# Patient Record
Sex: MALE | Race: WHITE | NOT HISPANIC OR LATINO | Employment: OTHER
[De-identification: names, ages, dates, MRNs, and addresses within clinical notes are randomized per-mention and may not be internally consistent; named-entity substitution may affect disease eponyms.]

---

## 2017-02-22 ENCOUNTER — RECORDS - HEALTHEAST (OUTPATIENT)
Dept: ADMINISTRATIVE | Facility: OTHER | Age: 62
End: 2017-02-22

## 2017-03-21 ENCOUNTER — OFFICE VISIT - HEALTHEAST (OUTPATIENT)
Dept: FAMILY MEDICINE | Facility: CLINIC | Age: 62
End: 2017-03-21

## 2017-03-21 DIAGNOSIS — R06.2 WHEEZING: ICD-10-CM

## 2017-03-21 DIAGNOSIS — J11.1 INFLUENZA-LIKE ILLNESS: ICD-10-CM

## 2017-04-04 ENCOUNTER — RECORDS - HEALTHEAST (OUTPATIENT)
Dept: ADMINISTRATIVE | Facility: OTHER | Age: 62
End: 2017-04-04

## 2017-05-02 ENCOUNTER — RECORDS - HEALTHEAST (OUTPATIENT)
Dept: ADMINISTRATIVE | Facility: OTHER | Age: 62
End: 2017-05-02

## 2017-05-04 ENCOUNTER — COMMUNICATION - HEALTHEAST (OUTPATIENT)
Dept: SCHEDULING | Facility: CLINIC | Age: 62
End: 2017-05-04

## 2017-05-07 ENCOUNTER — COMMUNICATION - HEALTHEAST (OUTPATIENT)
Dept: FAMILY MEDICINE | Facility: CLINIC | Age: 62
End: 2017-05-07

## 2017-05-09 ENCOUNTER — AMBULATORY - HEALTHEAST (OUTPATIENT)
Dept: LAB | Facility: CLINIC | Age: 62
End: 2017-05-09

## 2017-05-09 ENCOUNTER — COMMUNICATION - HEALTHEAST (OUTPATIENT)
Dept: FAMILY MEDICINE | Facility: CLINIC | Age: 62
End: 2017-05-09

## 2017-05-09 DIAGNOSIS — J18.9 CAP (COMMUNITY ACQUIRED PNEUMONIA): ICD-10-CM

## 2017-05-11 ENCOUNTER — OFFICE VISIT - HEALTHEAST (OUTPATIENT)
Dept: FAMILY MEDICINE | Facility: CLINIC | Age: 62
End: 2017-05-11

## 2017-05-11 ENCOUNTER — HOSPITAL ENCOUNTER (OUTPATIENT)
Dept: ULTRASOUND IMAGING | Facility: CLINIC | Age: 62
Discharge: HOME | End: 2017-05-11
Attending: FAMILY MEDICINE

## 2017-05-11 ENCOUNTER — COMMUNICATION - HEALTHEAST (OUTPATIENT)
Dept: FAMILY MEDICINE | Facility: CLINIC | Age: 62
End: 2017-05-11

## 2017-05-11 DIAGNOSIS — R78.81 STREPTOCOCCAL BACTEREMIA: ICD-10-CM

## 2017-05-11 DIAGNOSIS — R60.9 EDEMA: ICD-10-CM

## 2017-05-11 DIAGNOSIS — B95.5 STREPTOCOCCAL BACTEREMIA: ICD-10-CM

## 2017-05-11 DIAGNOSIS — J18.9 CAP (COMMUNITY ACQUIRED PNEUMONIA): ICD-10-CM

## 2017-05-12 ENCOUNTER — AMBULATORY - HEALTHEAST (OUTPATIENT)
Dept: FAMILY MEDICINE | Facility: CLINIC | Age: 62
End: 2017-05-12

## 2017-05-12 ENCOUNTER — COMMUNICATION - HEALTHEAST (OUTPATIENT)
Dept: FAMILY MEDICINE | Facility: CLINIC | Age: 62
End: 2017-05-12

## 2017-05-12 DIAGNOSIS — R60.9 EDEMA: ICD-10-CM

## 2017-05-15 ENCOUNTER — RECORDS - HEALTHEAST (OUTPATIENT)
Dept: ADMINISTRATIVE | Facility: OTHER | Age: 62
End: 2017-05-15

## 2017-05-19 ENCOUNTER — RECORDS - HEALTHEAST (OUTPATIENT)
Dept: ADMINISTRATIVE | Facility: OTHER | Age: 62
End: 2017-05-19

## 2017-05-26 ENCOUNTER — OFFICE VISIT - HEALTHEAST (OUTPATIENT)
Dept: FAMILY MEDICINE | Facility: CLINIC | Age: 62
End: 2017-05-26

## 2017-05-26 DIAGNOSIS — R05.9 COUGH: ICD-10-CM

## 2017-05-26 DIAGNOSIS — J02.9 SORE THROAT: ICD-10-CM

## 2017-05-30 ENCOUNTER — RECORDS - HEALTHEAST (OUTPATIENT)
Dept: ADMINISTRATIVE | Facility: OTHER | Age: 62
End: 2017-05-30

## 2017-05-31 ENCOUNTER — HOSPITAL ENCOUNTER (OUTPATIENT)
Dept: CARDIOLOGY | Facility: CLINIC | Age: 62
Discharge: HOME | End: 2017-05-31
Attending: FAMILY MEDICINE

## 2017-05-31 DIAGNOSIS — R60.9 EDEMA: ICD-10-CM

## 2017-05-31 LAB
AORTIC ROOT: 3.1 CM
BSA FOR ECHO PROCEDURE: 1.99 M2
CV BLOOD PRESSURE: NORMAL MMHG
CV ECHO HEIGHT: 74 IN
CV ECHO WEIGHT: 168 LBS
DOP CALC LVOT AREA: 4.15 CM2
DOP CALC LVOT DIAMETER: 2.3 CM
DOP CALC LVOT PEAK VEL: 90.2 CM/S
DOP CALC LVOT STROKE VOLUME: 73.9 CM3
DOP CALCLVOT PEAK VEL VTI: 17.8 CM
EJECTION FRACTION: 55 % (ref 55–75)
FRACTIONAL SHORTENING: 28.4 % (ref 28–44)
INTERVENTRICULAR SEPTUM IN END DIASTOLE: 0.96 CM (ref 0.6–1)
IVS/PW RATIO: 1
LA AREA 1: 13.8 CM2
LA AREA 2: 9.3 CM2
LEFT ATRIUM LENGTH: 3.64 CM
LEFT ATRIUM SIZE: 2.6 CM
LEFT ATRIUM TO AORTIC ROOT RATIO: 0.84 NO UNITS
LEFT ATRIUM VOLUME INDEX: 15.1 ML/M2
LEFT ATRIUM VOLUME: 30 CM3
LEFT VENTRICLE DIASTOLIC VOLUME INDEX: 45.7 CM3/M2 (ref 34–74)
LEFT VENTRICLE DIASTOLIC VOLUME: 91 CM3 (ref 62–150)
LEFT VENTRICLE MASS INDEX: 81.7 G/M2
LEFT VENTRICLE SYSTOLIC VOLUME INDEX: 20.6 CM3/M2 (ref 11–31)
LEFT VENTRICLE SYSTOLIC VOLUME: 41 CM3 (ref 21–61)
LEFT VENTRICULAR INTERNAL DIMENSION IN DIASTOLE: 4.9 CM (ref 4.2–5.8)
LEFT VENTRICULAR INTERNAL DIMENSION IN SYSTOLE: 3.51 CM (ref 2.5–4)
LEFT VENTRICULAR MASS: 162.6 G
LEFT VENTRICULAR OUTFLOW TRACT MEAN GRADIENT: 2 MMHG
LEFT VENTRICULAR OUTFLOW TRACT MEAN VELOCITY: 61.7 CM/S
LEFT VENTRICULAR OUTFLOW TRACT PEAK GRADIENT: 3 MMHG
LEFT VENTRICULAR POSTERIOR WALL IN END DIASTOLE: 0.93 CM (ref 0.6–1)
LV STROKE VOLUME INDEX: 37.1 ML/M2
MITRAL VALVE E/A RATIO: 0.9
MV DECELERATION TIME: 225 MS
MV E'TISSUE VEL-LAT: 8.68 CM/S
MV LATERAL E/E' RATIO: 6.9
MV PEAK A VELOCITY: 67.6 CM/S
MV PEAK E VELOCITY: 60.2 CM/S
NUC REST DIASTOLIC VOLUME INDEX: 2688 LBS
NUC REST SYSTOLIC VOLUME INDEX: 74 IN
TRICUSPID REGURGITATION PEAK PRESSURE GRADIENT: 12.3 MMHG
TRICUSPID VALVE ANULAR PLANE SYSTOLIC EXCURSION: 2.3 CM
TRICUSPID VALVE PEAK REGURGITANT VELOCITY: 175 CM/S

## 2017-05-31 ASSESSMENT — MIFFLIN-ST. JEOR: SCORE: 1616.79

## 2017-06-06 ENCOUNTER — COMMUNICATION - HEALTHEAST (OUTPATIENT)
Dept: FAMILY MEDICINE | Facility: CLINIC | Age: 62
End: 2017-06-06

## 2017-06-06 ENCOUNTER — AMBULATORY - HEALTHEAST (OUTPATIENT)
Dept: FAMILY MEDICINE | Facility: CLINIC | Age: 62
End: 2017-06-06

## 2017-06-06 DIAGNOSIS — J18.9 CAP (COMMUNITY ACQUIRED PNEUMONIA): ICD-10-CM

## 2017-06-23 ENCOUNTER — RECORDS - HEALTHEAST (OUTPATIENT)
Dept: GENERAL RADIOLOGY | Facility: CLINIC | Age: 62
End: 2017-06-23

## 2017-06-23 DIAGNOSIS — J18.9 PNEUMONIA, UNSPECIFIED ORGANISM: ICD-10-CM

## 2017-06-28 ENCOUNTER — RECORDS - HEALTHEAST (OUTPATIENT)
Dept: ADMINISTRATIVE | Facility: OTHER | Age: 62
End: 2017-06-28

## 2017-07-11 ENCOUNTER — RECORDS - HEALTHEAST (OUTPATIENT)
Dept: ADMINISTRATIVE | Facility: OTHER | Age: 62
End: 2017-07-11

## 2017-08-09 ENCOUNTER — RECORDS - HEALTHEAST (OUTPATIENT)
Dept: ADMINISTRATIVE | Facility: OTHER | Age: 62
End: 2017-08-09

## 2017-08-30 ENCOUNTER — RECORDS - HEALTHEAST (OUTPATIENT)
Dept: ADMINISTRATIVE | Facility: OTHER | Age: 62
End: 2017-08-30

## 2017-11-01 ENCOUNTER — RECORDS - HEALTHEAST (OUTPATIENT)
Dept: ADMINISTRATIVE | Facility: OTHER | Age: 62
End: 2017-11-01

## 2017-11-03 ENCOUNTER — OFFICE VISIT - HEALTHEAST (OUTPATIENT)
Dept: FAMILY MEDICINE | Facility: CLINIC | Age: 62
End: 2017-11-03

## 2017-11-03 ENCOUNTER — COMMUNICATION - HEALTHEAST (OUTPATIENT)
Dept: TELEHEALTH | Facility: CLINIC | Age: 62
End: 2017-11-03

## 2017-11-03 DIAGNOSIS — H91.90 HEARING LOSS: ICD-10-CM

## 2017-11-03 DIAGNOSIS — Z87.01 H/O: PNEUMONIA: ICD-10-CM

## 2017-11-03 DIAGNOSIS — H93.19 TINNITUS: ICD-10-CM

## 2017-11-28 ENCOUNTER — RECORDS - HEALTHEAST (OUTPATIENT)
Dept: ADMINISTRATIVE | Facility: OTHER | Age: 62
End: 2017-11-28

## 2018-03-26 ENCOUNTER — RECORDS - HEALTHEAST (OUTPATIENT)
Dept: ADMINISTRATIVE | Facility: OTHER | Age: 63
End: 2018-03-26

## 2018-04-24 ENCOUNTER — RECORDS - HEALTHEAST (OUTPATIENT)
Dept: ADMINISTRATIVE | Facility: OTHER | Age: 63
End: 2018-04-24

## 2018-04-26 ENCOUNTER — OFFICE VISIT - HEALTHEAST (OUTPATIENT)
Dept: FAMILY MEDICINE | Facility: CLINIC | Age: 63
End: 2018-04-26

## 2018-04-26 ENCOUNTER — COMMUNICATION - HEALTHEAST (OUTPATIENT)
Dept: TELEHEALTH | Facility: CLINIC | Age: 63
End: 2018-04-26

## 2018-04-26 ENCOUNTER — COMMUNICATION - HEALTHEAST (OUTPATIENT)
Dept: FAMILY MEDICINE | Facility: CLINIC | Age: 63
End: 2018-04-26

## 2018-04-26 DIAGNOSIS — R68.89 COLD FEELING: ICD-10-CM

## 2018-04-26 DIAGNOSIS — R05.9 COUGH: ICD-10-CM

## 2018-04-26 LAB — TSH SERPL DL<=0.005 MIU/L-ACNC: 1.62 UIU/ML (ref 0.3–5)

## 2018-04-29 ENCOUNTER — OFFICE VISIT - HEALTHEAST (OUTPATIENT)
Dept: FAMILY MEDICINE | Facility: CLINIC | Age: 63
End: 2018-04-29

## 2018-04-29 DIAGNOSIS — R05.9 COUGH: ICD-10-CM

## 2018-04-29 DIAGNOSIS — J18.9 CAP (COMMUNITY ACQUIRED PNEUMONIA): ICD-10-CM

## 2018-06-01 ENCOUNTER — RECORDS - HEALTHEAST (OUTPATIENT)
Dept: ADMINISTRATIVE | Facility: OTHER | Age: 63
End: 2018-06-01

## 2018-06-18 ENCOUNTER — RECORDS - HEALTHEAST (OUTPATIENT)
Dept: ADMINISTRATIVE | Facility: OTHER | Age: 63
End: 2018-06-18

## 2018-07-31 ENCOUNTER — RECORDS - HEALTHEAST (OUTPATIENT)
Dept: ADMINISTRATIVE | Facility: OTHER | Age: 63
End: 2018-07-31

## 2018-08-06 ENCOUNTER — RECORDS - HEALTHEAST (OUTPATIENT)
Dept: ADMINISTRATIVE | Facility: OTHER | Age: 63
End: 2018-08-06

## 2018-09-12 ENCOUNTER — RECORDS - HEALTHEAST (OUTPATIENT)
Dept: ADMINISTRATIVE | Facility: OTHER | Age: 63
End: 2018-09-12

## 2018-10-02 ENCOUNTER — RECORDS - HEALTHEAST (OUTPATIENT)
Dept: ADMINISTRATIVE | Facility: OTHER | Age: 63
End: 2018-10-02

## 2018-10-05 ENCOUNTER — COMMUNICATION - HEALTHEAST (OUTPATIENT)
Dept: FAMILY MEDICINE | Facility: CLINIC | Age: 63
End: 2018-10-05

## 2018-10-20 ENCOUNTER — RECORDS - HEALTHEAST (OUTPATIENT)
Dept: ADMINISTRATIVE | Facility: OTHER | Age: 63
End: 2018-10-20

## 2018-10-24 ENCOUNTER — RECORDS - HEALTHEAST (OUTPATIENT)
Dept: ADMINISTRATIVE | Facility: OTHER | Age: 63
End: 2018-10-24

## 2018-11-06 ENCOUNTER — OFFICE VISIT - HEALTHEAST (OUTPATIENT)
Dept: INTERNAL MEDICINE | Facility: CLINIC | Age: 63
End: 2018-11-06

## 2018-11-06 DIAGNOSIS — M19.041 PRIMARY OSTEOARTHRITIS OF BOTH HANDS: ICD-10-CM

## 2018-11-06 DIAGNOSIS — M19.042 PRIMARY OSTEOARTHRITIS OF BOTH HANDS: ICD-10-CM

## 2018-11-06 DIAGNOSIS — K50.812 CROHN'S DISEASE OF BOTH SMALL AND LARGE INTESTINE WITH INTESTINAL OBSTRUCTION (H): ICD-10-CM

## 2018-11-06 DIAGNOSIS — E53.8 VITAMIN B12 DEFICIENCY (NON ANEMIC): ICD-10-CM

## 2018-11-06 DIAGNOSIS — R73.9 HYPERGLYCEMIA: ICD-10-CM

## 2018-11-06 DIAGNOSIS — L30.9 DERMATITIS: ICD-10-CM

## 2018-11-06 DIAGNOSIS — Z12.5 SCREENING FOR PROSTATE CANCER: ICD-10-CM

## 2018-11-06 LAB
HBA1C MFR BLD: 5.1 % (ref 3.5–6)
PSA SERPL-MCNC: 0.3 NG/ML (ref 0–4.5)

## 2018-11-06 ASSESSMENT — MIFFLIN-ST. JEOR: SCORE: 1673.77

## 2018-11-07 ENCOUNTER — COMMUNICATION - HEALTHEAST (OUTPATIENT)
Dept: INTERNAL MEDICINE | Facility: CLINIC | Age: 63
End: 2018-11-07

## 2018-12-03 ENCOUNTER — RECORDS - HEALTHEAST (OUTPATIENT)
Dept: ADMINISTRATIVE | Facility: OTHER | Age: 63
End: 2018-12-03

## 2019-01-08 ENCOUNTER — RECORDS - HEALTHEAST (OUTPATIENT)
Dept: ADMINISTRATIVE | Facility: OTHER | Age: 64
End: 2019-01-08

## 2019-02-18 ENCOUNTER — RECORDS - HEALTHEAST (OUTPATIENT)
Dept: ADMINISTRATIVE | Facility: OTHER | Age: 64
End: 2019-02-18

## 2019-04-01 ENCOUNTER — RECORDS - HEALTHEAST (OUTPATIENT)
Dept: ADMINISTRATIVE | Facility: OTHER | Age: 64
End: 2019-04-01

## 2019-04-11 ENCOUNTER — OFFICE VISIT - HEALTHEAST (OUTPATIENT)
Dept: INTERNAL MEDICINE | Facility: CLINIC | Age: 64
End: 2019-04-11

## 2019-04-11 DIAGNOSIS — M25.552 HIP PAIN, BILATERAL: ICD-10-CM

## 2019-04-11 DIAGNOSIS — M54.50 ACUTE BILATERAL LOW BACK PAIN WITHOUT SCIATICA: ICD-10-CM

## 2019-04-11 DIAGNOSIS — K50.812 CROHN'S DISEASE OF BOTH SMALL AND LARGE INTESTINE WITH INTESTINAL OBSTRUCTION (H): ICD-10-CM

## 2019-04-11 DIAGNOSIS — M25.561 ACUTE PAIN OF BOTH KNEES: ICD-10-CM

## 2019-04-11 DIAGNOSIS — M25.551 HIP PAIN, BILATERAL: ICD-10-CM

## 2019-04-11 DIAGNOSIS — M25.562 ACUTE PAIN OF BOTH KNEES: ICD-10-CM

## 2019-04-30 ENCOUNTER — RECORDS - HEALTHEAST (OUTPATIENT)
Dept: ADMINISTRATIVE | Facility: OTHER | Age: 64
End: 2019-04-30

## 2019-05-13 ENCOUNTER — RECORDS - HEALTHEAST (OUTPATIENT)
Dept: ADMINISTRATIVE | Facility: OTHER | Age: 64
End: 2019-05-13

## 2019-06-24 ENCOUNTER — RECORDS - HEALTHEAST (OUTPATIENT)
Dept: ADMINISTRATIVE | Facility: OTHER | Age: 64
End: 2019-06-24

## 2019-07-10 ENCOUNTER — OFFICE VISIT - HEALTHEAST (OUTPATIENT)
Dept: INTERNAL MEDICINE | Facility: CLINIC | Age: 64
End: 2019-07-10

## 2019-07-10 DIAGNOSIS — M19.041 PRIMARY OSTEOARTHRITIS OF BOTH HANDS: ICD-10-CM

## 2019-07-10 DIAGNOSIS — Z82.49 FAMILY HISTORY OF CORONARY ARTERY DISEASE IN FATHER: ICD-10-CM

## 2019-07-10 DIAGNOSIS — M19.042 PRIMARY OSTEOARTHRITIS OF BOTH HANDS: ICD-10-CM

## 2019-07-10 DIAGNOSIS — Z00.00 HEALTHCARE MAINTENANCE: ICD-10-CM

## 2019-07-10 DIAGNOSIS — E53.8 VITAMIN B12 DEFICIENCY (NON ANEMIC): ICD-10-CM

## 2019-07-10 DIAGNOSIS — N52.9 ERECTILE DYSFUNCTION, UNSPECIFIED ERECTILE DYSFUNCTION TYPE: ICD-10-CM

## 2019-07-10 DIAGNOSIS — K50.812 CROHN'S DISEASE OF BOTH SMALL AND LARGE INTESTINE WITH INTESTINAL OBSTRUCTION (H): ICD-10-CM

## 2019-07-10 LAB
ALBUMIN SERPL-MCNC: 3.4 G/DL (ref 3.5–5)
ALP SERPL-CCNC: 72 U/L (ref 45–120)
ALT SERPL W P-5'-P-CCNC: 15 U/L (ref 0–45)
ANION GAP SERPL CALCULATED.3IONS-SCNC: 5 MMOL/L (ref 5–18)
AST SERPL W P-5'-P-CCNC: 22 U/L (ref 0–40)
BILIRUB SERPL-MCNC: 1.4 MG/DL (ref 0–1)
BUN SERPL-MCNC: 13 MG/DL (ref 8–22)
CALCIUM SERPL-MCNC: 9.5 MG/DL (ref 8.5–10.5)
CHLORIDE BLD-SCNC: 104 MMOL/L (ref 98–107)
CO2 SERPL-SCNC: 28 MMOL/L (ref 22–31)
CREAT SERPL-MCNC: 1.17 MG/DL (ref 0.7–1.3)
ERYTHROCYTE [DISTWIDTH] IN BLOOD BY AUTOMATED COUNT: 12.5 % (ref 11–14.5)
FASTING STATUS PATIENT QL REPORTED: NORMAL
GFR SERPL CREATININE-BSD FRML MDRD: >60 ML/MIN/1.73M2
GLUCOSE BLD-MCNC: 84 MG/DL (ref 70–125)
HCT VFR BLD AUTO: 40.1 % (ref 40–54)
HDLC SERPL-MCNC: 48 MG/DL
HGB BLD-MCNC: 13.2 G/DL (ref 14–18)
LDLC SERPL CALC-MCNC: 59 MG/DL
MCH RBC QN AUTO: 29.3 PG (ref 27–34)
MCHC RBC AUTO-ENTMCNC: 33 G/DL (ref 32–36)
MCV RBC AUTO: 89 FL (ref 80–100)
PLATELET # BLD AUTO: 248 THOU/UL (ref 140–440)
PMV BLD AUTO: 7.6 FL (ref 7–10)
POTASSIUM BLD-SCNC: 4.2 MMOL/L (ref 3.5–5)
PROT SERPL-MCNC: 7.1 G/DL (ref 6–8)
RBC # BLD AUTO: 4.51 MILL/UL (ref 4.4–6.2)
SODIUM SERPL-SCNC: 137 MMOL/L (ref 136–145)
VIT B12 SERPL-MCNC: 438 PG/ML (ref 213–816)
WBC: 11.2 THOU/UL (ref 4–11)

## 2019-07-10 ASSESSMENT — MIFFLIN-ST. JEOR: SCORE: 1673.78

## 2019-07-11 ENCOUNTER — COMMUNICATION - HEALTHEAST (OUTPATIENT)
Dept: INTERNAL MEDICINE | Facility: CLINIC | Age: 64
End: 2019-07-11

## 2019-07-30 ENCOUNTER — RECORDS - HEALTHEAST (OUTPATIENT)
Dept: ADMINISTRATIVE | Facility: OTHER | Age: 64
End: 2019-07-30

## 2019-08-05 ENCOUNTER — RECORDS - HEALTHEAST (OUTPATIENT)
Dept: ADMINISTRATIVE | Facility: OTHER | Age: 64
End: 2019-08-05

## 2019-09-16 ENCOUNTER — RECORDS - HEALTHEAST (OUTPATIENT)
Dept: ADMINISTRATIVE | Facility: OTHER | Age: 64
End: 2019-09-16

## 2019-09-18 ENCOUNTER — RECORDS - HEALTHEAST (OUTPATIENT)
Dept: ADMINISTRATIVE | Facility: OTHER | Age: 64
End: 2019-09-18

## 2019-10-23 ENCOUNTER — RECORDS - HEALTHEAST (OUTPATIENT)
Dept: ADMINISTRATIVE | Facility: OTHER | Age: 64
End: 2019-10-23

## 2019-12-16 ENCOUNTER — OFFICE VISIT - HEALTHEAST (OUTPATIENT)
Dept: FAMILY MEDICINE | Facility: CLINIC | Age: 64
End: 2019-12-16

## 2019-12-16 DIAGNOSIS — R42 DIZZINESS: ICD-10-CM

## 2019-12-16 DIAGNOSIS — R61 NIGHT SWEATS: ICD-10-CM

## 2019-12-16 DIAGNOSIS — R51.9 NONINTRACTABLE EPISODIC HEADACHE, UNSPECIFIED HEADACHE TYPE: ICD-10-CM

## 2019-12-16 DIAGNOSIS — I44.4 LAFB (LEFT ANTERIOR FASCICULAR BLOCK): ICD-10-CM

## 2019-12-16 LAB
ALBUMIN SERPL-MCNC: 3.6 G/DL (ref 3.5–5)
ALP SERPL-CCNC: 87 U/L (ref 45–120)
ALT SERPL W P-5'-P-CCNC: 12 U/L (ref 0–45)
ANION GAP SERPL CALCULATED.3IONS-SCNC: 12 MMOL/L (ref 5–18)
AST SERPL W P-5'-P-CCNC: 18 U/L (ref 0–40)
BILIRUB SERPL-MCNC: 1 MG/DL (ref 0–1)
BUN SERPL-MCNC: 14 MG/DL (ref 8–22)
CALCIUM SERPL-MCNC: 9.4 MG/DL (ref 8.5–10.5)
CHLORIDE BLD-SCNC: 104 MMOL/L (ref 98–107)
CO2 SERPL-SCNC: 24 MMOL/L (ref 22–31)
CREAT SERPL-MCNC: 1.24 MG/DL (ref 0.7–1.3)
ERYTHROCYTE [DISTWIDTH] IN BLOOD BY AUTOMATED COUNT: 12 % (ref 11–14.5)
GFR SERPL CREATININE-BSD FRML MDRD: 59 ML/MIN/1.73M2
GLUCOSE BLD-MCNC: 80 MG/DL (ref 70–125)
HCT VFR BLD AUTO: 46.3 % (ref 40–54)
HGB BLD-MCNC: 14.8 G/DL (ref 14–18)
MCH RBC QN AUTO: 29.3 PG (ref 27–34)
MCHC RBC AUTO-ENTMCNC: 32 G/DL (ref 32–36)
MCV RBC AUTO: 92 FL (ref 80–100)
PLATELET # BLD AUTO: 292 THOU/UL (ref 140–440)
PMV BLD AUTO: 7.6 FL (ref 7–10)
POTASSIUM BLD-SCNC: 4.2 MMOL/L (ref 3.5–5)
PROT SERPL-MCNC: 7.9 G/DL (ref 6–8)
RBC # BLD AUTO: 5.04 MILL/UL (ref 4.4–6.2)
SODIUM SERPL-SCNC: 140 MMOL/L (ref 136–145)
TSH SERPL DL<=0.005 MIU/L-ACNC: 3.5 UIU/ML (ref 0.3–5)
VIT B12 SERPL-MCNC: 620 PG/ML (ref 213–816)
WBC: 16.3 THOU/UL (ref 4–11)

## 2019-12-17 LAB
ATRIAL RATE - MUSE: 82 BPM
DIASTOLIC BLOOD PRESSURE - MUSE: NORMAL
INTERPRETATION ECG - MUSE: NORMAL
P AXIS - MUSE: 70 DEGREES
PR INTERVAL - MUSE: 178 MS
QRS DURATION - MUSE: 100 MS
QT - MUSE: 386 MS
QTC - MUSE: 450 MS
R AXIS - MUSE: -55 DEGREES
SYSTOLIC BLOOD PRESSURE - MUSE: NORMAL
T AXIS - MUSE: 55 DEGREES
VENTRICULAR RATE- MUSE: 82 BPM

## 2020-01-08 ENCOUNTER — RECORDS - HEALTHEAST (OUTPATIENT)
Dept: ADMINISTRATIVE | Facility: OTHER | Age: 65
End: 2020-01-08

## 2020-02-14 ENCOUNTER — RECORDS - HEALTHEAST (OUTPATIENT)
Dept: ADMINISTRATIVE | Facility: OTHER | Age: 65
End: 2020-02-14

## 2020-03-25 ENCOUNTER — RECORDS - HEALTHEAST (OUTPATIENT)
Dept: ADMINISTRATIVE | Facility: OTHER | Age: 65
End: 2020-03-25

## 2020-04-24 ENCOUNTER — RECORDS - HEALTHEAST (OUTPATIENT)
Dept: ADMINISTRATIVE | Facility: OTHER | Age: 65
End: 2020-04-24

## 2020-05-08 ENCOUNTER — RECORDS - HEALTHEAST (OUTPATIENT)
Dept: ADMINISTRATIVE | Facility: OTHER | Age: 65
End: 2020-05-08

## 2020-06-09 ENCOUNTER — RECORDS - HEALTHEAST (OUTPATIENT)
Dept: ADMINISTRATIVE | Facility: OTHER | Age: 65
End: 2020-06-09

## 2020-06-10 ENCOUNTER — AMBULATORY - HEALTHEAST (OUTPATIENT)
Dept: SURGERY | Facility: AMBULATORY SURGERY CENTER | Age: 65
End: 2020-06-10

## 2020-06-10 ENCOUNTER — RECORDS - HEALTHEAST (OUTPATIENT)
Dept: ADMINISTRATIVE | Facility: OTHER | Age: 65
End: 2020-06-10

## 2020-06-10 DIAGNOSIS — Z11.59 ENCOUNTER FOR SCREENING FOR OTHER VIRAL DISEASES: ICD-10-CM

## 2020-06-11 ENCOUNTER — COMMUNICATION - HEALTHEAST (OUTPATIENT)
Dept: TELEHEALTH | Facility: CLINIC | Age: 65
End: 2020-06-11

## 2020-06-11 ENCOUNTER — OFFICE VISIT - HEALTHEAST (OUTPATIENT)
Dept: INTERNAL MEDICINE | Facility: CLINIC | Age: 65
End: 2020-06-11

## 2020-06-11 DIAGNOSIS — K50.812 CROHN'S DISEASE OF BOTH SMALL AND LARGE INTESTINE WITH INTESTINAL OBSTRUCTION (H): ICD-10-CM

## 2020-06-11 DIAGNOSIS — K61.0 PERIANAL ABSCESS: ICD-10-CM

## 2020-06-11 DIAGNOSIS — Z01.818 PREOP GENERAL PHYSICAL EXAM: ICD-10-CM

## 2020-06-11 DIAGNOSIS — M85.89 OSTEOPENIA OF MULTIPLE SITES: ICD-10-CM

## 2020-06-11 LAB
ANION GAP SERPL CALCULATED.3IONS-SCNC: 11 MMOL/L (ref 5–18)
BASOPHILS # BLD AUTO: 0.1 THOU/UL (ref 0–0.2)
BASOPHILS NFR BLD AUTO: 1 % (ref 0–2)
BUN SERPL-MCNC: 8 MG/DL (ref 8–22)
CALCIUM SERPL-MCNC: 8.7 MG/DL (ref 8.5–10.5)
CHLORIDE BLD-SCNC: 104 MMOL/L (ref 98–107)
CO2 SERPL-SCNC: 24 MMOL/L (ref 22–31)
CREAT SERPL-MCNC: 1.13 MG/DL (ref 0.7–1.3)
EOSINOPHIL # BLD AUTO: 0.3 THOU/UL (ref 0–0.4)
EOSINOPHIL NFR BLD AUTO: 3 % (ref 0–6)
ERYTHROCYTE [DISTWIDTH] IN BLOOD BY AUTOMATED COUNT: 11.8 % (ref 11–14.5)
GFR SERPL CREATININE-BSD FRML MDRD: >60 ML/MIN/1.73M2
GLUCOSE BLD-MCNC: 103 MG/DL (ref 70–125)
HCT VFR BLD AUTO: 41 % (ref 40–54)
HGB BLD-MCNC: 13.5 G/DL (ref 14–18)
LYMPHOCYTES # BLD AUTO: 3.5 THOU/UL (ref 0.8–4.4)
LYMPHOCYTES NFR BLD AUTO: 31 % (ref 20–40)
MCH RBC QN AUTO: 29.7 PG (ref 27–34)
MCHC RBC AUTO-ENTMCNC: 32.9 G/DL (ref 32–36)
MCV RBC AUTO: 90 FL (ref 80–100)
MONOCYTES # BLD AUTO: 0.9 THOU/UL (ref 0–0.9)
MONOCYTES NFR BLD AUTO: 8 % (ref 2–10)
NEUTROPHILS # BLD AUTO: 6.3 THOU/UL (ref 2–7.7)
NEUTROPHILS NFR BLD AUTO: 58 % (ref 50–70)
PLATELET # BLD AUTO: 341 THOU/UL (ref 140–440)
PMV BLD AUTO: 7.4 FL (ref 7–10)
POTASSIUM BLD-SCNC: 3.9 MMOL/L (ref 3.5–5)
RBC # BLD AUTO: 4.54 MILL/UL (ref 4.4–6.2)
SODIUM SERPL-SCNC: 139 MMOL/L (ref 136–145)
WBC: 11 THOU/UL (ref 4–11)

## 2020-06-11 ASSESSMENT — MIFFLIN-ST. JEOR
SCORE: 1712.05
SCORE: 1712.05

## 2020-06-12 ENCOUNTER — ANESTHESIA - HEALTHEAST (OUTPATIENT)
Dept: SURGERY | Facility: AMBULATORY SURGERY CENTER | Age: 65
End: 2020-06-12

## 2020-06-12 ENCOUNTER — AMBULATORY - HEALTHEAST (OUTPATIENT)
Dept: FAMILY MEDICINE | Facility: CLINIC | Age: 65
End: 2020-06-12

## 2020-06-12 DIAGNOSIS — Z11.59 ENCOUNTER FOR SCREENING FOR OTHER VIRAL DISEASES: ICD-10-CM

## 2020-06-12 LAB
ATRIAL RATE - MUSE: 76 BPM
DIASTOLIC BLOOD PRESSURE - MUSE: NORMAL
INTERPRETATION ECG - MUSE: NORMAL
P AXIS - MUSE: 71 DEGREES
PR INTERVAL - MUSE: 182 MS
QRS DURATION - MUSE: 100 MS
QT - MUSE: 404 MS
QTC - MUSE: 454 MS
R AXIS - MUSE: -50 DEGREES
SYSTOLIC BLOOD PRESSURE - MUSE: NORMAL
T AXIS - MUSE: 53 DEGREES
VENTRICULAR RATE- MUSE: 76 BPM

## 2020-06-15 ENCOUNTER — SURGERY - HEALTHEAST (OUTPATIENT)
Dept: SURGERY | Facility: AMBULATORY SURGERY CENTER | Age: 65
End: 2020-06-15

## 2020-06-15 ENCOUNTER — RECORDS - HEALTHEAST (OUTPATIENT)
Dept: LAB | Facility: HOSPITAL | Age: 65
End: 2020-06-15

## 2020-06-15 ENCOUNTER — HOSPITAL ENCOUNTER (OUTPATIENT)
Dept: SURGERY | Facility: AMBULATORY SURGERY CENTER | Age: 65
Discharge: HOME | End: 2020-06-15
Attending: COLON & RECTAL SURGERY | Admitting: COLON & RECTAL SURGERY

## 2020-06-15 LAB
HBV SURFACE AG SERPL QL IA: NEGATIVE
HIV 1+2 AB+HIV1 P24 AG SERPL QL IA: NEGATIVE
HIV 1,2 ANTIBODY: NEGATIVE

## 2020-06-15 ASSESSMENT — MIFFLIN-ST. JEOR
SCORE: 1712.05
SCORE: 1712.05

## 2020-06-16 LAB — HCV AB SERPL QL IA: NEGATIVE

## 2020-06-18 ENCOUNTER — COMMUNICATION - HEALTHEAST (OUTPATIENT)
Dept: SCHEDULING | Facility: CLINIC | Age: 65
End: 2020-06-18

## 2020-06-18 ENCOUNTER — COMMUNICATION - HEALTHEAST (OUTPATIENT)
Dept: INTERNAL MEDICINE | Facility: CLINIC | Age: 65
End: 2020-06-18

## 2020-06-18 ENCOUNTER — OFFICE VISIT - HEALTHEAST (OUTPATIENT)
Dept: INTERNAL MEDICINE | Facility: CLINIC | Age: 65
End: 2020-06-18

## 2020-06-18 DIAGNOSIS — R60.0 BILATERAL LEG EDEMA: ICD-10-CM

## 2020-06-18 DIAGNOSIS — M85.89 OSTEOPENIA OF MULTIPLE SITES: ICD-10-CM

## 2020-06-18 DIAGNOSIS — K50.812 CROHN'S DISEASE OF BOTH SMALL AND LARGE INTESTINE WITH INTESTINAL OBSTRUCTION (H): ICD-10-CM

## 2020-06-18 DIAGNOSIS — L02.31 ABSCESS OF BUTTOCK, RIGHT: ICD-10-CM

## 2020-06-18 LAB
ALBUMIN SERPL-MCNC: 3.2 G/DL (ref 3.5–5)
ALBUMIN UR-MCNC: NEGATIVE MG/DL
ALP SERPL-CCNC: 73 U/L (ref 45–120)
ALT SERPL W P-5'-P-CCNC: 44 U/L (ref 0–45)
ANION GAP SERPL CALCULATED.3IONS-SCNC: 9 MMOL/L (ref 5–18)
APPEARANCE UR: CLEAR
AST SERPL W P-5'-P-CCNC: 46 U/L (ref 0–40)
BASOPHILS # BLD AUTO: 0.1 THOU/UL (ref 0–0.2)
BASOPHILS NFR BLD AUTO: 1 % (ref 0–2)
BILIRUB SERPL-MCNC: 1.1 MG/DL (ref 0–1)
BILIRUB UR QL STRIP: NEGATIVE
BUN SERPL-MCNC: 12 MG/DL (ref 8–22)
CALCIUM SERPL-MCNC: 8.6 MG/DL (ref 8.5–10.5)
CHLORIDE BLD-SCNC: 105 MMOL/L (ref 98–107)
CO2 SERPL-SCNC: 26 MMOL/L (ref 22–31)
COLOR UR AUTO: YELLOW
CREAT SERPL-MCNC: 1.15 MG/DL (ref 0.7–1.3)
EOSINOPHIL # BLD AUTO: 0.4 THOU/UL (ref 0–0.4)
EOSINOPHIL NFR BLD AUTO: 3 % (ref 0–6)
ERYTHROCYTE [DISTWIDTH] IN BLOOD BY AUTOMATED COUNT: 12.7 % (ref 11–14.5)
GFR SERPL CREATININE-BSD FRML MDRD: >60 ML/MIN/1.73M2
GLUCOSE BLD-MCNC: 109 MG/DL (ref 70–125)
GLUCOSE UR STRIP-MCNC: NEGATIVE MG/DL
HCT VFR BLD AUTO: 39.6 % (ref 40–54)
HGB BLD-MCNC: 13.1 G/DL (ref 14–18)
HGB UR QL STRIP: NEGATIVE
KETONES UR STRIP-MCNC: NEGATIVE MG/DL
LEUKOCYTE ESTERASE UR QL STRIP: NEGATIVE
LYMPHOCYTES # BLD AUTO: 3 THOU/UL (ref 0.8–4.4)
LYMPHOCYTES NFR BLD AUTO: 26 % (ref 20–40)
MCH RBC QN AUTO: 30 PG (ref 27–34)
MCHC RBC AUTO-ENTMCNC: 33.2 G/DL (ref 32–36)
MCV RBC AUTO: 91 FL (ref 80–100)
MONOCYTES # BLD AUTO: 0.9 THOU/UL (ref 0–0.9)
MONOCYTES NFR BLD AUTO: 8 % (ref 2–10)
NEUTROPHILS # BLD AUTO: 7.2 THOU/UL (ref 2–7.7)
NEUTROPHILS NFR BLD AUTO: 62 % (ref 50–70)
NITRATE UR QL: NEGATIVE
PH UR STRIP: 6 [PH] (ref 5–8)
PLATELET # BLD AUTO: 304 THOU/UL (ref 140–440)
PMV BLD AUTO: 7.7 FL (ref 7–10)
POTASSIUM BLD-SCNC: 3.8 MMOL/L (ref 3.5–5)
PROT SERPL-MCNC: 7 G/DL (ref 6–8)
RBC # BLD AUTO: 4.37 MILL/UL (ref 4.4–6.2)
SODIUM SERPL-SCNC: 140 MMOL/L (ref 136–145)
SP GR UR STRIP: 1.01 (ref 1–1.03)
UROBILINOGEN UR STRIP-ACNC: NORMAL
WBC: 11.5 THOU/UL (ref 4–11)

## 2020-06-19 ENCOUNTER — RECORDS - HEALTHEAST (OUTPATIENT)
Dept: ADMINISTRATIVE | Facility: OTHER | Age: 65
End: 2020-06-19

## 2020-06-23 ENCOUNTER — COMMUNICATION - HEALTHEAST (OUTPATIENT)
Dept: INTERNAL MEDICINE | Facility: CLINIC | Age: 65
End: 2020-06-23

## 2020-06-23 ENCOUNTER — HOSPITAL ENCOUNTER (OUTPATIENT)
Dept: CARDIOLOGY | Facility: HOSPITAL | Age: 65
Discharge: HOME | End: 2020-06-23
Attending: INTERNAL MEDICINE

## 2020-06-23 DIAGNOSIS — R60.0 BILATERAL LEG EDEMA: ICD-10-CM

## 2020-06-23 DIAGNOSIS — K50.812 CROHN'S DISEASE OF BOTH SMALL AND LARGE INTESTINE WITH INTESTINAL OBSTRUCTION (H): ICD-10-CM

## 2020-06-23 LAB
AORTIC ROOT: 3.4 CM
BSA FOR ECHO PROCEDURE: 2.11 M2
CV BLOOD PRESSURE: NORMAL MMHG
CV ECHO HEIGHT: 74 IN
CV ECHO WEIGHT: 189 LBS
DOP CALC LVOT AREA: 3.8 CM2
DOP CALC LVOT DIAMETER: 2.2 CM
DOP CALC LVOT PEAK VEL: 89.4 CM/S
DOP CALC LVOT STROKE VOLUME: 66.5 CM3
DOP CALCLVOT PEAK VEL VTI: 17.5 CM
EJECTION FRACTION: 59 % (ref 55–75)
FRACTIONAL SHORTENING: 28.7 % (ref 28–44)
INTERVENTRICULAR SEPTUM IN END DIASTOLE: 0.87 CM (ref 0.6–1)
IVS/PW RATIO: 0.9
LA AREA 1: 13 CM2
LA AREA 2: 17 CM2
LEFT ATRIUM LENGTH: 4.2 CM
LEFT ATRIUM SIZE: 3 CM
LEFT ATRIUM TO AORTIC ROOT RATIO: 0.88 NO UNITS
LEFT ATRIUM VOLUME INDEX: 21.2 ML/M2
LEFT ATRIUM VOLUME: 44.7 ML
LEFT VENTRICLE CARDIAC INDEX: 2.7 L/MIN/M2
LEFT VENTRICLE CARDIAC OUTPUT: 5.7 L/MIN
LEFT VENTRICLE DIASTOLIC VOLUME INDEX: 54.5 CM3/M2 (ref 34–74)
LEFT VENTRICLE DIASTOLIC VOLUME: 115 CM3 (ref 62–150)
LEFT VENTRICLE HEART RATE: 86 BPM
LEFT VENTRICLE MASS INDEX: 73.8 G/M2
LEFT VENTRICLE SYSTOLIC VOLUME INDEX: 22.2 CM3/M2 (ref 11–31)
LEFT VENTRICLE SYSTOLIC VOLUME: 46.9 CM3 (ref 21–61)
LEFT VENTRICULAR INTERNAL DIMENSION IN DIASTOLE: 4.98 CM (ref 4.2–5.8)
LEFT VENTRICULAR INTERNAL DIMENSION IN SYSTOLE: 3.55 CM (ref 2.5–4)
LEFT VENTRICULAR MASS: 155.8 G
LEFT VENTRICULAR OUTFLOW TRACT MEAN GRADIENT: 2 MMHG
LEFT VENTRICULAR OUTFLOW TRACT MEAN VELOCITY: 59.9 CM/S
LEFT VENTRICULAR OUTFLOW TRACT PEAK GRADIENT: 3 MMHG
LEFT VENTRICULAR POSTERIOR WALL IN END DIASTOLE: 0.92 CM (ref 0.6–1)
LV STROKE VOLUME INDEX: 31.5 ML/M2
MITRAL VALVE E/A RATIO: 0.6
MV AVERAGE E/E' RATIO: 6.1 CM/S
MV DECELERATION TIME: 215 MS
MV E'TISSUE VEL-LAT: 7.83 CM/S
MV E'TISSUE VEL-MED: 8.32 CM/S
MV LATERAL E/E' RATIO: 6.3
MV MEDIAL E/E' RATIO: 5.9
MV PEAK A VELOCITY: 89.8 CM/S
MV PEAK E VELOCITY: 49.5 CM/S
NUC REST DIASTOLIC VOLUME INDEX: 3024 LBS
NUC REST SYSTOLIC VOLUME INDEX: 74 IN
TRICUSPID VALVE ANULAR PLANE SYSTOLIC EXCURSION: 2 CM

## 2020-06-23 ASSESSMENT — MIFFLIN-ST. JEOR: SCORE: 1712.05

## 2020-07-07 ENCOUNTER — RECORDS - HEALTHEAST (OUTPATIENT)
Dept: ADMINISTRATIVE | Facility: OTHER | Age: 65
End: 2020-07-07

## 2020-07-14 ENCOUNTER — RECORDS - HEALTHEAST (OUTPATIENT)
Dept: ADMINISTRATIVE | Facility: OTHER | Age: 65
End: 2020-07-14

## 2020-07-16 ENCOUNTER — RECORDS - HEALTHEAST (OUTPATIENT)
Dept: ADMINISTRATIVE | Facility: OTHER | Age: 65
End: 2020-07-16

## 2020-07-17 ENCOUNTER — AMBULATORY - HEALTHEAST (OUTPATIENT)
Dept: CT IMAGING | Facility: HOSPITAL | Age: 65
End: 2020-07-17

## 2020-07-17 DIAGNOSIS — Z11.59 ENCOUNTER FOR SCREENING FOR OTHER VIRAL DISEASES: ICD-10-CM

## 2020-07-24 ENCOUNTER — AMBULATORY - HEALTHEAST (OUTPATIENT)
Dept: FAMILY MEDICINE | Facility: CLINIC | Age: 65
End: 2020-07-24

## 2020-07-24 ENCOUNTER — RECORDS - HEALTHEAST (OUTPATIENT)
Dept: RADIOLOGY | Facility: CLINIC | Age: 65
End: 2020-07-24

## 2020-07-24 DIAGNOSIS — Z11.59 ENCOUNTER FOR SCREENING FOR OTHER VIRAL DISEASES: ICD-10-CM

## 2020-07-28 ENCOUNTER — HOSPITAL ENCOUNTER (OUTPATIENT)
Dept: CT IMAGING | Facility: HOSPITAL | Age: 65
Discharge: HOME | End: 2020-07-28
Admitting: RADIOLOGY

## 2020-07-28 DIAGNOSIS — K50.814 CROHN'S DISEASE OF BOTH SMALL AND LARGE INTESTINE WITH ABSCESS (H): ICD-10-CM

## 2020-07-28 DIAGNOSIS — L02.31 ABSCESS OF BUTTOCK, RIGHT: ICD-10-CM

## 2020-07-28 LAB
HGB BLD-MCNC: 14.5 G/DL (ref 14–18)
INR PPP: 1.03 (ref 0.9–1.1)
PLATELET # BLD AUTO: 277 THOU/UL (ref 140–440)

## 2020-07-30 ENCOUNTER — COMMUNICATION - HEALTHEAST (OUTPATIENT)
Dept: FAMILY MEDICINE | Facility: CLINIC | Age: 65
End: 2020-07-30

## 2020-07-30 ENCOUNTER — AMBULATORY - HEALTHEAST (OUTPATIENT)
Dept: INTERVENTIONAL RADIOLOGY/VASCULAR | Facility: HOSPITAL | Age: 65
End: 2020-07-30

## 2020-07-30 DIAGNOSIS — Z11.59 ENCOUNTER FOR SCREENING FOR OTHER VIRAL DISEASES: ICD-10-CM

## 2020-07-31 LAB
BACTERIA SPEC CULT: NO GROWTH
GRAM STAIN RESULT: NORMAL
GRAM STAIN RESULT: NORMAL

## 2020-08-03 ENCOUNTER — RECORDS - HEALTHEAST (OUTPATIENT)
Dept: ADMINISTRATIVE | Facility: OTHER | Age: 65
End: 2020-08-03

## 2020-08-03 ENCOUNTER — AMBULATORY - HEALTHEAST (OUTPATIENT)
Dept: FAMILY MEDICINE | Facility: CLINIC | Age: 65
End: 2020-08-03

## 2020-08-03 DIAGNOSIS — Z11.59 ENCOUNTER FOR SCREENING FOR OTHER VIRAL DISEASES: ICD-10-CM

## 2020-08-03 LAB
ALT SERPL W/O P-5'-P-CCNC: 25 U/L (ref 0–78)
AST SERPL-CCNC: 22 U/L (ref 0–37)
CREAT SERPL-MCNC: 1.21 MG/DL (ref 0.7–1.3)
GFR ESTIMATE EXT - HISTORICAL: >60 ML/MIN/1.73M2
GFR ESTIMATE, IF BLACK EXT - HISTORICAL: >60 ML/MIN/1.73M2

## 2020-08-04 ENCOUNTER — HOSPITAL ENCOUNTER (OUTPATIENT)
Dept: INTERVENTIONAL RADIOLOGY/VASCULAR | Facility: HOSPITAL | Age: 65
Discharge: HOME | End: 2020-08-04
Attending: NURSE PRACTITIONER

## 2020-08-04 ENCOUNTER — HOSPITAL ENCOUNTER (OUTPATIENT)
Dept: CT IMAGING | Facility: HOSPITAL | Age: 65
Discharge: HOME | End: 2020-08-04
Attending: NURSE PRACTITIONER

## 2020-08-04 DIAGNOSIS — L02.31 ABSCESS OF BUTTOCK, RIGHT: ICD-10-CM

## 2020-08-04 DIAGNOSIS — L02.91 ABSCESS: ICD-10-CM

## 2020-08-04 LAB
CREAT BLD-MCNC: 1.1 MG/DL (ref 0.7–1.3)
GFR SERPL CREATININE-BSD FRML MDRD: >60 ML/MIN/1.73M2

## 2020-08-04 ASSESSMENT — MIFFLIN-ST. JEOR: SCORE: 1716.58

## 2020-08-05 ENCOUNTER — RECORDS - HEALTHEAST (OUTPATIENT)
Dept: HEALTH INFORMATION MANAGEMENT | Facility: CLINIC | Age: 65
End: 2020-08-05

## 2020-08-05 ENCOUNTER — AMBULATORY - HEALTHEAST (OUTPATIENT)
Dept: INTERVENTIONAL RADIOLOGY/VASCULAR | Facility: HOSPITAL | Age: 65
End: 2020-08-05

## 2020-08-05 DIAGNOSIS — Z11.59 ENCOUNTER FOR SCREENING FOR OTHER VIRAL DISEASES: ICD-10-CM

## 2020-08-09 ENCOUNTER — AMBULATORY - HEALTHEAST (OUTPATIENT)
Dept: FAMILY MEDICINE | Facility: CLINIC | Age: 65
End: 2020-08-09

## 2020-08-09 ENCOUNTER — COMMUNICATION - HEALTHEAST (OUTPATIENT)
Dept: INTERNAL MEDICINE | Facility: CLINIC | Age: 65
End: 2020-08-09

## 2020-08-09 DIAGNOSIS — Z11.59 ENCOUNTER FOR SCREENING FOR OTHER VIRAL DISEASES: ICD-10-CM

## 2020-08-11 ENCOUNTER — COMMUNICATION - HEALTHEAST (OUTPATIENT)
Dept: SCHEDULING | Facility: CLINIC | Age: 65
End: 2020-08-11

## 2020-08-12 ENCOUNTER — HOSPITAL ENCOUNTER (OUTPATIENT)
Dept: CT IMAGING | Facility: HOSPITAL | Age: 65
Discharge: HOME | End: 2020-08-12

## 2020-08-12 ENCOUNTER — HOSPITAL ENCOUNTER (OUTPATIENT)
Dept: INTERVENTIONAL RADIOLOGY/VASCULAR | Facility: HOSPITAL | Age: 65
Discharge: HOME | End: 2020-08-12

## 2020-08-12 DIAGNOSIS — L02.31 ABSCESS OF BUTTOCK, RIGHT: ICD-10-CM

## 2020-08-18 ENCOUNTER — COMMUNICATION - HEALTHEAST (OUTPATIENT)
Dept: INTERNAL MEDICINE | Facility: CLINIC | Age: 65
End: 2020-08-18

## 2020-08-18 ENCOUNTER — OFFICE VISIT - HEALTHEAST (OUTPATIENT)
Dept: INTERNAL MEDICINE | Facility: CLINIC | Age: 65
End: 2020-08-18

## 2020-08-18 ENCOUNTER — AMBULATORY - HEALTHEAST (OUTPATIENT)
Dept: INTERVENTIONAL RADIOLOGY/VASCULAR | Facility: HOSPITAL | Age: 65
End: 2020-08-18

## 2020-08-18 DIAGNOSIS — R91.1 INCIDENTAL LUNG NODULE, > 3MM AND < 8MM: ICD-10-CM

## 2020-08-18 DIAGNOSIS — Z23 NEED FOR TDAP VACCINATION: ICD-10-CM

## 2020-08-18 DIAGNOSIS — K50.913 PERIANAL FISTULA DUE TO CROHN'S DISEASE (H): ICD-10-CM

## 2020-08-18 DIAGNOSIS — K60.30 PERIANAL FISTULA DUE TO CROHN'S DISEASE (H): ICD-10-CM

## 2020-08-18 DIAGNOSIS — Z11.59 ENCOUNTER FOR SCREENING FOR OTHER VIRAL DISEASES: ICD-10-CM

## 2020-08-20 ENCOUNTER — AMBULATORY - HEALTHEAST (OUTPATIENT)
Dept: NURSING | Facility: CLINIC | Age: 65
End: 2020-08-20

## 2020-08-20 DIAGNOSIS — Z23 NEED FOR TDAP VACCINATION: ICD-10-CM

## 2020-08-23 ENCOUNTER — AMBULATORY - HEALTHEAST (OUTPATIENT)
Dept: FAMILY MEDICINE | Facility: CLINIC | Age: 65
End: 2020-08-23

## 2020-08-23 DIAGNOSIS — Z11.59 ENCOUNTER FOR SCREENING FOR OTHER VIRAL DISEASES: ICD-10-CM

## 2020-08-26 ENCOUNTER — HOSPITAL ENCOUNTER (OUTPATIENT)
Dept: INTERVENTIONAL RADIOLOGY/VASCULAR | Facility: HOSPITAL | Age: 65
Discharge: HOME | End: 2020-08-26

## 2020-08-26 ENCOUNTER — HOSPITAL ENCOUNTER (OUTPATIENT)
Dept: CT IMAGING | Facility: HOSPITAL | Age: 65
Discharge: HOME | End: 2020-08-26

## 2020-08-26 DIAGNOSIS — L02.91 ABSCESS: ICD-10-CM

## 2020-08-28 ENCOUNTER — AMBULATORY - HEALTHEAST (OUTPATIENT)
Dept: INTERVENTIONAL RADIOLOGY/VASCULAR | Facility: HOSPITAL | Age: 65
End: 2020-08-28

## 2020-08-28 DIAGNOSIS — Z11.59 ENCOUNTER FOR SCREENING FOR OTHER VIRAL DISEASES: ICD-10-CM

## 2020-09-05 ENCOUNTER — AMBULATORY - HEALTHEAST (OUTPATIENT)
Dept: FAMILY MEDICINE | Facility: CLINIC | Age: 65
End: 2020-09-05

## 2020-09-05 DIAGNOSIS — Z11.59 ENCOUNTER FOR SCREENING FOR OTHER VIRAL DISEASES: ICD-10-CM

## 2020-09-07 ENCOUNTER — COMMUNICATION - HEALTHEAST (OUTPATIENT)
Dept: SCHEDULING | Facility: CLINIC | Age: 65
End: 2020-09-07

## 2020-09-08 ENCOUNTER — HOSPITAL ENCOUNTER (OUTPATIENT)
Dept: INTERVENTIONAL RADIOLOGY/VASCULAR | Facility: HOSPITAL | Age: 65
Discharge: HOME | End: 2020-09-08
Attending: NURSE PRACTITIONER | Admitting: RADIOLOGY

## 2020-09-08 ENCOUNTER — RECORDS - HEALTHEAST (OUTPATIENT)
Dept: INTERVENTIONAL RADIOLOGY/VASCULAR | Facility: HOSPITAL | Age: 65
End: 2020-09-08

## 2020-09-08 DIAGNOSIS — L98.8 FISTULA: ICD-10-CM

## 2020-09-08 DIAGNOSIS — L02.91 ABSCESS: ICD-10-CM

## 2020-09-08 ASSESSMENT — MIFFLIN-ST. JEOR: SCORE: 1716.58

## 2020-09-16 ENCOUNTER — AMBULATORY - HEALTHEAST (OUTPATIENT)
Dept: INTERVENTIONAL RADIOLOGY/VASCULAR | Facility: HOSPITAL | Age: 65
End: 2020-09-16

## 2020-09-16 DIAGNOSIS — Z11.59 ENCOUNTER FOR SCREENING FOR OTHER VIRAL DISEASES: ICD-10-CM

## 2020-09-18 ENCOUNTER — RECORDS - HEALTHEAST (OUTPATIENT)
Dept: INTERVENTIONAL RADIOLOGY/VASCULAR | Facility: HOSPITAL | Age: 65
End: 2020-09-18

## 2020-09-18 ENCOUNTER — RECORDS - HEALTHEAST (OUTPATIENT)
Dept: ADMINISTRATIVE | Facility: OTHER | Age: 65
End: 2020-09-18

## 2020-09-18 ENCOUNTER — HOSPITAL ENCOUNTER (OUTPATIENT)
Dept: INTERVENTIONAL RADIOLOGY/VASCULAR | Facility: HOSPITAL | Age: 65
Discharge: HOME | End: 2020-09-18

## 2020-09-18 DIAGNOSIS — K50.90 CROHN'S DISEASE (H): ICD-10-CM

## 2020-09-21 ENCOUNTER — HOSPITAL ENCOUNTER (OUTPATIENT)
Dept: CT IMAGING | Facility: CLINIC | Age: 65
Discharge: HOME | End: 2020-09-21
Attending: RADIOLOGY

## 2020-09-21 DIAGNOSIS — K50.90 CROHN'S DISEASE (H): ICD-10-CM

## 2020-09-21 LAB
CREAT BLD-MCNC: 1.3 MG/DL (ref 0.7–1.3)
GFR SERPL CREATININE-BSD FRML MDRD: 56 ML/MIN/1.73M2

## 2020-09-22 ENCOUNTER — RECORDS - HEALTHEAST (OUTPATIENT)
Dept: ADMINISTRATIVE | Facility: OTHER | Age: 65
End: 2020-09-22

## 2020-09-25 ENCOUNTER — RECORDS - HEALTHEAST (OUTPATIENT)
Dept: ADMINISTRATIVE | Facility: OTHER | Age: 65
End: 2020-09-25

## 2020-10-21 ENCOUNTER — RECORDS - HEALTHEAST (OUTPATIENT)
Dept: ADMINISTRATIVE | Facility: OTHER | Age: 65
End: 2020-10-21

## 2020-11-02 ENCOUNTER — RECORDS - HEALTHEAST (OUTPATIENT)
Dept: BONE DENSITY | Facility: CLINIC | Age: 65
End: 2020-11-02

## 2020-11-02 ENCOUNTER — RECORDS - HEALTHEAST (OUTPATIENT)
Dept: ADMINISTRATIVE | Facility: OTHER | Age: 65
End: 2020-11-02

## 2020-11-02 DIAGNOSIS — M85.89 OTHER SPECIFIED DISORDERS OF BONE DENSITY AND STRUCTURE, MULTIPLE SITES: ICD-10-CM

## 2020-11-04 ENCOUNTER — COMMUNICATION - HEALTHEAST (OUTPATIENT)
Dept: INTERNAL MEDICINE | Facility: CLINIC | Age: 65
End: 2020-11-04

## 2020-11-06 ENCOUNTER — HOSPITAL ENCOUNTER (OUTPATIENT)
Dept: CT IMAGING | Facility: CLINIC | Age: 65
Discharge: HOME | End: 2020-11-06
Attending: COLON & RECTAL SURGERY

## 2020-11-06 DIAGNOSIS — K50.814 CROHN'S DISEASE OF BOTH SMALL AND LARGE INTESTINE WITH ABSCESS (H): ICD-10-CM

## 2020-12-01 ENCOUNTER — RECORDS - HEALTHEAST (OUTPATIENT)
Dept: ADMINISTRATIVE | Facility: OTHER | Age: 65
End: 2020-12-01

## 2020-12-02 LAB
ALT SERPL W/O P-5'-P-CCNC: 31 U/L (ref 0–78)
AST SERPL-CCNC: 23 U/L (ref 0–37)

## 2020-12-04 ENCOUNTER — RECORDS - HEALTHEAST (OUTPATIENT)
Dept: HEALTH INFORMATION MANAGEMENT | Facility: CLINIC | Age: 65
End: 2020-12-04

## 2020-12-22 ENCOUNTER — RECORDS - HEALTHEAST (OUTPATIENT)
Dept: ADMINISTRATIVE | Facility: OTHER | Age: 65
End: 2020-12-22

## 2021-03-19 ENCOUNTER — RECORDS - HEALTHEAST (OUTPATIENT)
Dept: ADMINISTRATIVE | Facility: OTHER | Age: 66
End: 2021-03-19

## 2021-04-07 ENCOUNTER — RECORDS - HEALTHEAST (OUTPATIENT)
Dept: ADMINISTRATIVE | Facility: OTHER | Age: 66
End: 2021-04-07

## 2021-05-17 ENCOUNTER — RECORDS - HEALTHEAST (OUTPATIENT)
Dept: ADMINISTRATIVE | Facility: OTHER | Age: 66
End: 2021-05-17

## 2021-05-19 ENCOUNTER — RECORDS - HEALTHEAST (OUTPATIENT)
Dept: ADMINISTRATIVE | Facility: OTHER | Age: 66
End: 2021-05-19

## 2021-05-25 ENCOUNTER — RECORDS - HEALTHEAST (OUTPATIENT)
Dept: ADMINISTRATIVE | Facility: CLINIC | Age: 66
End: 2021-05-25

## 2021-05-26 ENCOUNTER — RECORDS - HEALTHEAST (OUTPATIENT)
Dept: ADMINISTRATIVE | Facility: CLINIC | Age: 66
End: 2021-05-26

## 2021-05-27 ENCOUNTER — RECORDS - HEALTHEAST (OUTPATIENT)
Dept: ADMINISTRATIVE | Facility: CLINIC | Age: 66
End: 2021-05-27

## 2021-05-27 NOTE — PROGRESS NOTES
[facility] clinic Follow Up Note    Elvin Castro   63 y.o. male    Date of Visit: 4/11/2019    Chief Complaint   Patient presents with     Knee Pain     Bilateral pain x 2 weeks.     Hip Pain     Same as above.     Subjective  Elvin is here to discuss a new finding of bilateral hip knee and lower back pain for the past 2 weeks.    Patient was in [facility] and [place] in late February and early March.  He did not have any change in his chronic postnasal drip type cough.  He did not get a respiratory infection.  No dust storms.  No tick bites.  No rashes.  He was not having arthritis problems there.    Just after getting back from [place], 3 weeks ago, he did a big tiling the floor project.  He had some moderate achiness with that but did improve over the next few days.  He denies significant inactivity after the tiling.  He feels he was getting mostly better, until 2 weeks ago when he began having increasing lower back, bilateral hip and knee pain.    There is no swollen or red knees.  No fever no rash.    He did not have a change in cough and no fever or headache.  No GI symptoms.    He is on Remicade for his Crohn's disease which is quiescent.  No flare of his Crohn's.  He has a ostomy.    He was having some moderate hand arthritis inflammation last year.  He has underlying Gregory arthritis of the hands as he works as , there was some suspicion of Crohn's inflammatory arthritis.  He has not seen a rheumatologist.    He avoids NSAIDs because of his Crohn's.    He continues to walk, sometimes for 4-5 miles at a time despite the achiness.    Pain is mainly centered on the quadriceps just above the knee, bilateral greater trochanteric bursa tenderness and gluteal muscle tenderness.  Some mild bilateral lower parasacral tightness.  No radicular type pain.  He also has bilateral gastrocnemius insertion tenderness in the popliteal area.  Some mild Achilles tendinitis.    No lower extremity  edema.  No fall or trauma.    Tylenol does improve symptoms some.  Heat helps.  He took some ibuprofen earlier today and it helped quite a bit essentially resolving his symptoms, but some achiness coming back now.    He did do extensive shoveling this morning.    His wife has similar lower back and hip and knee achiness over the past couple weeks as well.  She does not have any other symptoms and no fever.        PMHx:    Past Medical History:   Diagnosis Date     Bowel obstruction (H)      Crohn's colitis (H)      Primary osteoarthritis of both hands 11/6/2018     PSHx:    Past Surgical History:   Procedure Laterality Date     ABDOMINAL SURGERY       COLON SURGERY       ID PART REMOVAL COLON W COLOSTOMY      Description: Partial Colectomy With Colostomy;  Recorded: 05/10/2008;     Immunizations:   Immunization History   Administered Date(s) Administered     DT (pediatric) 01/01/1990, 07/05/2000     Hep A, historic 06/03/2008, 12/30/2008     Hep B, historic 06/03/2008, 06/30/2008, 12/30/2008     Influenza A3o9-09, 12/12/2009     Influenza, Seasonal, Inj PF IIV3 10/04/2011     Influenza, inj, historic,unspecified 10/15/2017     Pneumo Polysac 23-V 06/03/2008, 07/05/2013     Td,adult,historic,unspecified 06/03/2008     Tdap 06/03/2008       ROS A comprehensive review of systems was performed and was otherwise negative    Medications, allergies, and problem list were reviewed and updated    Exam  /72 (Patient Site: Right Arm, Patient Position: Sitting, Cuff Size: Adult Regular)   Pulse 88   Resp 16   Wt 183 lb 3.2 oz (83.1 kg)   BMI 23.84 kg/m    He does not appear acutely ill.  No conjunctivitis.  No scleral icterus.  No pharyngitis.  Teeth in good condition.  No cervical or supra clavicular adenopathy.  No JVD.  Lungs are clear to auscultation with good respiratory excursion, no crackles or wheezing.  No rhonchi.  Heart is regular with no murmur rub or gallop.  Abdomen soft nontender, ostomy appears healthy  just mild chronic dermatitis around the ostomy.  No lower extremity edema.  Some mild tenderness on the Achilles.  Mild tenderness mostly above the knee on the distal quadriceps muscle area.  The knee has full range of motion and no erythema or effusion of the knee.  No cracking.  No tenderness to palpation.  Full range of motion of the hips bilaterally.  No tenderness with movement of the hips.  No tenderness at the palpation of the greater trochanteric bursa.  Some mild tightness of the gluteal muscles and lower back.  Mild loss of lordosis in the lower back.  No rash or vesicular eruption.  No bruising.    Assessment/Plan  1. Hip pain, bilateral  I suspect bilateral gluteal muscle tightness with possibly some gluteal tendinitis or bursitis.    I suspect this was from overstrained with his tiling, and continue aggressive walking.    Is not been doing specific stretching for his gluteal muscles.  No evidence for acute inflammatory arthritis or infectious arthritis at this time.  Normal hip exam, except for the mild tenderness to palpation.    2. Acute pain of both knees  I suspect tendinitis of the quadriceps as well as the gastrocnemius insertion as likely cause.  The knee itself is without inflammation or tenderness.    I do not suspect inflammatory or infectious arthritis of the knee joint.    3. Acute bilateral low back pain without sciatica  Chronic lower back tightness with exacerbation with recent activities with tiling and then shoveling.    4. Crohn's disease of both small and large intestine with intestinal obstruction (H)  No flare.  On Remicade.    See patient instructions below.    I did discuss the possibility of some element of autoimmune Crohn's associated inflammatory arthritis, although not clear on today's exam.  He was told to return for further evaluation if symptoms do not improve, or if they change.  Could consider lab work for white count and sed rate evaluation.    Mild chronic postnasal  drip type cough, but otherwise normal respiratory exam.    He is due for a routine health maintenance physical later this spring or summer.    Return in about 2 months (around 6/11/2019) for Annual physical.   Patient Instructions   Emphasize gentle stretching of your lower back, gluteal muscles, and calf muscles on a daily basis.  Continue regular walking but do not over strain your self or go on extended walks.    Avoid long car rides or prolonged sitting in one position.    Avoid prolonged strain activity such as shoveling.    Tylenol 2-3 times a day is okay.    You can use ibuprofen sparingly for more severe pain.    If you develop a fever, red swollen joints, or worsening or changing symptoms, return for reevaluation.    You are due for a physical exam later this spring or early summer.    Jorge Mooney MD  I spent a total time with patient of over 25 minutes and over 50% coord care.  Time all face to face.      Current Outpatient Medications   Medication Sig Dispense Refill     ascorbic acid, vitamin C, (VITAMIN C) 1000 MG tablet Take 1,000 mg by mouth daily.       calcium citrate (CALCITRATE) 200 mg (950 mg) tablet Take 2 tablets by mouth 2 (two) times a day.       cholecalciferol, vitamin D3, 1,000 unit tablet Take 2,000 Units by mouth daily.        cyanocobalamin, vitamin B-12, 2,500 mcg Tab Take 1,250 mcg by mouth daily.       folic acid (FOLVITE) 1 MG tablet Take 1 mg by mouth as needed.        INFLIXIMAB (REMICADE IV) Infuse 1 Dose into a venous catheter. Every 6 weeks       No current facility-administered medications for this visit.      Allergies   Allergen Reactions     Mercaptopurine Myalgia     Extreme muscle weakness, required admission to hospital.      Social History     Tobacco Use     Smoking status: Never Smoker     Smokeless tobacco: Never Used   Substance Use Topics     Alcohol use: Yes     Alcohol/week: 0.6 oz     Types: 1 Cans of beer per week     Comment: once a month     Drug use: No

## 2021-05-27 NOTE — PATIENT INSTRUCTIONS - HE
Emphasize gentle stretching of your lower back, gluteal muscles, and calf muscles on a daily basis.  Continue regular walking but do not over strain your self or go on extended walks.    Avoid long car rides or prolonged sitting in one position.    Avoid prolonged strain activity such as shoveling.    Tylenol 2-3 times a day is okay.    You can use ibuprofen sparingly for more severe pain.    If you develop a fever, red swollen joints, or worsening or changing symptoms, return for reevaluation.    You are due for a physical exam later this spring or early summer.

## 2021-05-28 ENCOUNTER — RECORDS - HEALTHEAST (OUTPATIENT)
Dept: ADMINISTRATIVE | Facility: CLINIC | Age: 66
End: 2021-05-28

## 2021-05-29 ENCOUNTER — RECORDS - HEALTHEAST (OUTPATIENT)
Dept: ADMINISTRATIVE | Facility: CLINIC | Age: 66
End: 2021-05-29

## 2021-05-30 ENCOUNTER — RECORDS - HEALTHEAST (OUTPATIENT)
Dept: ADMINISTRATIVE | Facility: CLINIC | Age: 66
End: 2021-05-30

## 2021-05-30 VITALS — BODY MASS INDEX: 22.84 KG/M2 | WEIGHT: 177.9 LBS

## 2021-05-30 VITALS — WEIGHT: 176 LBS

## 2021-05-30 NOTE — PATIENT INSTRUCTIONS - HE
Results of today's lab work will be mailed to you.    No change in medication treatment plan.  Continue to follow-up with gastroenterology as planned.  Proceed with colonoscopy this fall.    See ophthalmology this summer for your yearly checkup.    Consider a bone density with DEXA scan next year.    Continue daily walking.    You can contact me for a request to try Viagra in the future, if you wish.

## 2021-05-30 NOTE — PROGRESS NOTES
[facility] clinic Follow Up Note    Elvin Castro   63 y.o. male    Date of Visit: 7/10/2019    Chief Complaint   Patient presents with     Annual Exam     No Fasting     Dizziness     Subjective  Elvin is here for health maintenance physical exam.    His main issue is Crohn's disease diagnosed in 1986.  He had 2 episodes of bowel resection including his ileum and 86 and 93.  He has a diverting colostomy that is working well.  Some dermatitis around the colostomy opening is being managed with his dermatologist, he does use a topical cream.  Has been better.    August 2017 colonoscopy that did not show polyp or colitis.  There was a stricture that was dilated.    EGD in 2010 normal.  No oral lesions.    Has been off methotrexate for 3 years he is just on Remicade now.    He did have some slight increased stool frequency with his ostomy and took Canasa for 2 weeks that resolved the symptoms.  He still has some left that he could reuse for exacerbation in the future.  He has not used steroids since 2006.    Is a colonoscopy 2-year planned in October of this year.  He is on Nexium.  No epigastric pain.    Gold TB quant interferon test was negative last year.    Lives independently with wife.  He cares for his elderly father with Alzheimer's.    He has a diagnosis of B12 deficiency but he manages with an oral B12 supplement.  He states he actually had a high B12 level in the past and no longer does IM.    Bone density 2015 with a spine score of -1.0.  Femur score -0.8/-1.2 .  No history of fracture.    Patient walks and is quite active every day.    He has moderate and osteoarthritic changes but no major flare.  No inflammatory type arthritis.  He has not seen a rheumatologist.  Uses Aspercreme topically.    He does not use NSAIDs.    No history of iritis.    Saw ophthalmology last year.  No glaucoma.  His father had glaucoma.    No new headaches or vision changes.    Normal TSH last year.    No history of  "alcohol or sleep apnea.  May 2017 normal echo.  April 2018 normal chest x-ray.    No new cough.    He is never smoked.    Blood pressure is always low.    His father had coronary artery disease with a bypass.  Patient's cholesterol is been excellent with his ileal resection.    His mother had diabetes.  Patient has not had diabetes.  Patient's hemoglobin A1c was 5.1% last November.    Urinating normally.  PSA was 0.3 November 2018.  His father had prostate cancer.    Patient had some hip pain in April that resolved.    He strained his left ankle recently but that is better.  He has some mild knee achiness on and off but that is improving, since June 17.    Lives independently with wife.  No mood or anxiety issues or memory issues.  Diet is good.    No chest pain or palpitations.    PMHx:    Past Medical History:   Diagnosis Date     Bowel obstruction (H)      Crohn's colitis (H)      Primary osteoarthritis of both hands 11/6/2018     PSHx:    Past Surgical History:   Procedure Laterality Date     ABDOMINAL SURGERY       COLON SURGERY       DC PART REMOVAL COLON W COLOSTOMY      Description: Partial Colectomy With Colostomy;  Recorded: 05/10/2008;     Immunizations:   Immunization History   Administered Date(s) Administered     DT (pediatric) 01/01/1990, 07/05/2000     Hep A, historic 06/03/2008, 12/30/2008     Hep B, historic 06/03/2008, 06/30/2008, 12/30/2008     Influenza W3x8-06, 12/12/2009     Influenza, Seasonal, Inj PF IIV3 10/04/2011     Influenza, inj, historic,unspecified 10/15/2017     Pneumo Polysac 23-V 06/03/2008, 07/05/2013     Pneumococcal Vaccine, Unspecified 05/30/2014, 04/30/2019     Td,adult,historic,unspecified 06/03/2008     Tdap 06/03/2008       ROS A comprehensive review of systems was performed and was otherwise negative    Medications, allergies, and problem list were reviewed and updated    Exam  /62   Pulse 78   Ht 6' 2\" (1.88 m)   Wt 180 lb 9 oz (81.9 kg)   SpO2 96%   BMI 23.18 " kg/m    Thin patient.  Alert and oriented x3.  Good mood and affect.  Pupils and irises equal and reactive.  Extraocular muscles intact.  No jaundice or conjunctivitis.  External ears and nose exam is normal and tympanic membranes are normal.  Normal pharynx, not crowded.  No pharyngitis or oral lesions or ulcers condition.  No cervical or supraclavicular adenopathy.  No JVD and no carotid bruits.  No thyromegaly or nodularity.  Lungs are clear to auscultation with good respiratory excursion.  Heart is regular with no murmur rub or gallop.  No diastolic murmur.  Abdomen is thin, nontender nondistended.  No hepatosplenomegaly.  Moderate dermatitis around the ostomy, better than previous.  Ostomy otherwise appears healthy.  No ankle edema.  +1 pedal pulses.  Good range of motion on the knee, no knee effusion or erythema.  Skin exam appears normal, no suspicious skin lesions.  Mobility exam is good, able to clamp in the exam table, gait normal.    Assessment/Plan  1. Healthcare maintenance  He confirmed full CODE STATUS wishes.    Maintain continued regular activity and daily walking.  Eats a healthy diet.    Overall low cardiovascular risk except for his family history with father.    PSA recheck next year as it was just done in November.    Family history of glaucoma, he will make his yearly eye appointment this year.    2. Crohn's disease of both small and large intestine with intestinal obstruction (H)  Quiesced sent.  Possible mild early flare earlier this spring resolved with Canasa, and can restart as needed, under the direction of gastroenterology.    Continue Remicade.  Follow-up with GI for yearly follow-up next April.    2-year colonoscopy will be due in October and coordinated through gastroenterology.  - Comprehensive Metabolic Panel  - HM2(CBC w/o Differential)    He is up-to-date on immunizations including Zostavax and tetanus.  He apparently was given a another pneumococcal 23 vaccine in April.  He is  up-to-date on pneumococcal vaccine.    Osteopenia, consider repeat DEXA scan next year.    3. Vitamin B12 deficiency (non anemic)  I suspect vitamin B12 level is okay, given previous elevated level and management with oral B12.  Confirm with lab check today.  - Vitamin B12    4. Primary osteoarthritis of both hands  No sign of Crohn's associated inflammatory arthritis.  Avoiding NSAIDs with Crohn's.    5. Family history of coronary artery disease in father  Overall low cardiovascular risk as above.  Not anticipating a statin.  Low cholesterol levels with his ileal section.  - LDL Cholesterol, Direct  - HDL Cholesterol    6. Erectile dysfunction, unspecified erectile dysfunction type  We discussed briefly at the end of the visit possibility to try Viagra.  He decided not to get a prescription today but could contact me if needed.  I did warn him on risk of lightheaded dizzy spells nasal congestion, he does not take nitroglycerin.  Would consider sildenafil 50 mg tablets as needed if needed.    An episode of positional vertigo that happened a month and a half ago after using a muscle vibrating massage device behind his right ear.  Those symptoms have completely resolved.    He has some moderate hard of hearing is going to see an audiologist.  Moderate chronic tinnitus.  He may consider hearing aid.    Ankle and knee strain pain, improving.  Can follow-up with orthopedic clinic if needed.    Return in about 1 year (around 7/10/2020) for Annual physical.   Patient Instructions   Results of today's lab work will be mailed to you.    No change in medication treatment plan.  Continue to follow-up with gastroenterology as planned.  Proceed with colonoscopy this fall.    See ophthalmology this summer for your yearly checkup.    Consider a bone density with DEXA scan next year.    Continue daily walking.    You can contact me for a request to try Viagra in the future, if you wish.    Jorge Mooney MD      Current Outpatient  Medications   Medication Sig Dispense Refill     ascorbic acid, vitamin C, (VITAMIN C) 1000 MG tablet Take 1,000 mg by mouth daily.       calcium citrate (CALCITRATE) 200 mg (950 mg) tablet Take 2 tablets by mouth 2 (two) times a day.       cholecalciferol, vitamin D3, 1,000 unit tablet Take 2,000 Units by mouth daily.        cyanocobalamin, vitamin B-12, 2,500 mcg Tab Take 1,250 mcg by mouth daily.       folic acid (FOLVITE) 1 MG tablet Take 1 mg by mouth as needed.        INFLIXIMAB (REMICADE IV) Infuse 1 Dose into a venous catheter. Every 6 weeks       No current facility-administered medications for this visit.      Allergies   Allergen Reactions     Mercaptopurine Myalgia     Extreme muscle weakness, required admission to hospital.      Social History     Tobacco Use     Smoking status: Never Smoker     Smokeless tobacco: Never Used   Substance Use Topics     Alcohol use: Yes     Alcohol/week: 0.6 oz     Types: 1 Cans of beer per week     Comment: once a month     Drug use: No

## 2021-05-31 ENCOUNTER — RECORDS - HEALTHEAST (OUTPATIENT)
Dept: ADMINISTRATIVE | Facility: CLINIC | Age: 66
End: 2021-05-31

## 2021-05-31 VITALS — WEIGHT: 180.3 LBS | BODY MASS INDEX: 23.15 KG/M2

## 2021-05-31 VITALS — WEIGHT: 168.9 LBS | BODY MASS INDEX: 21.69 KG/M2

## 2021-05-31 VITALS — BODY MASS INDEX: 21.56 KG/M2 | HEIGHT: 74 IN | WEIGHT: 168 LBS

## 2021-06-01 VITALS — WEIGHT: 180 LBS | BODY MASS INDEX: 23.11 KG/M2

## 2021-06-01 VITALS — WEIGHT: 181.6 LBS | BODY MASS INDEX: 23.32 KG/M2

## 2021-06-02 VITALS — BODY MASS INDEX: 23.4 KG/M2 | WEIGHT: 182.31 LBS | HEIGHT: 74 IN

## 2021-06-02 VITALS — WEIGHT: 183.2 LBS | BODY MASS INDEX: 23.84 KG/M2

## 2021-06-03 VITALS — BODY MASS INDEX: 23.17 KG/M2 | WEIGHT: 180.56 LBS | HEIGHT: 74 IN

## 2021-06-04 VITALS
HEIGHT: 74 IN | WEIGHT: 189 LBS | HEIGHT: 74 IN | BODY MASS INDEX: 24.26 KG/M2 | WEIGHT: 189 LBS | BODY MASS INDEX: 24.26 KG/M2

## 2021-06-04 VITALS — WEIGHT: 190 LBS | BODY MASS INDEX: 24.39 KG/M2

## 2021-06-04 VITALS
BODY MASS INDEX: 24.33 KG/M2 | OXYGEN SATURATION: 97 % | SYSTOLIC BLOOD PRESSURE: 136 MMHG | TEMPERATURE: 98.2 F | DIASTOLIC BLOOD PRESSURE: 78 MMHG | WEIGHT: 189.5 LBS | HEART RATE: 78 BPM

## 2021-06-04 VITALS — BODY MASS INDEX: 24.38 KG/M2 | HEIGHT: 74 IN | WEIGHT: 190 LBS

## 2021-06-04 VITALS
WEIGHT: 182.3 LBS | BODY MASS INDEX: 23.41 KG/M2 | DIASTOLIC BLOOD PRESSURE: 78 MMHG | TEMPERATURE: 97.7 F | SYSTOLIC BLOOD PRESSURE: 122 MMHG | HEART RATE: 79 BPM | OXYGEN SATURATION: 99 %

## 2021-06-04 VITALS — BODY MASS INDEX: 24.26 KG/M2 | WEIGHT: 189 LBS | HEIGHT: 74 IN

## 2021-06-04 VITALS
DIASTOLIC BLOOD PRESSURE: 70 MMHG | OXYGEN SATURATION: 98 % | BODY MASS INDEX: 24.28 KG/M2 | TEMPERATURE: 98.2 F | WEIGHT: 189.1 LBS | HEART RATE: 82 BPM | SYSTOLIC BLOOD PRESSURE: 124 MMHG

## 2021-06-04 VITALS — WEIGHT: 190 LBS | BODY MASS INDEX: 24.38 KG/M2 | HEIGHT: 74 IN

## 2021-06-04 VITALS — BODY MASS INDEX: 24.39 KG/M2 | WEIGHT: 190 LBS

## 2021-06-04 NOTE — PROGRESS NOTES
"Assessment/Plan:     1. Dizziness  - HM2(CBC w/o Differential)  - Comprehensive Metabolic Panel  - Vitamin B12  - Thyroid Cascade  - Electrocardiogram Perform and Read    2. Nonintractable episodic headache, unspecified headache type    3. Night sweats    4. LAFB (left anterior fascicular block)    Patient appears well in clinic.  EKG completed.  Personally reviewed as normal sinus rhythm.  Cardiology noted a left anterior fascicular block.  I do not think this is the cause of patient's dizziness.  Labs are pending.  Low suspicion for vertigo, as his symptoms are intermittent and characteristics of his dizziness do not seem consistent.  Possible viral illness given his wife's recent illness, but he is not having any URI symptoms I did encourage patient to decrease his caffeine use and increase his water intake.  Encouraged rest.  May continue Tylenol as needed for headaches.  Will wait for labs and treat if indicated.  Will need to monitor his symptoms for now as I do not see any obvious cause.  Encouraged follow up with PCP after he returns home from his trip if symptoms do not resolve.    Subjective:     Elvin Castro is a 64 y.o. male who presents with complaints of frontal headaches and dizziness.  Symptoms started a couple of weeks ago and are intermittent.  Reports pressure at the crown of his head that comes and goes.  This seems to be a little worse at night.  Also endorses a \"floating\" sensation that is worse at night when he is laying flat.  It is normal for him to get some dizziness if he stands up too quickly, but this feels different.  He denies a \"spinning\" sensation.  No nausea/vomiting.  He is also been waking up with some hot flashes and feeling very sweaty in the middle of the night.  This is new for him.  His sleep is generally very poor, but this is normal for him.  He is feeling more tired and generally achy.  Denies any fevers, weight loss, sinus congestion, ear pain, sore throat, or cough.  " History of Crohn's disease that is well managed with Remicade infusions every 6 months.  He does get some intermittent palpitations, but no chest pain or shortness of breath.  He denies any ear pain or pressure.  Patient's wife has been getting over a virus the last couple of weeks.  She had more upper respiratory symptoms.  Patient drinks Mountain Dew throughout the day.  He is generally not great with water intake.  Patient has been taking some Tylenol for his low-grade headaches, but otherwise no other treatments tried.      The following portions of the patient's history were reviewed and updated as appropriate: allergies, current medications.    Review of Systems  A comprehensive review of systems was performed and was otherwise negative    Objective:     /78   Pulse 79   Temp 97.7  F (36.5  C)   Wt 182 lb 4.8 oz (82.7 kg)   SpO2 99%   BMI 23.41 kg/m      General Appearance: Alert, cooperative, no distress, appears stated age  Head: Normocephalic, without obvious abnormality, atraumatic  Eyes: PERRL, conjunctiva/corneas clear, EOM's intact. No nystagmus.   Ears: Normal TM's and external ear canals, both ears  Nose: Nares normal, septum midline,mucosa normal, no drainage  Throat: Lips, mucosa, and tongue normal; teeth and gums normal  Neck: Supple, symmetrical, trachea midline, no adenopathy  Back: no CVA tenderness  Lungs: Clear to auscultation bilaterally, respirations unlabored  Heart: Regular rate and rhythm, S1 and S2 normal, no murmur, rub, or gallop   Abdomen: Soft, non-tender, bowel sounds active all four quadrants,  no masses, no organomegaly  Lymph nodes: Cervical, supraclavicular nodes normal  Neurologic: Alert and oriented x4.  Gait and coordination normal.  Answering questions appropriately.    Betty Watson, NOAH

## 2021-06-08 NOTE — TELEPHONE ENCOUNTER
Taking few meds for swelling in legs and feet.  Throat feels a little weird.    Has been on it 2 weeks.    Notices feet are swollen.    Had surgery last Monday, abcess drained.    No shortness of breath.    Wants to be seen in clinic today.    Transferred to scheduling for an appointment.  Sisi Manzo RN [facility] Triage Nurse Advisor        Reason for Disposition    MODERATE swelling of both ankles (e.g., swelling extends up to the knees) AND new onset or worsening    Additional Information    Negative: Sounds like a life-threatening emergency to the triager    Negative: Chest pain    Negative: Small area of swelling and followed an insect bite to the area    Negative: Followed a knee injury    Negative: Ankle or foot injury    Negative: Pregnant with leg swelling or edema    Negative: Difficulty breathing at rest    Negative: Entire foot is cool or blue in comparison to other side    Negative: SEVERE swelling (e.g., swelling extends above knee, entire leg is swollen, weeping fluid)    Negative: Thigh or calf pain and only 1 side and present > 1 hour    Negative: Thigh, calf, or ankle swelling in only one leg    Negative: Thigh, calf, or ankle swelling in both legs, but one side is definitely more swollen    Negative: Cast on leg or ankle and has increasing pain    Negative: Can't walk or can barely stand (new onset)    Negative: Fever and red area (or area very tender to touch)    Negative: Patient sounds very sick or weak to the triager    Negative: Swelling of face, arm or hands (Exception: slight puffiness of fingers during hot weather)    Negative: Pregnant > 20 weeks and sudden weight gain (i.e., > 2 lbs, 1 kg in one week)    Protocols used: LEG SWELLING AND EDEMA-A-OH

## 2021-06-08 NOTE — PROGRESS NOTES
"Office Visit - Follow Up   Elvin Castro   64 y.o. male    Date of Visit: 6/18/2020    Chief Complaint   Patient presents with     Leg Swelling     Oral Swelling     Throat swelling     Blurred Vision     Head feels \"off\"        Assessment and Plan     Bilateral leg, ankle, and foot edema, right a little more than left, dry throat, and blurry vision, and fogginess in the head--I suspect that these are some sort of idiosyncratic side effect from the combination of ciprofloxacin and metronidazole antibiotic that he started on June 5 because of a perianal abscess which was explored on Monday, Morenita 15 (3 days ago), and for which he needs to have subsequent MRI pelvis imaging, and probably surgical reexploration, and for which he plans to continue on the antibiotics through Friday, June 26.  He is supposed to get his next Remicade infusion this coming Friday, June 19.    He told me that even before starting the antibiotic, he did notice just a twinge of tightness in his calves and a bit of ankle swelling.  These definitely became more prominent after he started the antibiotics on June 5.  These were mild, and did not come up during our conversation for his preop examination with me on June 11.    On June 11, he had satisfactory metabolic panel and CBC except for mild degree of anemia with hemoglobin 13.5.    Today, let us recheck his CBC, comprehensive metabolic panel, and also let us get a urinalysis to look for proteinuria.    I am less suspicious for blood clot, since both of his legs are affected (right a bit more than left), and also given the chronicity of his symptoms, with a gradual onset over the last week and a half that started after he began antibiotics.  I do not think a d-dimer blood test would help us, since it is likely to be tripped positive because of his recent surgery.  I told him to let me know if one leg gets much worse than the other, because then I might pursue getting an ultrasound to look for " blood clots.    He needs to be on antibiotics because of the abscess.  Let me see what his testing shows, and I will advise him as far as any antibiotic change.    The wound on his buttock is dressed.  The plan is for him to have an MRI of the pelvis on July 5 or 6, follow-up with a surgeon, and likely have surgical reexploration.    I did ask him to try to keep his legs elevated as much as possible when is not up and about.  Reduce the amount of dietary sodium.  But do not get dehydrated.    ADDENDUM: 1 PM, spoke to Dr. Kvng Baum at [facility].  Dr. Baum recommends that Mr. Castro proceed with the Remicade infusion tomorrow morning June 19.  If the abscess is a manifestation of Crohn's disease, interruption of the Remicade could cause it to flareup.  Dr. Baum told me that Mr. Castro has been on Remicade for years.  There is a potential complication of congestive heart failure.    I called Mr. Castro to relate to him Dr. Stovall's recommendations to continue with Remicade.  I also told Mr. Castro that I will order a echocardiogram for him.  I told him the CBC is coming back pretty much the same as a week ago, mild anemia.  Still waiting on the comprehensive metabolic panel and urinalysis.    Remicade for his Crohn's disease, under the supervision of Dr. Kvng Baum at [facility], has been on Remicade for about 15 years.  No longer on methotrexate.  He is scheduled to have Remicade infusion this Friday, June 19.  I will have today's blood test results back by tomorrow morning.  I would like him to asked Dr. Baum to confirm whether the Remicade should be given.     Colostomy left lower quadrant.  Approximately 12 cm of rectosigmoid remnant.     Osteopenia demonstrated on DEXA bone density scan from 2015, with a 10% decline over 1 decade.  I have gone ahead and ordered a updated DEXA bone density scan for him.     Vitamin B12 deficiency, on high-dose oral replacement     Left anterior  hemiblock on EKG, stable, asymptomatic    Colostomy left lower quadrant.  Approximately 12 cm of rectosigmoid remnant.         Wt Readings from Last 3 Encounters:   06/11/20 189 lb 8 oz (86 kg)   12/16/19 182 lb 4.8 oz (82.7 kg)   07/10/19 180 lb 9 oz (81.9 kg)              History of Present Illness   This 64 y.o. old man comes in for evaluation of    Bilateral leg, ankle, and foot edema, right a little more than left, dry throat, and blurry vision, and fogginess in the head--I suspect that these are some sort of idiosyncratic side effect from the combination of ciprofloxacin and metronidazole antibiotic that he started on June 5 because of a perianal abscess which was explored on Monday, Morenita 15 (3 days ago), and for which he needs to have subsequent MRI pelvis imaging, and probably surgical reexploration, and for which he plans to continue on the antibiotics through Friday, June 26.  He is supposed to get his next Remicade infusion this coming Friday, June 19.    He told me that even before starting the antibiotic, he did notice just a twinge of tightness in his calves and a bit of ankle swelling.  These definitely became more prominent after he started the antibiotics on June 5.  These were mild, and did not come up during our conversation for his preop examination with me on June 11.    On June 11, he had satisfactory metabolic panel and CBC except for mild degree of anemia with hemoglobin 13.5.    I saw him for preop exam on June 11 because of  scheduled drainage of abscess and examination under anesthesia for perianal abscess in the context of long history of Crohn's disease.     He is on Remicade for his Crohn's disease, under the supervision of Dr. Kvng Baum at [facility], has been on Remicade for about 15 years.  No longer on methotrexate.     Colostomy left lower quadrant.  Approximately 12 cm of rectosigmoid remnant.     Osteopenia demonstrated on DEXA bone density scan from 2015,  with a 10% decline over 1 decade.  I have gone ahead and ordered a updated DEXA bone density scan for him.     Vitamin B12 deficiency, on high-dose oral replacement     Left anterior hemiblock on EKG, stable, asymptomatic    Colostomy left lower quadrant.  Approximately 12 cm of rectosigmoid remnant.         Wt Readings from Last 3 Encounters:   06/11/20 189 lb 8 oz (86 kg)   12/16/19 182 lb 4.8 oz (82.7 kg)   07/10/19 180 lb 9 oz (81.9 kg)     Crohn's disease diagnosed in 1986.  He had 2 episodes of bowel resection including his ileum and 86 and 93.  He has a diverting colostomy that is working well.  Some dermatitis around the colostomy opening is being managed with his dermatologist, he does use a topical cream     EGD in 2010 normal.  No oral lesions.  Has been off methotrexate for 3 years he is just on Remicade now.     Gold TB quant interferon test was negative 2018     Lives independently with wife.  He cares for his elderly father with Alzheimer's.     He has a diagnosis of B12 deficiency but he manages with an oral B12 supplement.  He states he actually had a high B12 level in the past and no longer does IM.     Bone density 2015 with a spine score of -1.0.  Femur score -0.8/-1.2 .  No history of fracture.    Wt Readings from Last 3 Encounters:   06/18/20 189 lb 1.6 oz (85.8 kg)   06/15/20 189 lb (85.7 kg)   06/11/20 189 lb 8 oz (86 kg)     BP Readings from Last 3 Encounters:   06/18/20 124/70   06/15/20 123/68   06/11/20 136/78       Lab Results   Component Value Date    WBC 11.0 06/11/2020    HGB 13.5 (L) 06/11/2020    HCT 41.0 06/11/2020     06/11/2020    HDL 48 07/10/2019    LDLDIRECT 59 07/10/2019    ALT 12 12/16/2019    AST 18 12/16/2019     06/11/2020    K 3.9 06/11/2020     06/11/2020    CREATININE 1.13 06/11/2020    BUN 8 06/11/2020    CO2 24 06/11/2020    TSH 3.50 12/16/2019    PSA 0.3 11/06/2018    INR 1.31 (H) 05/04/2017    HGBA1C 5.1 11/06/2018        Medications, Allergies,  Social, and Problem List   Current Outpatient Medications   Medication Sig Dispense Refill     ciprofloxacin HCl (CIPRO) 500 MG tablet Take 1 tablet by mouth every 12 hours for 14 days       inFLIXimab (REMICADE) 100 mg injection Administer Remicade 10mg/kg every six weeks, infuse by IV route       metroNIDAZOLE (FLAGYL) 500 MG tablet Take 1 tablet by mouth 2 times every day for 14 days       ascorbic acid, vitamin C, (VITAMIN C) 1000 MG tablet Take 1,000 mg by mouth daily.       calcium citrate (CALCITRATE) 200 mg (950 mg) tablet Take 2 tablets by mouth 2 (two) times a day.       cholecalciferol, vitamin D3, 1,000 unit tablet Take 2,000 Units by mouth daily.        cyanocobalamin, vitamin B-12, 2,500 mcg Tab Take 1,250 mcg by mouth daily.       esomeprazole (NEXIUM) 20 MG capsule Take 2 capsules by mouth.       No current facility-administered medications for this visit.      Allergies   Allergen Reactions     Mercaptopurine Myalgia     Extreme muscle weakness, required admission to hospital.      Social History     Tobacco Use     Smoking status: Never Smoker     Smokeless tobacco: Never Used   Substance Use Topics     Alcohol use: Yes     Alcohol/week: 1.0 standard drinks     Types: 1 Cans of beer per week     Comment: once a month     Drug use: No     Patient Active Problem List   Diagnosis     Immunodeficiency (H)     Crohn's disease of both small and large intestine with intestinal obstruction (H)     Vitamin B12 deficiency (non anemic)     Dermatitis     Primary osteoarthritis of both hands     LAFB (left anterior fascicular block)     Osteopenia of multiple sites      Reviewed, reconciled and updated       Physical Exam   General Appearance:   Appears well, breathing comfortably, note normal temperature and vital signs    /70 (Patient Site: Right Arm, Patient Position: Sitting, Cuff Size: Adult Large)   Pulse 82   Temp 98.2  F (36.8  C) (Oral)   Wt 189 lb 1.6 oz (85.8 kg)   SpO2 98%   BMI 24.28  kg/m      1+ ankle edema, a bit more on the right than on the left, but definitely bilateral.  I could not find any areas of calf tenderness, nor any tenderness in the popliteal fossa on either side or medial thighs along the course of the femoral veins or in either inguinal areas.       Additional Information   I spent 25 minutes face time with the patient, with > 50% counseling, explaining and discussing with the patient the issues enumerated in the Assessment and Plan section of this note and answering questions. Afterwards, the patient was given a printout of the AVS, with attention to the content in the Patient Instruction section.       Kyle Castañeda MD

## 2021-06-08 NOTE — ANESTHESIA CARE TRANSFER NOTE
Last vitals:   Vitals:    06/15/20 1312   BP: 113/57   Pulse: 95   Resp: 16   Temp: 36.6  C (97.9  F)   SpO2: 98%     Patient's level of consciousness is drowsy  Spontaneous respirations: yes  Maintains airway independently: yes  Dentition unchanged: yes  Oropharynx: oropharynx clear of all foreign objects    QCDR Measures:  ASA# 20 - Surgical Safety Checklist: WHO surgical safety checklist completed prior to induction    PQRS# 430 - Adult PONV Prevention: 4558F - Pt received => 2 anti-emetic agents (different classes) preop & intraop  ASA# 8 - Peds PONV Prevention: NA - Not pediatric patient, not GA or 2 or more risk factors NOT present  PQRS# 424 - Rosa-op Temp Management: 4559F - At least one body temp DOCUMENTED => 35.5C or 95.9F within required timeframe  PQRS# 426 - PACU Transfer Protocol: - Transfer of care checklist used  ASA# 14 - Acute Post-op Pain: ASA14B - Patient did NOT experience pain >= 7 out of 10

## 2021-06-08 NOTE — PATIENT INSTRUCTIONS - HE
Satisfactory preoperative medical exam for scheduled drainage of abscess and examination under anesthesia for perianal abscess in the context of long history of Crohn's disease.    Today for preop testing we will get a CBC and basic metabolic panel.  Today's EKG from June 11, 2020 shows normal sinus rhythm at a rate of 76, leftward axis of -50 degrees, left anterior hemiblock, no significant change compared to December 2019.    He will have his COVID screening test tomorrow morning June 12    He is not experiencing any cardiac or pulmonary symptoms.  He does have history of a severe pneumonia in  2017 with sepsis, but is now fully recovered.  He is never smoker.  Consumes 0 alcohol these days.    He is on ciprofloxacin and metronidazole started on June 5 as initial medical management for the abscess.  He thinks that the pain improved by about 50%.    He is on Remicade for his Crohn's disease, under the supervision of Dr. Kvng Baum at [facility], has been on Remicade for about 15 years.  No longer on methotrexate.    Colostomy left lower quadrant.  Approximately 12 cm of rectosigmoid remnant.    Osteopenia demonstrated on  DEXA bone density scan from 2015, with a 10% decline over 1 decade.  I have gone ahead and ordered a updated DEXA bone density scan for him.    Vitamin B12 deficiency, on high-dose oral replacement

## 2021-06-08 NOTE — H&P (VIEW-ONLY)
Preoperative Exam    Scheduled Procedure: EXAM UNDER ANESTHESIA WITH DRAINAGE OF ABSCESS   Surgery Date:  6/15/2020  Surgery Location: [facility], fax [phone]    Surgeon:  Dr. Ornelas    Assessment/Plan:     Satisfactory preoperative medical exam for scheduled drainage of abscess and examination under anesthesia for perianal abscess in the context of long history of Crohn's disease.    Today for preop testing we will get a CBC and basic metabolic panel.  Today's EKG from June 11, 2020 shows normal sinus rhythm at a rate of 76, leftward axis of -50 degrees, left anterior hemiblock, no significant change compared to December 2019.    LATER: Basic metabolic panel and CBC are entirely normal except for slightly reduced hemoglobin of 13.5, still satisfactory for preop purposes.  Results at the bottom of this document.    He will have his COVID screening test tomorrow morning June 12     He is not experiencing any cardiac or pulmonary symptoms.  He does have history of a severe pneumonia in  2017 with sepsis, but is now fully recovered.  He is never smoker.  Consumes 0 alcohol these days.  No history of any bleeding or clotting problems.  No history of severe anesthesia complications.    He is on ciprofloxacin and metronidazole started on June 5 as initial medical management for the abscess.  He thinks that the pain improved by about 50%.    He is on Remicade for his Crohn's disease, under the supervision of Dr. Kvng Baum at [facility], has been on Remicade for about 15 years.  No longer on methotrexate.    Colostomy left lower quadrant.  Approximately 12 cm of rectosigmoid remnant.    Osteopenia demonstrated on DEXA bone density scan from 2015, with a 10% decline over 1 decade.  I have gone ahead and ordered a updated DEXA bone density scan for him.    Vitamin B12 deficiency, on high-dose oral replacement    Left anterior hemiblock on EKG, stable, asymptomatic      Surgical Procedure  Risk: Low (reported cardiac risk generally < 1%)  Have you had prior anesthesia?: Yes  Have you or any family members had a previous anesthesia reaction:  No  Do you or any family members have a history of a clotting or bleeding disorder?: No  Cardiac Risk Assessment: no increased risk for major cardiac complications    APPROVAL GIVEN to proceed with proposed procedure, without further diagnostic evaluation    Functional Status: Independent  Patient plans to recover at home with family.     Subjective:      Elvin Castro is a 64 y.o. male who presents for a preoperative consultation.    He will have his COVID screening test tomorrow morning June 12    He is not experiencing any cardiac or pulmonary symptoms.  He does have history of a severe pneumonia in  2017 with sepsis, but is now fully recovered.  He is never smoker.  Consumes 0 alcohol these days.  No history of any bleeding or clotting problems.  No history of severe anesthesia complications.    He is on ciprofloxacin and metronidazole started on June 5 as initial medical management for the abscess.  He thinks that the pain improved by about 50%.    He is on Remicade for his Crohn's disease, under the supervision of Dr. Kvng Baum at [facility], has been on Remicade for about 15 years.  No longer on methotrexate.    Colostomy left lower quadrant.  Approximately 12 cm of rectosigmoid remnant.    Wt Readings from Last 3 Encounters:   06/11/20 189 lb 8 oz (86 kg)   12/16/19 182 lb 4.8 oz (82.7 kg)   07/10/19 180 lb 9 oz (81.9 kg)     Crohn's disease diagnosed in 1986.  He had 2 episodes of bowel resection including his ileum and 86 and 93.  He has a diverting colostomy that is working well.  Some dermatitis around the colostomy opening is being managed with his dermatologist, he does use a topical cream    EGD in 2010 normal.  No oral lesions.  Has been off methotrexate for 3 years he is just on Remicade now.    Gold TB quant interferon test  was negative 2018     Lives independently with wife.  He cares for his elderly father with Alzheimer's.     He has a diagnosis of B12 deficiency but he manages with an oral B12 supplement.  He states he actually had a high B12 level in the past and no longer does IM.     Bone density 2015 with a spine score of -1.0.  Femur score -0.8/-1.2 .  No history of fracture.    All other systems reviewed and are negative, other than those listed in the HPI.    Pertinent History  Do you have difficulty breathing or chest pain after walking up a flight of stairs: No  History of obstructive sleep apnea: No  Steroid use in the last 6 months: No  Frequent Aspirin/NSAID use: No  Prior Blood Transfusion: No  Prior Blood Transfusion Reaction: No  If for some reason prior to, during or after the procedure, if it is medically indicated, would you be willing to have a blood transfusion?:  There is no transfusion refusal.    Current Outpatient Medications   Medication Sig Dispense Refill     ascorbic acid, vitamin C, (VITAMIN C) 1000 MG tablet Take 1,000 mg by mouth daily.       calcium citrate (CALCITRATE) 200 mg (950 mg) tablet Take 2 tablets by mouth 2 (two) times a day.       cholecalciferol, vitamin D3, 1,000 unit tablet Take 2,000 Units by mouth daily.        cyanocobalamin, vitamin B-12, 2,500 mcg Tab Take 1,250 mcg by mouth daily.       folic acid (FOLVITE) 1 MG tablet Take 1 mg by mouth as needed.        INFLIXIMAB (REMICADE IV) Infuse 1 Dose into a venous catheter. Every 6 weeks       No current facility-administered medications for this visit.         Allergies   Allergen Reactions     Mercaptopurine Myalgia     Extreme muscle weakness, required admission to hospital.        Patient Active Problem List   Diagnosis     CAP (community acquired pneumonia)     Sepsis (H)     Immunodeficiency (H)     Streptococcal bacteremia     Crohn's disease of both small and large intestine with intestinal obstruction (H)     Vitamin B12  deficiency (non anemic)     Dermatitis     Primary osteoarthritis of both hands     LAFB (left anterior fascicular block)       Past Medical History:   Diagnosis Date     Bowel obstruction (H)      Crohn's colitis (H)      Primary osteoarthritis of both hands 11/6/2018       Past Surgical History:   Procedure Laterality Date     ABDOMINAL SURGERY       COLON SURGERY       CT PART REMOVAL COLON W COLOSTOMY      Description: Partial Colectomy With Colostomy;  Recorded: 05/10/2008;       Social History     Socioeconomic History     Marital status:      Spouse name: Not on file     Number of children: Not on file     Years of education: Not on file     Highest education level: Not on file   Occupational History     Not on file   Social Needs     Financial resource strain: Not on file     Food insecurity     Worry: Not on file     Inability: Not on file     Transportation needs     Medical: Not on file     Non-medical: Not on file   Tobacco Use     Smoking status: Never Smoker     Smokeless tobacco: Never Used   Substance and Sexual Activity     Alcohol use: Yes     Alcohol/week: 1.0 standard drinks     Types: 1 Cans of beer per week     Comment: once a month     Drug use: No     Sexual activity: Yes   Lifestyle     Physical activity     Days per week: Not on file     Minutes per session: Not on file     Stress: Not on file   Relationships     Social connections     Talks on phone: Not on file     Gets together: Not on file     Attends Taoist service: Not on file     Active member of club or organization: Not on file     Attends meetings of clubs or organizations: Not on file     Relationship status: Not on file     Intimate partner violence     Fear of current or ex partner: Not on file     Emotionally abused: Not on file     Physically abused: Not on file     Forced sexual activity: Not on file   Other Topics Concern     Not on file   Social History Narrative     Not on file       Objective:     /78  (Patient Site: Right Arm, Patient Position: Sitting, Cuff Size: Adult Large)   Pulse 78   Temp 98.2  F (36.8  C) (Oral)   Wt 189 lb 8 oz (86 kg)   SpO2 97%   BMI 24.33 kg/m        Physical Exam:  General: Alert, in no distress  Skin: No significant lesion seen.  Eyes/nose/throat: Eyes without scleral icterus, eye movements normal, pupils equal and reactive, oropharynx clear  MSK: Neck with good ROM  Lymphatic: Neck without adenopathy or masses  Pulm: Lungs clear to auscultation bilaterally  Cardiac: Heart with regular rate and rhythm, no murmur or gallop  GI: Abdomen soft, nontender. + Colostomy left lower quadrant  MSK: Extremities no tenderness or edema  Neuro: Moves all extremities, without focal weakness  Psych: Alert, normal mental status. Normal affect and speech    Labs:  Recent Results (from the past 48 hour(s))   Electrocardiogram Perform and Read    Collection Time: 06/11/20 10:59 AM   Result Value Ref Range    SYSTOLIC BLOOD PRESSURE      DIASTOLIC BLOOD PRESSURE      VENTRICULAR RATE 76 BPM    ATRIAL RATE 76 BPM    P-R INTERVAL 182 ms    QRS DURATION 100 ms    Q-T INTERVAL 404 ms    QTC CALCULATION (BEZET) 454 ms    P Axis 71 degrees    R AXIS -50 degrees    T AXIS 53 degrees    MUSE DIAGNOSIS       Normal sinus rhythm with sinus arrhythmia  Left anterior fascicular block  Abnormal ECG  When compared with ECG of 16-DEC-2019 10:33,  No significant change was found     Basic Metabolic Panel    Collection Time: 06/11/20 11:28 AM   Result Value Ref Range    Sodium 139 136 - 145 mmol/L    Potassium 3.9 3.5 - 5.0 mmol/L    Chloride 104 98 - 107 mmol/L    CO2 24 22 - 31 mmol/L    Anion Gap, Calculation 11 5 - 18 mmol/L    Glucose 103 70 - 125 mg/dL    Calcium 8.7 8.5 - 10.5 mg/dL    BUN 8 8 - 22 mg/dL    Creatinine 1.13 0.70 - 1.30 mg/dL    GFR MDRD Af Amer >60 >60 mL/min/1.73m2    GFR MDRD Non Af Amer >60 >60 mL/min/1.73m2   HM1 (CBC with Diff)    Collection Time: 06/11/20 11:28 AM   Result Value Ref  Range    WBC 11.0 4.0 - 11.0 thou/uL    RBC 4.54 4.40 - 6.20 mill/uL    Hemoglobin 13.5 (L) 14.0 - 18.0 g/dL    Hematocrit 41.0 40.0 - 54.0 %    MCV 90 80 - 100 fL    MCH 29.7 27.0 - 34.0 pg    MCHC 32.9 32.0 - 36.0 g/dL    RDW 11.8 11.0 - 14.5 %    Platelets 341 140 - 440 thou/uL    MPV 7.4 7.0 - 10.0 fL    Neutrophils % 58 50 - 70 %    Lymphocytes % 31 20 - 40 %    Monocytes % 8 2 - 10 %    Eosinophils % 3 0 - 6 %    Basophils % 1 0 - 2 %    Neutrophils Absolute 6.3 2.0 - 7.7 thou/uL    Lymphocytes Absolute 3.5 0.8 - 4.4 thou/uL    Monocytes Absolute 0.9 0.0 - 0.9 thou/uL    Eosinophils Absolute 0.3 0.0 - 0.4 thou/uL    Basophils Absolute 0.1 0.0 - 0.2 thou/uL        Immunization History   Administered Date(s) Administered     DT (pediatric) 01/01/1990, 07/05/2000     Hep A, historic 06/03/2008, 12/30/2008     Hep B, historic 06/03/2008, 06/30/2008, 12/30/2008     Influenza S1a2-07, 12/12/2009     Influenza, Seasonal, Inj PF IIV3 10/04/2011     Influenza, inj, historic,unspecified 10/15/2017     Pneumo Polysac 23-V 06/03/2008, 07/05/2013     Pneumococcal Vaccine, Unspecified 05/30/2014, 04/30/2019     Td,adult,historic,unspecified 06/03/2008     Tdap 06/03/2008       Electronically signed by Kyle Castañeda MD 06/11/20 10:46 AM

## 2021-06-08 NOTE — ANESTHESIA PREPROCEDURE EVALUATION
Anesthesia Evaluation      History of anesthetic complications: PONV.     Airway   Mallampati: II  Neck ROM: full   Pulmonary - negative ROS and normal exam                          Cardiovascular - negative ROS and normal exam  (+) dysrhythmias, ,      Neuro/Psych - negative ROS     Endo/Other - negative ROS      GI/Hepatic/Renal      Comments: crohn's          Dental - normal exam                        Anesthesia Plan  Planned anesthetic: general endotracheal    ASA 2   Induction: intravenous   Anesthetic plan and risks discussed with: patient  Anesthesia plan special considerations: rapid sequence induction,   Post-op plan: routine recovery

## 2021-06-08 NOTE — ANESTHESIA POSTPROCEDURE EVALUATION
Patient: Elvin Castro  Procedure(s):  EXAM UNDER ANESTHESIA WITH DRAINAGE OF ABSCESS  Anesthesia type: general    Patient location: PACU  Last vitals:   Vitals Value Taken Time   /71 6/15/2020  2:40 PM   Temp 36.4  C (97.6  F) 6/15/2020  1:58 PM   Pulse 85 6/15/2020  2:38 PM   Resp 16 6/15/2020  1:58 PM   SpO2 97 % 6/15/2020  2:38 PM   Vitals shown include unvalidated device data.  Post vital signs: stable  Level of consciousness: awake and responds to simple questions  Post-anesthesia pain: pain controlled  Post-anesthesia nausea and vomiting: no  Pulmonary: unassisted, return to baseline  Cardiovascular: stable and blood pressure at baseline  Hydration: adequate  Anesthetic events: no    QCDR Measures:  ASA# 11 - Rosa-op Cardiac Arrest: ASA11B - Patient did NOT experience unanticipated cardiac arrest  ASA# 12 - Rosa-op Mortality Rate: ASA12B - Patient did NOT die  ASA# 13 - PACU Re-Intubation Rate: ASA13B - Patient did NOT require a new airway mgmt  ASA# 10 - Composite Anes Safety: ASA10A - No serious adverse event    Additional Notes:

## 2021-06-08 NOTE — PROGRESS NOTES
Preoperative Exam    Scheduled Procedure: EXAM UNDER ANESTHESIA WITH DRAINAGE OF ABSCESS   Surgery Date:  6/15/2020  Surgery Location: [facility], fax [phone]    Surgeon:  Dr. Ornelas    Assessment/Plan:     Satisfactory preoperative medical exam for scheduled drainage of abscess and examination under anesthesia for perianal abscess in the context of long history of Crohn's disease.    Today for preop testing we will get a CBC and basic metabolic panel.  Today's EKG from June 11, 2020 shows normal sinus rhythm at a rate of 76, leftward axis of -50 degrees, left anterior hemiblock, no significant change compared to December 2019.    LATER: Basic metabolic panel and CBC are entirely normal except for slightly reduced hemoglobin of 13.5, still satisfactory for preop purposes.  Results at the bottom of this document.    He will have his COVID screening test tomorrow morning June 12     He is not experiencing any cardiac or pulmonary symptoms.  He does have history of a severe pneumonia in  2017 with sepsis, but is now fully recovered.  He is never smoker.  Consumes 0 alcohol these days.  No history of any bleeding or clotting problems.  No history of severe anesthesia complications.    He is on ciprofloxacin and metronidazole started on June 5 as initial medical management for the abscess.  He thinks that the pain improved by about 50%.    He is on Remicade for his Crohn's disease, under the supervision of Dr. Kvng Baum at [facility], has been on Remicade for about 15 years.  No longer on methotrexate.    Colostomy left lower quadrant.  Approximately 12 cm of rectosigmoid remnant.    Osteopenia demonstrated on DEXA bone density scan from 2015, with a 10% decline over 1 decade.  I have gone ahead and ordered a updated DEXA bone density scan for him.    Vitamin B12 deficiency, on high-dose oral replacement    Left anterior hemiblock on EKG, stable, asymptomatic      Surgical Procedure  Risk: Low (reported cardiac risk generally < 1%)  Have you had prior anesthesia?: Yes  Have you or any family members had a previous anesthesia reaction:  No  Do you or any family members have a history of a clotting or bleeding disorder?: No  Cardiac Risk Assessment: no increased risk for major cardiac complications    APPROVAL GIVEN to proceed with proposed procedure, without further diagnostic evaluation    Functional Status: Independent  Patient plans to recover at home with family.     Subjective:      Elvin Castro is a 64 y.o. male who presents for a preoperative consultation.    He will have his COVID screening test tomorrow morning June 12    He is not experiencing any cardiac or pulmonary symptoms.  He does have history of a severe pneumonia in  2017 with sepsis, but is now fully recovered.  He is never smoker.  Consumes 0 alcohol these days.  No history of any bleeding or clotting problems.  No history of severe anesthesia complications.    He is on ciprofloxacin and metronidazole started on June 5 as initial medical management for the abscess.  He thinks that the pain improved by about 50%.    He is on Remicade for his Crohn's disease, under the supervision of Dr. Kvng Baum at [facility], has been on Remicade for about 15 years.  No longer on methotrexate.    Colostomy left lower quadrant.  Approximately 12 cm of rectosigmoid remnant.    Wt Readings from Last 3 Encounters:   06/11/20 189 lb 8 oz (86 kg)   12/16/19 182 lb 4.8 oz (82.7 kg)   07/10/19 180 lb 9 oz (81.9 kg)     Crohn's disease diagnosed in 1986.  He had 2 episodes of bowel resection including his ileum and 86 and 93.  He has a diverting colostomy that is working well.  Some dermatitis around the colostomy opening is being managed with his dermatologist, he does use a topical cream    EGD in 2010 normal.  No oral lesions.  Has been off methotrexate for 3 years he is just on Remicade now.    Gold TB quant interferon test  was negative 2018     Lives independently with wife.  He cares for his elderly father with Alzheimer's.     He has a diagnosis of B12 deficiency but he manages with an oral B12 supplement.  He states he actually had a high B12 level in the past and no longer does IM.     Bone density 2015 with a spine score of -1.0.  Femur score -0.8/-1.2 .  No history of fracture.    All other systems reviewed and are negative, other than those listed in the HPI.    Pertinent History  Do you have difficulty breathing or chest pain after walking up a flight of stairs: No  History of obstructive sleep apnea: No  Steroid use in the last 6 months: No  Frequent Aspirin/NSAID use: No  Prior Blood Transfusion: No  Prior Blood Transfusion Reaction: No  If for some reason prior to, during or after the procedure, if it is medically indicated, would you be willing to have a blood transfusion?:  There is no transfusion refusal.    Current Outpatient Medications   Medication Sig Dispense Refill     ascorbic acid, vitamin C, (VITAMIN C) 1000 MG tablet Take 1,000 mg by mouth daily.       calcium citrate (CALCITRATE) 200 mg (950 mg) tablet Take 2 tablets by mouth 2 (two) times a day.       cholecalciferol, vitamin D3, 1,000 unit tablet Take 2,000 Units by mouth daily.        cyanocobalamin, vitamin B-12, 2,500 mcg Tab Take 1,250 mcg by mouth daily.       folic acid (FOLVITE) 1 MG tablet Take 1 mg by mouth as needed.        INFLIXIMAB (REMICADE IV) Infuse 1 Dose into a venous catheter. Every 6 weeks       No current facility-administered medications for this visit.         Allergies   Allergen Reactions     Mercaptopurine Myalgia     Extreme muscle weakness, required admission to hospital.        Patient Active Problem List   Diagnosis     CAP (community acquired pneumonia)     Sepsis (H)     Immunodeficiency (H)     Streptococcal bacteremia     Crohn's disease of both small and large intestine with intestinal obstruction (H)     Vitamin B12  deficiency (non anemic)     Dermatitis     Primary osteoarthritis of both hands     LAFB (left anterior fascicular block)       Past Medical History:   Diagnosis Date     Bowel obstruction (H)      Crohn's colitis (H)      Primary osteoarthritis of both hands 11/6/2018       Past Surgical History:   Procedure Laterality Date     ABDOMINAL SURGERY       COLON SURGERY       WI PART REMOVAL COLON W COLOSTOMY      Description: Partial Colectomy With Colostomy;  Recorded: 05/10/2008;       Social History     Socioeconomic History     Marital status:      Spouse name: Not on file     Number of children: Not on file     Years of education: Not on file     Highest education level: Not on file   Occupational History     Not on file   Social Needs     Financial resource strain: Not on file     Food insecurity     Worry: Not on file     Inability: Not on file     Transportation needs     Medical: Not on file     Non-medical: Not on file   Tobacco Use     Smoking status: Never Smoker     Smokeless tobacco: Never Used   Substance and Sexual Activity     Alcohol use: Yes     Alcohol/week: 1.0 standard drinks     Types: 1 Cans of beer per week     Comment: once a month     Drug use: No     Sexual activity: Yes   Lifestyle     Physical activity     Days per week: Not on file     Minutes per session: Not on file     Stress: Not on file   Relationships     Social connections     Talks on phone: Not on file     Gets together: Not on file     Attends Church service: Not on file     Active member of club or organization: Not on file     Attends meetings of clubs or organizations: Not on file     Relationship status: Not on file     Intimate partner violence     Fear of current or ex partner: Not on file     Emotionally abused: Not on file     Physically abused: Not on file     Forced sexual activity: Not on file   Other Topics Concern     Not on file   Social History Narrative     Not on file       Objective:     /78  (Patient Site: Right Arm, Patient Position: Sitting, Cuff Size: Adult Large)   Pulse 78   Temp 98.2  F (36.8  C) (Oral)   Wt 189 lb 8 oz (86 kg)   SpO2 97%   BMI 24.33 kg/m        Physical Exam:  General: Alert, in no distress  Skin: No significant lesion seen.  Eyes/nose/throat: Eyes without scleral icterus, eye movements normal, pupils equal and reactive, oropharynx clear  MSK: Neck with good ROM  Lymphatic: Neck without adenopathy or masses  Pulm: Lungs clear to auscultation bilaterally  Cardiac: Heart with regular rate and rhythm, no murmur or gallop  GI: Abdomen soft, nontender. + Colostomy left lower quadrant  MSK: Extremities no tenderness or edema  Neuro: Moves all extremities, without focal weakness  Psych: Alert, normal mental status. Normal affect and speech    Labs:  Recent Results (from the past 48 hour(s))   Electrocardiogram Perform and Read    Collection Time: 06/11/20 10:59 AM   Result Value Ref Range    SYSTOLIC BLOOD PRESSURE      DIASTOLIC BLOOD PRESSURE      VENTRICULAR RATE 76 BPM    ATRIAL RATE 76 BPM    P-R INTERVAL 182 ms    QRS DURATION 100 ms    Q-T INTERVAL 404 ms    QTC CALCULATION (BEZET) 454 ms    P Axis 71 degrees    R AXIS -50 degrees    T AXIS 53 degrees    MUSE DIAGNOSIS       Normal sinus rhythm with sinus arrhythmia  Left anterior fascicular block  Abnormal ECG  When compared with ECG of 16-DEC-2019 10:33,  No significant change was found     Basic Metabolic Panel    Collection Time: 06/11/20 11:28 AM   Result Value Ref Range    Sodium 139 136 - 145 mmol/L    Potassium 3.9 3.5 - 5.0 mmol/L    Chloride 104 98 - 107 mmol/L    CO2 24 22 - 31 mmol/L    Anion Gap, Calculation 11 5 - 18 mmol/L    Glucose 103 70 - 125 mg/dL    Calcium 8.7 8.5 - 10.5 mg/dL    BUN 8 8 - 22 mg/dL    Creatinine 1.13 0.70 - 1.30 mg/dL    GFR MDRD Af Amer >60 >60 mL/min/1.73m2    GFR MDRD Non Af Amer >60 >60 mL/min/1.73m2   HM1 (CBC with Diff)    Collection Time: 06/11/20 11:28 AM   Result Value Ref  Range    WBC 11.0 4.0 - 11.0 thou/uL    RBC 4.54 4.40 - 6.20 mill/uL    Hemoglobin 13.5 (L) 14.0 - 18.0 g/dL    Hematocrit 41.0 40.0 - 54.0 %    MCV 90 80 - 100 fL    MCH 29.7 27.0 - 34.0 pg    MCHC 32.9 32.0 - 36.0 g/dL    RDW 11.8 11.0 - 14.5 %    Platelets 341 140 - 440 thou/uL    MPV 7.4 7.0 - 10.0 fL    Neutrophils % 58 50 - 70 %    Lymphocytes % 31 20 - 40 %    Monocytes % 8 2 - 10 %    Eosinophils % 3 0 - 6 %    Basophils % 1 0 - 2 %    Neutrophils Absolute 6.3 2.0 - 7.7 thou/uL    Lymphocytes Absolute 3.5 0.8 - 4.4 thou/uL    Monocytes Absolute 0.9 0.0 - 0.9 thou/uL    Eosinophils Absolute 0.3 0.0 - 0.4 thou/uL    Basophils Absolute 0.1 0.0 - 0.2 thou/uL        Immunization History   Administered Date(s) Administered     DT (pediatric) 01/01/1990, 07/05/2000     Hep A, historic 06/03/2008, 12/30/2008     Hep B, historic 06/03/2008, 06/30/2008, 12/30/2008     Influenza S1c5-43, 12/12/2009     Influenza, Seasonal, Inj PF IIV3 10/04/2011     Influenza, inj, historic,unspecified 10/15/2017     Pneumo Polysac 23-V 06/03/2008, 07/05/2013     Pneumococcal Vaccine, Unspecified 05/30/2014, 04/30/2019     Td,adult,historic,unspecified 06/03/2008     Tdap 06/03/2008       Electronically signed by Kyle Castañeda MD 06/11/20 10:46 AM

## 2021-06-09 NOTE — PROGRESS NOTES
ASSESSMENT:   1. Influenza-like illness  azithromycin (ZITHROMAX Z-RANI) 250 MG tablet    albuterol (PROVENTIL HFA;VENTOLIN HFA) 90 mcg/actuation inhaler   2. Wheezing  albuterol (PROVENTIL HFA;VENTOLIN HFA) 90 mcg/actuation inhaler     This seems influenza like, no concerning s/s for bacterial etiology at this time. However, given his immunosuppression, I am more concerned that an abx may be needed for a lung infection if symptoms not clearing over the next several days.    PLAN:  Influenza like illness  Take tylenol as needed for pain   Given immunosuppression, if symptoms are not clearing after 3 - 4 days, then you may start the antibiotic that has been prescribed.   Push fluids, get extra rest  Recommend hot tea with lemon/honey, lozenges, chloraseptic spray or salt water gargles to soothe your throat  Recommend hot steamy showers, saline nasal spray or a netti pot to relieve congestion  May use a cough suppressant or a cool mist humidifier to lessen cough  Return to clinic if symptoms are not improving as expected or if worsening in any way.       SUBJECTIVE:   Elvin Castro is a 61 y.o. male presents today with cold symptoms for 5 days and is unchanged. He does have Crohns and is on remicade and methotrexate, causing him to be immunosuppressed. He has had sore throat, myalgia, chills, sore throat, productive cough, wheezing, nasal congestion but denies chills. Sick contacts: unknown. Has tried robitussin with relief. Nonsmoker. No hx of asthma.       No past medical history on file.    History   Smoking Status     Never Smoker   Smokeless Tobacco     Never Used       Current Medications:  Current Outpatient Prescriptions   Medication Sig Dispense Refill     esomeprazole (NEXIUM) 40 MG capsule Take 40 mg by mouth every morning before breakfast.       FOLIC ACID ORAL Take by mouth.       INFLIXIMAB (REMICADE IV) Infuse into a venous catheter.       albuterol (PROVENTIL HFA;VENTOLIN HFA) 90 mcg/actuation inhaler  Inhale 2 puffs every 4 (four) hours as needed for wheezing. 1 Inhaler 0     azithromycin (ZITHROMAX Z-RANI) 250 MG tablet Take 2 tablets (500 mg) on  Day 1,  followed by 1 tablet (250 mg) once daily on Days 2 through 5. 6 tablet 0     methotrexate (PF) 25 mg/mL Soln 25 mg once.       No current facility-administered medications for this visit.        Allergies:   No Known Allergies    OBJECTIVE:   Vitals:    03/21/17 1509   BP: 128/60   Pulse: 80   Resp: 18   Temp: 98.4  F (36.9  C)   TempSrc: Oral   SpO2: 98%   Weight: 176 lb (79.8 kg)     Physical exam reveals a pleasant 61 y.o. male.   Appears alert and cooperative.  Eyes:  ROSALVA, EOMI  Ears:  normal TMs bilaterally and normal canals bilaterally  Nose:    Mucosa normal. Scant, clear rhinorrhea.septum midline, normal mucosa and clear rhinorrhea  Mouth:  Mucosa pink and moist.  mild erythema   Neck: few small anterior cervical nodes  Sinuses: nontender with palpation  Lungs: Chest is clear, no wheezing or rales. Symmetric air entry throughout both lung fields. Occasional congested cough  Heart: regular rate and rhythm, no murmur, rub or gallop

## 2021-06-09 NOTE — PATIENT INSTRUCTIONS - HE
Bilateral leg, ankle, and foot edema, right a little more than left, dry throat, and blurry vision, and fogginess in the head--I suspect that these are some sort of idiosyncratic side effect from the combination of ciprofloxacin and metronidazole antibiotic that he started on June 5 because of a perianal abscess which was explored on Monday, More[place] 15 (3 days ago), and for which he needs to have subsequent MRI pelvis imaging, and probably surgical reexploration, and for which he plans to continue on the antibiotics through Friday, June 26.  He is supposed to get his next Remicade infusion this coming Friday, June 19.    He told me that even before starting the antibiotic, he did notice just a twinge of tightness in his calves and a bit of ankle swelling.  These definitely became more prominent after he started the antibiotics on June 5.  These were mild, and did not come up during our conversation for his preop examination with me on June 11.    On June 11, he had satisfactory metabolic panel and CBC except for mild degree of anemia with hemoglobin 13.5.    Today, let us recheck his CBC, comprehensive metabolic panel, and also let us get a urinalysis to look for proteinuria.    I am less suspicious for blood clot, since both of his legs are affected (right a bit more than left), and also given the chronicity of his symptoms, with a gradual onset over the last week and a half that started after he began antibiotics.  I do not think a d-dimer blood test would help us, since it is likely to be tripped positive because of his recent surgery.  I told him to let me know if one leg gets much worse than the other, because then I might pursue getting an ultrasound to look for blood clots.    He needs to be on antibiotics because of the abscess.  Let me see what his testing shows, and I will advise him as far as any antibiotic change.    The wound on his buttock is dressed.  The plan is for him to have an MRI of the pelvis on  July 5 or 6, follow-up with a surgeon, and likely have surgical reexploration.    I did ask him to try to keep his legs elevated as much as possible when is not up and about.  Reduce the amount of dietary sodium.  But do not get dehydrated.    Remicade for his Crohn's disease, under the supervision of Dr. Kvng Baum at [facility], has been on Remicade for about 15 years.  No longer on methotrexate.  He is scheduled to have Remicade infusion this Friday, June 19.  I will have today's blood test results back by tomorrow morning.  I would like him to asked Dr. Baum to confirm whether the Remicade should be given.     Colostomy left lower quadrant.  Approximately 12 cm of rectosigmoid remnant.     Osteopenia demonstrated on DEXA bone density scan from 2015, with a 10% decline over 1 decade.  I have gone ahead and ordered a updated DEXA bone density scan for him.     Vitamin B12 deficiency, on high-dose oral replacement     Left anterior hemiblock on EKG, stable, asymptomatic    Colostomy left lower quadrant.  Approximately 12 cm of rectosigmoid remnant.

## 2021-06-10 NOTE — PROGRESS NOTES
Assessment/Plan:        Diagnoses and all orders for this visit:    CAP (community acquired pneumonia)  -     HM1(CBC and Differential)  -     HM1 (CBC with Diff)    Streptococcal bacteremia  -     HM1(CBC and Differential)  -     HM1 (CBC with Diff)    Edema  -     US Venous Legs Bilateral; Future; Expected date: 5/11/17        Continue with the levofloxacin.  Fluid hydration, precautionary measures.  Recheck in 6 weeks and consider repeating chest x-ray at that time.  Also do a CBC with differential count.  Forms are also filled for temporary disability and for his [employer] life insurance.  For his edema, concerns with the change.  Although bilateral.  Will rule out DVT and assisted with scheduling today.  Await results prior to further recommendations.  He was advised that should results come back positive, will need to go to ER.  Compression stockings.  Low-salt diet.  If persistent despite negative results, consider echocardiogram.  He was agreeable with the plans.  Subjective:    Patient ID: Elvin Castro is a 61 y.o. male.    JACOBY Oconnor is here for follow-up from his recent hospitalization. Was admitted on May 4-6.  He felt like he had the flu.  Was achy all over including behind his eyes, joints.  Had episodes of vomiting and felt warm, sweating and even cold.  No appetite and limited fluid intake.  He also had episodes of coughing.  He is also noticing rib pain from his coughing and mostly in the right side.  Episodes where he noticed blood when blowing his nose.  Does not recall of being exposed to anyone sick.  His mom is in assisted living and had influenza outbreak.  His daughter teaches .  No recent travel.  He went to ER on May 4.  Receive fluid hydration.  Found to have pneumonia and eventually had positive blood culture, strep bacteremia.  He is now on levofloxacin.  He is doing better since after discharge.  Denies any recurrence of fever.  He was taking Advil, Tylenol as needed for his  rib pain.  Coughing or deep breathing, sneezing would cause rib pain.  Coughing is less including headache.  He denies any shortness of breath at this time.  No hemoptysis.  Has been able to hydrate himself well.  Trying to limit exposure to her daughter who is recently ill.    He notices his ankles, health to be swollen.  He was not sure when this started but likely while he was in the hospital.  Denies any fall, injury.  No history of blood clots before.  His father did have a blood clot in his 80s but likely because of being sedentary.  Works as an  and up and down ladders frequently.  He was given an off work note from the hospital up to 13 May.  Will probably be given a lighter workload when he goes back.  Needs temporary disability form filled including form from his life insurance.    He also has Crohn's disease and follows with Dr. Baum.  Just had a recent visit with them.  They will plan on starting Remicade next week.  He has labs with them almost every 6 weeks.  He mentions that he is always cold but wonders if this was related to decrease body fat or low blood pressure.  Symptoms have been stable.  He had his thyroid checked with them before and was normal.    Review of Systems  As above otherwise negative.          Objective:    Physical Exam  /70 (Patient Site: Left Arm, Patient Position: Sitting, Cuff Size: Adult Large)  Pulse 95  Wt 177 lb 14.4 oz (80.7 kg)  SpO2 98%  BMI 22.84 kg/m2    Vital signs noted above. AAO ×3.  HEENT no nasal discharge, moist oral mucosa. Ears:TMs intact, no fluid collection. Neck: Supple neck, nonpalpable cervical lymph nodes.  Lungs: Clear to auscultation bilateral.  Heart: S1-S2 regular rate and rhythm, no murmurs were noted.  Abdomen: Flat, soft with bowel sounds and nontender.  Extremities: Bipedal edema with the right more than the left, pulses were full and equal.  Ankle swelling also noted.  No pain with range of motion.  No erythema or  warmth.

## 2021-06-10 NOTE — PRE-PROCEDURE
Procedure Name: CT guided pelvic abscess drain with moderate sedation  Date/Time: 7/28/2020 7:59 AM  Written consent obtained?: Yes  Risks and benefits: Risks, benefits and alternatives were discussed  Consent given by: patient  Expected level of sedation: moderate  ASA Class: Class 2- mild systemic disease, no acute problems, no functional limitations  Mallampati: Grade 2- soft palate, base of uvula, tonsillar pillars, and portion of posterior pharyngeal wall visible  Patient states understanding of procedure being performed: Yes  Patient's understanding of procedure matches consent: Yes  Procedure consent matches procedure scheduled: Yes  Appropriately NPO: yes  Lungs: lungs clear with good breath sounds bilaterally  Heart: normal heart sounds and rate  History & Physical reviewed: History and physical reviewed and no updates needed  Statement of review: I have reviewed the lab findings, diagnostic data, medications, and the plan for sedation

## 2021-06-10 NOTE — TELEPHONE ENCOUNTER
Spoke to the patient who stated that he has been going through a series of procedures since his pre-op with Dr. Castañeda in June. His next procedure at [facility] will be on 8/28. He stated that he was not told he needed a hospital visit follow up but rather would like to know more about the meaning of the nodular finding in his right lobe from the CT abdomen pelvis resulted on 8/4/20. A video visit was scheduled with Dr. Castañeda for tomorrow, 8/18/20, to talk about this finding.

## 2021-06-10 NOTE — PATIENT INSTRUCTIONS - HE
Surely benign 5 mm right lung nodule on CT scan abdomen and pelvis performed on August 4, 2020--probably scar tissue from right-sided pneumonia that he experienced in 2017    He told me that he had severe pneumonia in May 2017, and I found chest x-ray reports from May 2017 and April 2018 describing infiltrate in the right lung base.  He is a never smoker.    I told him that the nodule seen on CT scan very possibly is a left over effect of the pneumonia, probably a bit of scar tissue, and is nothing to worry about.    Perianal abscess and fistula related to Crohn's disease  He told me that he has another CT and abscessogram coming up in about 2 weeks, to follow-up on most recent imaging of August 12, 2020 which showed that the abscess cavity is nearly resolved.  He told me that that drainage from the catheter is now minimal.    He continues to get Remicade infusions at [facility], last one about a week ago.    Vaccinations  I reminded him to get his influenza vaccine as soon as it is available in early September.  He can also get subsequent tetanus and shingles vaccine 2 shot series but I advised that he do his vaccines one at a time in case he experiences a side effect.       Colostomy left lower quadrant.  Approximately 12 cm of rectosigmoid remnant.     Osteopenia demonstrated on DEXA bone density scan from 2015, with a 10% decline over 1 decade.  I have gone ahead and ordered a updated DEXA bone density scan for him.     Vitamin B12 deficiency, on high-dose oral replacement     Left anterior hemiblock on EKG, stable, asymptomatic

## 2021-06-10 NOTE — PROCEDURES
[facility] [place]    Procedure: IR Procedure Note    Date/Time: 8/26/2020 10:28 AM  Performed by: Garry Lam  Authorized by: Garry Lam       Universal Protocol    Site marked: Yes    Prior images obtained and reviewed: Yes    Required items: required blood products, implants, devices, and special equipment available    Patient identity confirmed: verbally with patient    Reevaluation: Patient was reevaluated immediately before administering moderate or deep sedation or anesthesia    Confirmation checklist: patient's identity using two indicators, relevant allergies, procedure was appropriate and matched the consent or emergent situation and correct equipment/implants were available    Time out: Immediately prior to procedure a time out was called to verify the correct patient, procedure, equipment, support staff and site/side marked as required    Universal Protocol: Joint Commission Universal Protocol was followed    Preparation: Patient was prepped and draped in the usual sterile fashion    Anesthesia    Local anesthesia used?: Yes    Anesthesia: see MAR for details    Sedation    Patient sedation: Yes    Vital signs: Vital signs monitored during sedation  Specimens: none  Complications: None  Condition: Stable    Post-procedure    Patient tolerance: Patient tolerated the procedure well with no immediate complications   Length of time physician present for 1:1 monitoring during sedation: 25

## 2021-06-10 NOTE — PROCEDURES
[facility]    Procedure: IR Procedure Note    Date/Time: 8/12/2020 10:53 AM  Performed by: David Tena MD  Authorized by: David Tena MD       Universal Protocol    Site marked: NA    Prior images obtained and reviewed: Yes    Required items: required blood products, implants, devices, and special equipment available    Patient identity confirmed: verbally with patient, arm band, provided demographic data and hospital-assigned identification number    Reevaluation: Patient was reevaluated immediately before administering moderate or deep sedation or anesthesia    Confirmation checklist: patient's identity using two indicators, relevant allergies, procedure was appropriate and matched the consent or emergent situation and correct equipment/implants were available    Time out: Immediately prior to procedure a time out was called to verify the correct patient, procedure, equipment, support staff and site/side marked as required    Universal Protocol: Joint Commission Universal Protocol was followed    Preparation: Patient was prepped and draped in the usual sterile fashion    Anesthesia  Local anesthesia used?: No    Sedation  Patient sedation: No  Specimens: none  Complications: None  Condition: Stable    Post-procedure   Length of time physician present for 1:1 monitoring during sedation: 0

## 2021-06-10 NOTE — H&P
H&P documented within 30 days (7/14/2020 by Dr. Baum). I have performed and assessment and examined the patient, as necessary, to update the patient's current status that may have changed since the prior History and Physical.

## 2021-06-10 NOTE — TELEPHONE ENCOUNTER
It appears patient has recently been in the hospital and has questions.  Please schedule a hospital follow up visit with available provider this week.

## 2021-06-10 NOTE — PRE-PROCEDURE
Procedure Name: abscessogram with possible removal/reposition/exchange with sedation as needed  Date/Time: 8/4/2020 1:32 PM  Written consent obtained?: Yes  Risks and benefits: Risks, benefits and alternatives were discussed  Consent given by: patient  Expected level of sedation: moderate  ASA Class: Class 2- mild systemic disease, no acute problems, no functional limitations  Mallampati: Grade 2- soft palate, base of uvula, tonsillar pillars, and portion of posterior pharyngeal wall visible  Patient states understanding of procedure being performed: Yes  Patient's understanding of procedure matches consent: Yes  Procedure consent matches procedure scheduled: Yes  Appropriately NPO: yes  Lungs: lungs clear with good breath sounds bilaterally  Heart: normal heart sounds and rate  History & Physical reviewed: History and physical reviewed and no updates needed  Statement of review: I have reviewed the lab findings, diagnostic data, medications, and the plan for sedation

## 2021-06-10 NOTE — PROCEDURES
[facility]    Procedure: IR Procedure Note    Date/Time: 8/4/2020 2:36 PM  Performed by: Da Larkin MD  Authorized by: Da Larkin MD       Universal Protocol    Site marked: Yes    Prior images obtained and reviewed: Yes    Required items: required blood products, implants, devices, and special equipment available    Patient identity confirmed: verbally with patient    Reevaluation: Patient was reevaluated immediately before administering moderate or deep sedation or anesthesia    Confirmation checklist: patient's identity using two indicators, relevant allergies, procedure was appropriate and matched the consent or emergent situation and correct equipment/implants were available    Time out: Immediately prior to procedure a time out was called to verify the correct patient, procedure, equipment, support staff and site/side marked as required    Universal Protocol: Joint Commission Universal Protocol was followed    Preparation: Patient was prepped and draped in the usual sterile fashion    Anesthesia    Local anesthesia used?: Yes    Sedation    Patient sedation: Yes    Vital signs: Vital signs monitored during sedation  Specimens: none  Complications: None  Condition: Stable    Post-procedure    Patient tolerance: Patient tolerated the procedure well with no immediate complications   Length of time physician present for 1:1 monitoring during sedation: 15

## 2021-06-10 NOTE — PROCEDURES
[facility]    Procedure: CT guided presacral collection drain placement with moderate sedation    Date/Time: 7/28/2020 9:56 AM  Performed by: Edgardo Osman MD  Authorized by: Edgardo Osman MD       Universal Protocol    Site marked: Yes    Prior images obtained and reviewed: Yes    Required items: required blood products, implants, devices, and special equipment available    Patient identity confirmed: verbally with patient    Reevaluation: Patient was reevaluated immediately before administering moderate or deep sedation or anesthesia    Confirmation checklist: patient's identity using two indicators, relevant allergies, procedure was appropriate and matched the consent or emergent situation and correct equipment/implants were available    Time out: Immediately prior to procedure a time out was called to verify the correct patient, procedure, equipment, support staff and site/side marked as required    Universal Protocol: Joint Commission Universal Protocol was followed    Preparation: Patient was prepped and draped in the usual sterile fashion    ESBL (mL): 0    Anesthesia    Local anesthesia used?: Yes    Local anesthetic: lidocaine 1% without epinephrine    Anesthetic total (mL): 10    Sedation    Patient sedation: Yes    Sedation: fentanyl and midazolam    Vital signs: Vital signs monitored during sedation    Post-procedure    Description of procedure: EXAM:  1. PERCUTANEOUS DRAIN PLACEMENT PRESACRAL COLLECTION  2. CT GUIDANCE  3. CONSCIOUS SEDATION    LOCATION: [facility]  DATE: 7/28/2020     INDICATION: PERCUTANEOUS DRAINAGE OF PRESACRAL ABSCESS.  TECHNIQUE: Dose reduction techniques were used.    PROCEDURE: Informed consent obtained. Site marked. Prior images reviewed. Required items made available. Patient identity confirmed verbally and with arm band. Patient reevaluated immediately before administering sedation. Universal protocol was followed. Time out performed. The site  was prepped and draped in sterile fashion. 10 mL of 1% lidocaine was infused into the local soft tissues. Using standard technique and under direct CT guidance, a 10 [place] drainage catheter was inserted into the fluid collection. Of note, the final portions of both inserting guide and drainage catheters into the collection met resistance at a thickened rim of fibrous tissue surrounding the collection. This made insertion of a larger caliber drainage tube difficult (not attempted), though a 10F drain was able to be placed partially into the collection.     SPECIMEN: 3 mL of thickened fluid was initially aspirated and sent to lab for cultures and Gram stain.    BLOOD LOSS: Minimal.    The patient tolerated the procedure well. No immediate complications.    RADIOLOGIC SUPERVISION AND INTERPRETATION:   CT GUIDANCE: Images demonstrate the needle and subsequent catheter to be in good position.    SEDATION: Versed 2.5 mg. Fentanyl 150 mcg. The procedure was performed with administration intravenous conscious sedation with appropriate preoperative, intraoperative, and postoperative evaluation.    43 minutes of supervised face to face conscious sedation time was provided by a radiology nurse under my direct supervision.    IMPRESSION:    Successful CT-guided drain placement into pelvic presacral collection.    Of note, a thickened rind of surrounding fibrous tissue was encountered during the insertion of the drain into the collection. Please see above.      Patient tolerance: Patient tolerated the procedure well with no immediate complications   Length of time physician present for 1:1 monitoring during sedation: 45

## 2021-06-10 NOTE — PRE-PROCEDURE
Procedure Name: abscessogram   Date/Time: 8/26/2020 8:24 AM  Written consent obtained?: Yes  Risks and benefits: Risks, benefits and alternatives were discussed  Consent given by: patient  Expected level of sedation: moderate  ASA Class: Class 3- Severe systemic disease, definite functional limitations  Mallampati: Grade 1- soft palate, uvula, tonsillar pillars, and posterior pharyngeal wall visible  Patient states understanding of procedure being performed: Yes  Patient's understanding of procedure matches consent: Yes  Procedure consent matches procedure scheduled: Yes  Appropriately NPO: yes  Lungs: lungs clear with good breath sounds bilaterally  Heart: normal heart sounds and rate  History & Physical reviewed: Abbreviated history and physical done prior to moderate sedation  Statement of review: I have reviewed the lab findings, diagnostic data, medications, and the plan for sedation

## 2021-06-10 NOTE — PRE-PROCEDURE
Procedure Name: Abscessogram  Date/Time: 8/4/2020 1:53 PM    Verbal consent obtained?: Yes  Written consent obtained?: Yes  Risks and benefits: Risks, benefits and alternatives were discussed  Discharge plan: Appropriate discharge home plan in place for patients who are going home after procedure   Consent given by: patient  Expected level of sedation: moderate  ASA Class: Class 2- mild systemic disease, no acute problems, no functional limitations  Mallampati: Grade 2- soft palate, base of uvula, tonsillar pillars, and portion of posterior pharyngeal wall visible  Patient states understanding of procedure being performed: Yes  Patient's understanding of procedure matches consent: Yes  Procedure consent matches procedure scheduled: Yes  Appropriately NPO: yes  Lungs: lungs clear with good breath sounds bilaterally  Heart: normal heart sounds and rate  History & Physical reviewed: History and physical reviewed and no updates needed  Statement of review: I have reviewed the lab findings, diagnostic data, medications, and the plan for sedation

## 2021-06-10 NOTE — PRE-PROCEDURE
Procedure Name: abscessogram  Date/Time: 8/12/2020 9:45 AM  Written consent obtained?: Yes  Risks and benefits: Risks, benefits and alternatives were discussed  Consent given by: patient  Expected level of sedation: moderate  ASA Class: Class 3- Severe systemic disease, definite functional limitations  Mallampati: Grade 3- soft palate visible, posterior pharyngeal wall not visible  Patient states understanding of procedure being performed: Yes  Patient's understanding of procedure matches consent: Yes  Procedure consent matches procedure scheduled: Yes  Appropriately NPO: yes  Lungs: lungs clear with good breath sounds bilaterally  Heart: normal heart sounds and rate  History & Physical reviewed: History and physical reviewed and no updates needed  Statement of review: I have reviewed the lab findings, diagnostic data, medications, and the plan for sedation      
Chest pain

## 2021-06-10 NOTE — PROGRESS NOTES
"Elvin Castro is a 64 y.o. male who is being evaluated via a billable video visit.      The patient has been notified of following:     \"This video visit will be conducted via a call between you and your physician/provider. We have found that certain health care needs can be provided without the need for an in-person physical exam.  This service lets us provide the care you need with a video conversation.  If a prescription is necessary we can send it directly to your pharmacy.  If lab work is needed we can place an order for that and you can then stop by our lab to have the test done at a later time.    Video visits are billed at different rates depending on your insurance coverage. Please reach out to your insurance provider with any questions.    If during the course of the call the physician/provider feels a video visit is not appropriate, you will not be charged for this service.\"    Patient has given verbal consent to a Video visit? Yes  How would you like to obtain your AVS? AVS Preference: Morgan Everetthart.  If dropped by the video visit, the video invitation should be sent to: Text to cell phone: [phone]  Will anyone else be joining your video visit? No        Video Start Time: 8:25 AM    Video visit because patient was advised to follow-up with PCP regarding finding of a 5 mm right lung nodule on CT scan abdomen and pelvis performed on August 4, 2020.    He told me that he had severe pneumonia in May 2017, and I found chest x-ray reports from May 2017 and April 2018 describing infiltrate in the right lung base.  He is a never smoker.    I told him that the nodule seen on CT scan very possibly is a left over effect of the pneumonia, probably a bit of scar tissue, and is nothing to worry about.    He told me that he has another CT and abscessogram coming up in about 2 weeks, to follow-up on most recent imaging of August 12, 2020 which showed that the abscess cavity is nearly resolved.  He told me that that " drainage from the catheter is now minimal.    He continues to get Remicade infusions at [facility], last one about a week ago.    I reminded him to get his influenza vaccine as soon as it is available in early September.  He can also get subsequent tetanus and shingles vaccine 2 shot series but I advised that he do his vaccines one at a time in case he experiences a side effect.    During the video interview, he appeared well, breathing comfortably.  He denied experiencing any respiratory symptoms.      Wt Readings from Last 3 Encounters:   08/04/20 190 lb (86.2 kg)   07/28/20 190 lb (86.2 kg)   06/23/20 189 lb (85.7 kg)     BP Readings from Last 3 Encounters:   08/12/20 145/84   08/04/20 117/66   07/28/20 101/57       Lab Results   Component Value Date    WBC 11.5 (H) 06/18/2020    HGB 14.5 07/28/2020    HCT 39.6 (L) 06/18/2020     07/28/2020    HDL 48 07/10/2019    LDLDIRECT 59 07/10/2019    ALT 25 08/03/2020    AST 22 08/03/2020     06/18/2020    K 3.8 06/18/2020     06/18/2020    CREATININE 1.21 08/03/2020    BUN 12 06/18/2020    CO2 26 06/18/2020    TSH 3.50 12/16/2019    PSA 0.3 11/06/2018    INR 1.03 07/28/2020    HGBA1C 5.1 11/06/2018        Medications, Allergies, Social, and Problem List   Current Outpatient Medications   Medication Sig Dispense Refill     ascorbic acid, vitamin C, (VITAMIN C) 1000 MG tablet Take 1,000 mg by mouth daily.       calcium citrate (CALCITRATE) 200 mg (950 mg) tablet Take 2 tablets by mouth 2 (two) times a day.       cholecalciferol, vitamin D3, 1,000 unit tablet Take 2,000 Units by mouth daily.        cyanocobalamin, vitamin B-12, 2,500 mcg Tab Take 1,250 mcg by mouth daily.       esomeprazole (NEXIUM) 20 MG capsule Take 2 capsules by mouth.       inFLIXimab (REMICADE) 100 mg injection Administer Remicade 10mg/kg every six weeks, infuse by IV route       No current facility-administered medications for this visit.      Allergies   Allergen Reactions      Mercaptopurine Myalgia     Extreme muscle weakness, required admission to hospital.      Social History     Tobacco Use     Smoking status: Never Smoker     Smokeless tobacco: Never Used   Substance Use Topics     Alcohol use: Yes     Alcohol/week: 1.0 standard drinks     Types: 1 Cans of beer per week     Comment: once a month     Drug use: No     Patient Active Problem List   Diagnosis     Immunodeficiency (H)     Crohn's disease of both small and large intestine with intestinal obstruction (H)     Vitamin B12 deficiency (non anemic)     Dermatitis     Primary osteoarthritis of both hands     LAFB (left anterior fascicular block)     Osteopenia of multiple sites     Abscess of buttock, right     Bilateral leg edema        Reviewed, reconciled and updated       ASSESSMENT AND PLAN    Surely benign 5 mm right lung nodule on CT scan abdomen and pelvis performed on August 4, 2020--probably scar tissue from right-sided pneumonia that he experienced in 2017    He told me that he had severe pneumonia in May 2017, and I found chest x-ray reports from May 2017 and April 2018 describing infiltrate in the right lung base.  He is a never smoker.    I told him that the nodule seen on CT scan very possibly is a left over effect of the pneumonia, probably a bit of scar tissue, and is nothing to worry about.    Perianal abscess and fistula related to Crohn's disease  He told me that he has another CT and abscessogram coming up in about 2 weeks, to follow-up on most recent imaging of August 12, 2020 which showed that the abscess cavity is nearly resolved.  He told me that that drainage from the catheter is now minimal.    He continues to get Remicade infusions at [facility], last one about a week ago.    Vaccinations  I reminded him to get his influenza vaccine as soon as it is available in early September.  He can also get subsequent tetanus and shingles vaccine 2 shot series but I advised that he do his vaccines one at a  time in case he experiences a side effect.       Colostomy left lower quadrant.  Approximately 12 cm of rectosigmoid remnant.     Osteopenia demonstrated on DEXA bone density scan from 2015, with a 10% decline over 1 decade.  I have gone ahead and ordered a updated DEXA bone density scan for him.     Vitamin B12 deficiency, on high-dose oral replacement     Left anterior hemiblock on EKG, stable, asymptomatic    Wt Readings from Last 3 Encounters:   08/04/20 190 lb (86.2 kg)   07/28/20 190 lb (86.2 kg)   06/23/20 189 lb (85.7 kg)     Immunization History   Administered Date(s) Administered     DT (pediatric) 01/01/1990, 07/05/2000     Hep A, historic 06/03/2008, 12/30/2008     Hep B, historic 06/03/2008, 06/30/2008, 12/30/2008     Influenza S0p8-15, 12/12/2009     Influenza, Seasonal, Inj PF IIV3 10/04/2011     Influenza, inj, historic,unspecified 10/15/2017     Pneumo Polysac 23-V 06/03/2008, 07/05/2013     Pneumococcal Vaccine, Unspecified 05/30/2014, 04/30/2019     Td,adult,historic,unspecified 06/03/2008     Tdap 06/03/2008        Video-Visit Details    Type of service:  Video Visit    Video End Time (time video stopped): 8:39 AM  Originating Location (pt. Location): Home    Distant Location (provider location):  [facility] INTERNAL MEDICINE     Platform used for Video Visit: Charli Castañeda MD

## 2021-06-10 NOTE — PROGRESS NOTES
Assessment/Plan:        Diagnoses and all orders for this visit:    Sore throat  -     Rapid Strep A Screen-Throat  -     Group A Strep, RNA Direct Detection, Throat    Cough        Rapid strep was done which came back negative.  Will send that for culture.  Precautionary measures, fluid hydration.  OTC expectorant medication.  Chloraseptic spray.  Update us in a week if persistent or worse.  He was agreeable with the plans.  Subjective:    Patient ID: Elvin Castro is a 61 y.o. male.    HPI    Elvin is here with concerns about cough.  Recently hospitalized for flulike symptoms, pneumonia.  Just finished Levaquin around 1-1/2 weeks ago.  Started having sore throat 3 days ago.  Daughter teaches kids and has several strep cases in school.  Wife has also been coughing.  He also notes postnasal drainage, cough with clear secretions.  Shortness of breath mostly with exertion but a bit better compared to before.  Had some minimal sweating last night.  Denies any hemoptysis.  No recent travel. Limited fluid intake.  Also tried Chloraseptic spray.  No history of smoking, chronic lung disease.  He did receive his Remicade infusion mid-May.  Today is his last day at work, retired . Last project was at [employer].     Review of Systems  As above otherwise negative.          Objective:    Physical Exam  /70 (Patient Site: Left Arm, Patient Position: Sitting, Cuff Size: Adult Regular)  Pulse 80  Temp 98.9  F (37.2  C) (Oral)   Wt 168 lb 14.4 oz (76.6 kg)  SpO2 97%  BMI 21.69 kg/m2    Vital signs noted above. AAO ×3.  HEENT no nasal discharge, moist oral mucosa. Ears:TMs intact, no fluid collection. Neck: Supple neck, nonpalpable cervical lymph nodes.  Lungs: Clear to auscultation bilateral.  Heart: S1-S2 regular rate and rhythm, no murmurs were noted.  Abdomen:  with bowel sounds.  Extremities pulses wer:e full and equal.

## 2021-06-13 NOTE — PROGRESS NOTES
Assessment/Plan:        Diagnoses and all orders for this visit:    H/O: pneumonia    Tinnitus    Hearing loss        We will hold off on imaging at this time.  We discussed his imaging results again from June 2017.  We discussed indications for reevaluation.  For his ringing and hearing loss, offered audiology evaluation.  He was seen by [facility] before.  He will check his insurance to see if it is still within his network and he plans to revisit with them.  He will let us know if he needs a referral order in place.  We also reviewed his results from his [employer] screening in May.  Limit sweets, carbohydrates.  He was recovering from pneumonia at that time and would have put the stress to his heart causing NT-BNP to be slightly up.  He denies any symptoms at this time and no cardiac issues in the past.  Will closely monitor.  He was agreeable with the plans.    25 minutes spent with more than 50% in counseling discussion of treatment plans.  Subjective:    Patient ID: Elvin Castro is a 62 y.o. male.    JACOBY Oconnor is here for follow-up regarding his pneumonia. He was hospitalized in May for pneumonia.  He had a repeat chest x-ray after 6 weeks of treatment.  X-ray showed resolution of the infiltrate.  Small nodular density on his left fifth anterior rib which was unchanged.  He denies any fever, cough, hemoptysis, colds at this time.  No shortness of breath.  Does not feel winded normal day-to-day activities.  He denies any recent unexplained weight loss.  His weight has been steady now.  Appetite has been good.  He has been retired for the past 5 months but has been quite busy with projects at home, travel.  Taking care of his father.  Was in [place] recently as his father-in-law is not doing well from lung cancer, currently in hospice.  May visit him again shortly in the next couple of weeks.    Has been noticing ringing in his ears with the left more than the right.  Seems to be more in the past 1  month.  Hearing is also less.  Difficulty with a lot of background noise or female voice.  Denies any surgery or trauma to his ears before.  He attended a concert then a [employer] game early September.  Was very loud.    Brings results from [employer] screening done May 30, 2017.  Had questions about his NT-BNP which was slightly up.  His glucose was at 116, triglycerides 164.  He thinks he had soda and a small amount of fluid prior to the blood draw.  Denies any shortness of breath, chest pains, edema.  Denies any cardiac issues in the past.  Likes his soda.  Not much of other sweets.  Mom with diabetes but possibly with her lifestyle.  Rest of the family members does not have diabetes.    Review of Systems  As above otherwise negative.          Objective:    Physical Exam  /60 (Patient Site: Left Arm, Patient Position: Sitting, Cuff Size: Adult Regular)  Pulse 83  Wt 180 lb 4.8 oz (81.8 kg)  SpO2 99%  BMI 23.15 kg/m2    Vital signs noted above. AAO ×3.  HEENT no nasal discharge, moist oral mucosa. Ears:TMs intact, no fluid collection. Neck: Supple neck, nonpalpable cervical lymph nodes.  Lungs: Clear to auscultation bilateral.  Heart: S1-S2 regular rate and rhythm, no murmurs were noted.  Abdomen:  with bowel sounds.  Extremities: No edema, pulses were full and equal.

## 2021-06-16 PROBLEM — L30.9 DERMATITIS: Status: ACTIVE | Noted: 2018-11-06

## 2021-06-16 PROBLEM — L02.31 ABSCESS OF BUTTOCK, RIGHT: Status: ACTIVE | Noted: 2020-06-18

## 2021-06-16 PROBLEM — E53.8 VITAMIN B12 DEFICIENCY (NON ANEMIC): Status: ACTIVE | Noted: 2018-11-06

## 2021-06-16 PROBLEM — I44.4 LAFB (LEFT ANTERIOR FASCICULAR BLOCK): Status: ACTIVE | Noted: 2019-12-16

## 2021-06-16 PROBLEM — M19.041 PRIMARY OSTEOARTHRITIS OF BOTH HANDS: Status: ACTIVE | Noted: 2018-11-06

## 2021-06-16 PROBLEM — K50.812 CROHN'S DISEASE OF BOTH SMALL AND LARGE INTESTINE WITH INTESTINAL OBSTRUCTION (H): Status: ACTIVE | Noted: 2018-11-06

## 2021-06-16 PROBLEM — M85.89 OSTEOPENIA OF MULTIPLE SITES: Status: ACTIVE | Noted: 2020-06-11

## 2021-06-16 PROBLEM — M19.042 PRIMARY OSTEOARTHRITIS OF BOTH HANDS: Status: ACTIVE | Noted: 2018-11-06

## 2021-06-16 PROBLEM — R60.0 BILATERAL LEG EDEMA: Status: ACTIVE | Noted: 2020-06-18

## 2021-06-17 NOTE — PROGRESS NOTES
Assessment/Plan:        Diagnoses and all orders for this visit:    Cold feeling  -     Thyroid Stimulating Hormone (TSH)  -     JIC LAV    Cough        For his cough, likely viral.  Fluid hydration.  Continue with Mucinex, Cepacol lozenges.  Recheck in a week if persistent or worse.  For cold intolerance, we discussed differentials.  We will add TSH.  If normal, closely monitor.  Will hold off on doing CBC as he just had one recently.  He was agreeable with the plans.  Subjective:    Patient ID: Elvin Castro is a 62 y.o. male.    HPI    Behzad is here with concerns about cough, sore throat which was noted around 2 days ago.  He has thick yellowish secretions and was worse yesterday.  Noted burning in his chest this morning, headache, wheezing.  Was exposed to her daughter had a cold and sore throat.  Denies any recent travel.  No hemoptysis, shortness of breath.  He noted leg cramping last night and was trying to drink more Gatorade.  Not as good with fluid hydration.  He tried Mucinex, Tylenol, Cepacol lozenges, cough drops.  He was doing some painting recently, latex.  No other chemical exposure or increased dust or molds.  No chronic lung disease.      Has been feeling more cold especially this past winter season.  Had to have more layers.  No illness with it.  Denies any gross bleeding.  He has Crohn's and follows with GI regularly.  He had his labs done in March and normal blood count.    Review of Systems  As above otherwise negative.          Objective:    Physical Exam  /60 (Patient Site: Left Arm, Patient Position: Sitting, Cuff Size: Adult Large)  Pulse 79  Temp 98.3  F (36.8  C) (Oral)  Comment: OTC Tylenol this morning.  Wt 181 lb 9.6 oz (82.4 kg)  SpO2 98%  BMI 23.32 kg/m2    Vital signs noted above. AAO ×3.  HEENT no nasal discharge, moist oral mucosa. Ears:TMs intact, no fluid collection. Neck: Supple neck, nonpalpable cervical lymph nodes.  Lungs: Clear to auscultation bilateral.   Heart: S1-S2 regular rate and rhythm, no murmurs were noted.  Abdomen:  with bowel sounds.  Extremities: pulses were full and equal.

## 2021-06-17 NOTE — PROGRESS NOTES
"Chief Complaint   Patient presents with     Cough     x1 week     Chest congestion     Chills     started last night \"shaking so bad\"     Fever     temp this morning was 100.3, last took tylenol 6am this morning         HPI    Patient is here for a week of cough with nasal congestion. Cough is productive. Last night he had chills. Today he had a fever at home 100.3, treated with Tylenol around 7 AM. Cough is severe, interrupting sleep.  No chest pain, shortness of breath. He took Mucinex without relief.     ROS: Pertinent ROS noted in HPI.     Allergies   Allergen Reactions     Mercaptopurine Myalgia     Extreme muscle weakness, required admission to hospital.        Patient Active Problem List   Diagnosis     Crohn's Disease     Vitamin B12 Deficiency     CAP (community acquired pneumonia)     Sepsis     Immunodeficiency     Streptococcal bacteremia       No family history on file.    Social History     Social History     Marital status:      Spouse name: N/A     Number of children: N/A     Years of education: N/A     Occupational History     Not on file.     Social History Main Topics     Smoking status: Never Smoker     Smokeless tobacco: Never Used     Alcohol use 0.6 oz/week     1 Cans of beer per week      Comment: once a month     Drug use: No     Sexual activity: Yes     Other Topics Concern     Not on file     Social History Narrative         Objective:    Vitals:    04/29/18 0941   BP: 110/56   Pulse: 86   Resp: 20   Temp: 98.6  F (37  C)   SpO2: 97%       Gen:NAD  Throat: oropharynx clear, tonsils normal  Nose: no discharge  Neck:NAD  CV: RRR, normal S1S2, no M, R, G  Pulm: CTAB, normal effort    CXR - right basilar infiltrates c/w with pneumonia per my interpretation, discussed during visit.        CAP (community acquired pneumonia)  -     azithromycin (ZITHROMAX Z-RANI) 250 MG tablet; Take 2 tablets (500 mg) on  Day 1,  followed by 1 tablet (250 mg) once daily on Days 2 through 5.    Cough  -     " XR Chest 2 Views  -     benzonatate (TESSALON) 200 MG capsule; Take 1 capsule (200 mg total) by mouth 3 (three) times a day as needed for cough.  -     codeine-guaiFENesin (GUAIFENESIN AC)  mg/5 mL liquid; Take 10 mL by mouth at bedtime as needed.

## 2021-06-19 NOTE — LETTER
Letter by Jorge Mooney MD at      Author: oJrge Mooney MD Service: -- Author Type: --    Filed:  Encounter Date: 7/11/2019 Status: (Other)         Elvin Castro  [address] [facility] [id number]             July 11, 2019         Dear Mr. Castro,    Below are the results from your recent visit:    Resulted Orders   Comprehensive Metabolic Panel   Result Value Ref Range    Sodium 137 136 - 145 mmol/L    Potassium 4.2 3.5 - 5.0 mmol/L    Chloride 104 98 - 107 mmol/L    CO2 28 22 - 31 mmol/L    Anion Gap, Calculation 5 5 - 18 mmol/L    Glucose 84 70 - 125 mg/dL    BUN 13 8 - 22 mg/dL    Creatinine 1.17 0.70 - 1.30 mg/dL    GFR MDRD Af Amer >60 >60 mL/min/1.73m2    GFR MDRD Non Af Amer >60 >60 mL/min/1.73m2    Bilirubin, Total 1.4 (H) 0.0 - 1.0 mg/dL    Calcium 9.5 8.5 - 10.5 mg/dL    Protein, Total 7.1 6.0 - 8.0 g/dL    Albumin 3.4 (L) 3.5 - 5.0 g/dL    Alkaline Phosphatase 72 45 - 120 U/L    AST 22 0 - 40 U/L    ALT 15 0 - 45 U/L    Narrative    Fasting Glucose reference range is 70-99 mg/dL per  American Diabetes Association (ADA) guidelines.   HM2(CBC w/o Differential)   Result Value Ref Range    WBC 11.2 (H) 4.0 - 11.0 thou/uL    RBC 4.51 4.40 - 6.20 mill/uL    Hemoglobin 13.2 (L) 14.0 - 18.0 g/dL    Hematocrit 40.1 40.0 - 54.0 %    MCV 89 80 - 100 fL    MCH 29.3 27.0 - 34.0 pg    MCHC 33.0 32.0 - 36.0 g/dL    RDW 12.5 11.0 - 14.5 %    Platelets 248 140 - 440 thou/uL    MPV 7.6 7.0 - 10.0 fL   Vitamin B12   Result Value Ref Range    Vitamin B-12 438 213 - 816 pg/mL   LDL Cholesterol, Direct   Result Value Ref Range    Direct LDL 59 <=129 mg/dl   HDL Cholesterol   Result Value Ref Range    HDL Cholesterol 48 >=40 mg/dL    Patient Fasting > 8hrs? Unknown        Kidney and liver tests are normal.  Bilirubin level is okay.    Blood sugar is normal.    Hemoglobin level is stable, okay at current level.  Borderline WBC level is okay, and lower than previous checks.    Your vitamin B12 level is  normal.    Excellent cholesterol levels, as expected.    Labs look good.  No change in treatment plan.    Please call with questions or contact us using [employer].    Sincerely,        Electronically signed by Jorge Mooney MD

## 2021-06-20 NOTE — LETTER
Letter by Kyle Castañeda MD at      Author: Kyle Castañeda MD Service: -- Author Type: --    Filed:  Encounter Date: 6/23/2020 Status: (Other)         Elvin Castro  [address] [facility] [address]             June 23, 2020         Dear Mr. Castro,    I am happy to report that your echocardiogram of June 23, 2020 came back normal, no change from 2017.  This is great news, since Dr. Baum from [facility] recommended that we get this echocardiogram since you have been on long-term Remicade.    I have still left without explanation for your leg edema.  I continue to wonder whether it might have something to do with a combination of antibiotics you've been on. But since the antibiotics should have completed by now, hopefully the swelling goes away.    Resulted Orders   Echo Complete   Result Value Ref Range    LV volume diastolic 115 62 - 150 cm3    LV volume systolic 46.9 21 - 61 cm3    HR 86 bpm    IVSd 0.868 0.6 - 1.0 cm    LVIDd 4.98 4.2 - 5.8 cm    LVIDs 3.55 2.5 - 4.0 cm    LVOT diam 2.2 cm    LVOT mean gradient 2 mmHg    LVOT peak VTI 17.5 cm    LVOT mean kelsi 59.9 cm/s    LVOT peak kelsi 89.4 cm/s    LVOT peak gradient 3 mmHg    LV PWd 0.92 0.6 - 1.0 cm    MV E' lat kelsi 7.83 cm/s    MV E' med kelsi 8.32 cm/s    AO root 3.4 cm    LA size 3 cm    LA/AO root ratio 0.882 no units    MV decel time 215 ms    MV peak A kelsi 89.8 cm/s    MV peak E kelsi 49.5 cm/s    BSA 2.11 m2    Hieght 74 in    Weight 3,024 lbs    /80 mmHg    IVS/PW ratio 0.9     LV FS 28.7 28 - 44 %    Echo LVEF calculated 59 55 - 75 %    LA volume 44.7 mL    LV mass 155.8 g    MV E/A Ratio 0.6     LVOT area 3.80 cm2    LVOT SV 66.5 cm3    LV systolic volume index 22.2 11 - 31 cm3/m2    LV diastolic volume index 54.5 34 - 74 cm3/m2    LA volume index 21.2 mL/m2    LV mass index 73.8 g/m2    LV SVi 31.5 ml/m2    TAPSE 2.0 cm    MV med E/e' ratio 5.9     MV lat E/e' ratio 6.3     LV CO 5.7 l/min    LV Ci 2.7 l/min/m2    LA area 2  17 cm2    LA area 1 13 cm2    Height 74.0 in    Weight 189 lbs    MV Avg E/e' Ratio 6.1 cm/s    LA length 4.2 cm    Narrative      1.Left ventricle ejection fraction is normal. The calculated left   ventricular ejection fraction is 59%.    2.TAPSE is normal, which is consistent with normal right ventricular   systolic function.    3.No hemodynamically significant valvular heart abnormalities.    4.When compared to the previous study dated 5/31/2017, no significant   change. If anything left ventricular wall motion is more robust on current   study.            Please call with questions or contact us using [employer].    Sincerely,        Electronically signed by Kyle Castañeda MD

## 2021-06-21 ENCOUNTER — COMMUNICATION - HEALTHEAST (OUTPATIENT)
Dept: SCHEDULING | Facility: CLINIC | Age: 66
End: 2021-06-21

## 2021-06-21 ENCOUNTER — TELEPHONE (OUTPATIENT)
Dept: NURSING | Facility: CLINIC | Age: 66
End: 2021-06-21

## 2021-06-21 ENCOUNTER — RECORDS - HEALTHEAST (OUTPATIENT)
Dept: SCHEDULING | Facility: CLINIC | Age: 66
End: 2021-06-21

## 2021-06-21 ENCOUNTER — COMMUNICATION - HEALTHEAST (OUTPATIENT)
Dept: PEDIATRICS | Facility: CLINIC | Age: 66
End: 2021-06-21

## 2021-06-21 NOTE — PROGRESS NOTES
[facility] clinic Follow Up Note    Elvin Castro   63 y.o. male    Date of Visit: 11/6/2018    Chief Complaint   Patient presents with     Establish Care     Subjective  Elvin is here as a new patient to establish care.  His main issue is Crohn's disease which is had for many years.  He is on Remicade.  Currently quiescent.    He has had 2 previous bowel resection surgeries and does have a diverting colostomy currently.  He has had his ileum and part of his large bowel removed in the past as well as another resection for what he thinks was a small bowel resection.    I did receive records from Dr. Kvng Baum of GI who is seeing him for his Crohn's.  Patient had a colonoscopy August 2017, no polyps, colitis and Crohn's.  Stricture was dilated.  2-year colonoscopy is due August of next year.    He has a dermatitis around his colostomy bag, he has been working with dermatology on that in the past.  Stable.  Still moderately severe.  His back is otherwise working okay.    He has severe osteoarthritis of the hands.  He is an  and pulls wire.    He has not been diagnosed with Crohn's associated inflammatory type arthritis.  He has not seen a rheumatologist.  He has not had an acute red or swollen joints.  Uses topical Aspercreme.  He avoids ibuprofen or Aleve with his Crohn's diagnosis.    No history of iritis.  He did see ophthalmology about 3 months ago.  No problems.  His father did have glaucoma.    No mouth sores.  No epigastric pain or history of upper GI bleed.  He is on Nexium.    He does have vitamin B12 deficiency, he said his terminal ileum removed.  He is on oral B12 2500 mcg a day.    He just had blood counts checked in September.  His hemoglobin was 13.1.    August 2018 Gold TB QuantiFERON test negative.  No history of TB.  No cough or respiratory symptoms.  He is never smoked.    I do not see that he has had a recent B12 level checked.    I do not see that he is taking IM B12  shots.    No neuropathy symptoms.    No history of diabetes.    His blood pressure is always been low to normal.    He does have a family history of coronary artery disease his father had a bypass.    Patient states that his LDL cholesterol is been very low in the past with his Crohn's and ileal resection.  He did not want to check that again today.    He is urinating normally.  He did asked to have his PSA checked.    Patient had a borderline elevated blood sugar in the past but he was sick with pneumonia at the time.  That was in .  He did want to have a hemoglobin A1c checked today.  His mother had diabetes.    Both his parents had dementia.    His father also had prostate cancer.    Patient did have a pneumonia May 2017 appears otherwise uncomplicated but he had severe post influenza pneumonia in  with sepsis, with that he reports he almost .    He has had some mild increasing dyspnea on exertion ever since his pneumonias but relatively stable over the last month.  No cough currently.    Patient is , lives independently at home.    No significant alcohol use.  He denies sleep apnea symptoms.    May 2017 cardiac echo normal.  2018 chest x-ray clear.    PMHx:    Past Medical History:   Diagnosis Date     Bowel obstruction (H)      Crohn's colitis (H)      Primary osteoarthritis of both hands 2018     PSHx:    Past Surgical History:   Procedure Laterality Date     ABDOMINAL SURGERY       COLON SURGERY       NC PART REMOVAL COLON W COLOSTOMY      Description: Partial Colectomy With Colostomy;  Recorded: 05/10/2008;     Immunizations:   Immunization History   Administered Date(s) Administered     DT (pediatric) 1990, 2000     Hep A, historic 2008, 2008     Hep B, historic 2008, 2008, 2008     Influenza [id number], 2009     Influenza, Seasonal, Inj PF IIV3 10/04/2011     Influenza, inj, historic,unspecified 10/15/2017     Pneumo Polysac 23-V  "06/03/2008, 07/05/2013     Td,adult,historic,unspecified 06/03/2008     Tdap 06/03/2008       ROS A comprehensive review of systems was performed and was otherwise negative    Medications, allergies, and problem list were reviewed and updated    Exam  /68 (Patient Site: Right Arm, Patient Position: Sitting, Cuff Size: Adult Regular)  Pulse 89  Ht 6' 1.5\" (1.867 m)  Wt 182 lb 5 oz (82.7 kg)  SpO2 98%  BMI 23.73 kg/m2  Alert and oriented x3.  Normal mood and affect.  Pupils and irises equal and reactive.  Extraocular muscles intact.  No jaundice or evidence of iritis.  He does have a pterygium medial right eye with minimal inflammation.  No pharyngitis, pharynx does not appear crowded.  No oral lesions or ulcers.  No thrush.  No cervical or supraclavicular adenopathy.  No JVD and no carotid bruits.  Lungs are clear to auscultation with good respiratory excursion.  Spine appears normal and straight.  Heart is regular with no murmur rub or gallop.  No ankle edema.  Abdomen is mildly tender throughout.  No guarding or rebound.  He has an ostomy in his left mid abdomen.  There is significant adhesive associated dermatitis around the ostomy without skin breakdown.  No significant epigastric tenderness.  Liver edge was palpable and normal.    Assessment/Plan  1. Crohn's disease of both small and large intestine with intestinal obstruction (H)  Quiesced sent.  Managed by gastroenterology.  2-year colonoscopy will be due next August.  Remicade managed through gastroenterology.  He gets labs checked periodically through gastroenterology.    TB test was negative this past August.  No new respiratory symptoms.    2. Vitamin B12 deficiency (non anemic)  On oral B12 supplement.  I would have him consider checking a B12 level in the spring at his physical.    Gastroenterology monitors his hemoglobin closely    3. Screening for prostate cancer  No symptoms  - PSA (Prostatic-Specific Antigen), Annual Screen    4. 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  He has seen dermatology in the past.  I encouraged him to talk to gastroenterology to see if they have an ostomy nurse that may help him with adhesive products.    5. Primary osteoarthritis of both hands  Moderately severe.  Works with hands as .  Aspercreme as needed.  We did discuss option for referral to rheumatology and he can request that in the future.  It Does not appear to be is associated inflammatory arthritis, however.    6. Hyperglycemia  He has a family history of diabetes with his mother.  - Glycosylated Hemoglobin A1c  borderline high blood sugar in the past but he was ill at the time.  Hemoglobin A1c today is normal.    Family history of glaucoma with father.  1 year recheck with ophthalmology next summer.    He will get a tetanus booster later this fall, but he is traveling tomorrow and did not want to get that today.    He will look into the insurance about the new shingles vaccine he does plan to get that.    He is already had the flu shot this season.    Appears to be relatively low cardiovascular risk profile.  Normal blood pressure non-smoker.  His father did have heart disease.  He did not want to check a cholesterol as he feels it will be very low given his terminal ileum resection.  Return in about 6 months (around 5/6/2019) for Annual physical.   Patient Instructions   Lab work today to check PSA and hemoglobin A1c.    Follow-up in the spring for physical exam.    You can schedule a injection appointment for the tetanus shot.    Check with your insurance about coverage for the new shingles vaccine.    Continue to follow with gastroenterology for your Crohn's management.    Jorge Mooney MD    Extensive review of medical record.  Review of previous laboratory studies.  Review of previous colonoscopy reports.  Review of previous cardiac echo from May 2017.    Current Outpatient Prescriptions   Medication Sig  Dispense Refill     ascorbic acid, vitamin C, (VITAMIN C) 1000 MG tablet Take 1,000 mg by mouth daily.       calcium citrate (CALCITRATE) 200 mg (950 mg) tablet Take 2 tablets by mouth 2 (two) times a day.       cholecalciferol, vitamin D3, 1,000 unit tablet Take 2,000 Units by mouth daily.        cyanocobalamin, vitamin B-12, 2,500 mcg Tab Take 1,250 mcg by mouth daily.       folic acid (FOLVITE) 1 MG tablet Take 1 mg by mouth as needed.        INFLIXIMAB (REMICADE IV) Infuse 1 Dose into a venous catheter. Every 6 weeks       No current facility-administered medications for this visit.      Allergies   Allergen Reactions     Mercaptopurine Myalgia     Extreme muscle weakness, required admission to hospital.      Social History   Substance Use Topics     Smoking status: Never Smoker     Smokeless tobacco: Never Used     Alcohol use 0.6 oz/week     1 Cans of beer per week      Comment: once a month

## 2021-06-22 ENCOUNTER — COMMUNICATION - HEALTHEAST (OUTPATIENT)
Dept: SCHEDULING | Facility: CLINIC | Age: 66
End: 2021-06-22

## 2021-06-22 ENCOUNTER — HOSPITAL ENCOUNTER (EMERGENCY)
Dept: EMERGENCY MEDICINE | Facility: CLINIC | Age: 66
Discharge: HOME | End: 2021-06-22
Attending: EMERGENCY MEDICINE

## 2021-06-22 ENCOUNTER — COMMUNICATION - HEALTHEAST (OUTPATIENT)
Dept: INTERNAL MEDICINE | Facility: CLINIC | Age: 66
End: 2021-06-22

## 2021-06-22 ENCOUNTER — AMBULATORY - HEALTHEAST (OUTPATIENT)
Dept: INTERNAL MEDICINE | Facility: CLINIC | Age: 66
End: 2021-06-22

## 2021-06-22 DIAGNOSIS — J98.9 RESPIRATORY ILLNESS: ICD-10-CM

## 2021-06-22 DIAGNOSIS — R50.9 FEVER, UNSPECIFIED FEVER CAUSE: ICD-10-CM

## 2021-06-22 LAB
ALBUMIN SERPL-MCNC: 3.3 G/DL (ref 3.5–5)
ALP SERPL-CCNC: 75 U/L (ref 45–120)
ALT SERPL W P-5'-P-CCNC: 15 U/L (ref 0–45)
ANION GAP SERPL CALCULATED.3IONS-SCNC: 8 MMOL/L (ref 5–18)
AST SERPL W P-5'-P-CCNC: 23 U/L (ref 0–40)
BASOPHILS # BLD AUTO: 0 THOU/UL (ref 0–0.2)
BASOPHILS NFR BLD AUTO: 0 % (ref 0–2)
BILIRUB SERPL-MCNC: 0.9 MG/DL (ref 0–1)
BUN SERPL-MCNC: 11 MG/DL (ref 8–22)
CALCIUM SERPL-MCNC: 8.3 MG/DL (ref 8.5–10.5)
CHLORIDE BLD-SCNC: 107 MMOL/L (ref 98–107)
CO2 SERPL-SCNC: 23 MMOL/L (ref 22–31)
CREAT SERPL-MCNC: 1.2 MG/DL (ref 0.7–1.3)
EOSINOPHIL # BLD AUTO: 0 THOU/UL (ref 0–0.4)
EOSINOPHIL NFR BLD AUTO: 0 % (ref 0–6)
ERYTHROCYTE [DISTWIDTH] IN BLOOD BY AUTOMATED COUNT: 12.8 % (ref 11–14.5)
GFR SERPL CREATININE-BSD FRML MDRD: >60 ML/MIN/1.73M2
GLUCOSE BLD-MCNC: 110 MG/DL (ref 70–125)
HCT VFR BLD AUTO: 40 % (ref 40–54)
HGB BLD-MCNC: 13.2 G/DL (ref 14–18)
IMM GRANULOCYTES # BLD: 0 THOU/UL
IMM GRANULOCYTES NFR BLD: 0 %
LACTATE SERPL-SCNC: 1 MMOL/L (ref 0.7–2)
LYMPHOCYTES # BLD AUTO: 2 THOU/UL (ref 0.8–4.4)
LYMPHOCYTES NFR BLD AUTO: 23 % (ref 20–40)
MCH RBC QN AUTO: 29.7 PG (ref 27–34)
MCHC RBC AUTO-ENTMCNC: 33 G/DL (ref 32–36)
MCV RBC AUTO: 90 FL (ref 80–100)
MONOCYTES # BLD AUTO: 0.9 THOU/UL (ref 0–0.9)
MONOCYTES NFR BLD AUTO: 10 % (ref 2–10)
NEUTROPHILS # BLD AUTO: 5.7 THOU/UL (ref 2–7.7)
NEUTROPHILS NFR BLD AUTO: 66 % (ref 50–70)
PLATELET # BLD AUTO: 247 THOU/UL (ref 140–440)
PMV BLD AUTO: 9.7 FL (ref 8.5–12.5)
POTASSIUM BLD-SCNC: 3.5 MMOL/L (ref 3.5–5)
PROT SERPL-MCNC: 7.5 G/DL (ref 6–8)
RBC # BLD AUTO: 4.45 MILL/UL (ref 4.4–6.2)
SODIUM SERPL-SCNC: 138 MMOL/L (ref 136–145)
WBC: 8.6 THOU/UL (ref 4–11)

## 2021-06-22 RX ORDER — BENZONATATE 200 MG/1
200 CAPSULE ORAL 3 TIMES DAILY PRN
Qty: 21 CAPSULE | Refills: 0 | Status: SHIPPED | OUTPATIENT
Start: 2021-06-22 | End: 2022-02-24

## 2021-06-25 NOTE — ED TRIAGE NOTES
Pt presents with concerns for fever, generalized body aches, cough and headache. Pt fully vaccinated for COVID-19. ABCs intact.

## 2021-06-26 NOTE — TELEPHONE ENCOUNTER
Reason contacted:  Message from Dr. Lechuga  Information relayed:  Patient is to take only the antibiotics ordered by ER doctor.    Additional questions:  No  Further follow-up needed:  No  Okay to leave a detailed message:  No

## 2021-06-26 NOTE — ED PROVIDER NOTES
EMERGENCY DEPARTMENT ENCOUNTER      CHIEF COMPLAINT    Chief Complaint   Patient presents with     Generalized Body Aches     Cough       ED COURSE & MEDICAL DECISION MAKING    DDX Includes, but not limited to: Viral illness, PNA, REJI, sepsis, dehydration, electrolyte abnormality, SUZANNA    6:00 AM The patient was interviewed and examined.  History in the chart was reviewed.  Pt here with ongoing symptoms for the past week including cough, myalgias, fevers and feeling off.  Pt seen yesterday for covid test, which has since come back negative.  Pt was unaware of this, concerned he could have pneumonia, so he came in now.  Pt nontoxic, low grade temp of 100.1 here. Will get labs, CXR, cultures, reassess.  Will not repeat covid testing as it was done yesterday (negative) and pt has been fully vaccinated since February.  Anticipate likely discharge.    6:40 AM Labs unremarkable including wbc and lactate wnl.    6:41 AM CXR (independent interpretation): no acute cardiopulmonary process   6:42 AM I rechecked and updated the patient.  Pt remains well, reassuring here. Will treat empirically for PNA given his age and immunosuppression on remicade for PNA with azithromycin and high dose amoxicillin.  Pt encouraged close f/u, agreeable to plan.      Any labs, xray, EKG, CT ordered and personally reviewed by me.    At the conclusion of the encounter I discussed  the results of all of the tests and the disposition with the patient and any family present.   All questions were answered.  The patient and any family present acknowledged understanding and was involved in the decision making regarding the overall care plan.      I discussed with patient the utility, limitations and findings of the exam/interventions/studies done during this visit as well as the list of differential diagnosis and symptoms to monitor/return to ER for.  Additional verbal discharge instructions were provided.     FINAL DIAGNOSIS:   1. Respiratory illness                                    ===================================================    HPI    Elvin Castro is a 65 y.o. male with history of GERD, Crohn's disease, immunodeficiency, and sepsis who presents by walk in for evaluation of COVID symptoms.     Patient reports that he has been sick for the last week. His symptoms include sinus congestion, body aches, cough, headaches, fever, chills, and nausea. He states he went to the walk in yesterday, 06/21/21, for a COVD test due to his symptoms. COVID test came back negative. Patient has been fully vaccinated for COVID since February as well. He has been taking Tylenol and Mucinex for his symptoms, but has not taken anything yet today. He denies being around any others who are sick. A few years ago, the patient developed pneumonia and sepsis. Due to these illnesses, he was concerned about his current symptoms which is what encouraged him to come to the ED. Patient denies vomiting, diarrhea, urinary symptoms, or any other complaints at this time.      PAST MEDICAL HISTORY    Past Surgical History:   Procedure Laterality Date     ABDOMINAL SURGERY      x3     CARDIAC SURGERY       COLON SURGERY       COLONOSCOPY       EYE SURGERY       INCISION AND DRAINAGE OF WOUND N/A 6/15/2020    Procedure: EXAM UNDER ANESTHESIA WITH DRAINAGE OF ABSCESS;  Surgeon: Kim Ornelas MD;  Location: [facility];  Service: General     NV PART REMOVAL COLON W COLOSTOMY      Description: Partial Colectomy With Colostomy;  Recorded: 05/10/2008;       Past Medical History:   Diagnosis Date     Bowel obstruction (H)      Crohn's colitis (H)      History of anesthesia complications      PONV (postoperative nausea and vomiting)      Primary osteoarthritis of both hands 11/6/2018       CURRENT MEDICATIONS    Patient's Medications   New Prescriptions    AMOXICILLIN (AMOXIL) 500 MG CAPSULE    Take 2 capsules (1,000 mg total) by mouth 3 (three) times a day for 7 days.     AMOXICILLIN-CLAVULANATE (AUGMENTIN) 875-125 MG PER TABLET    Take 1 tablet by mouth 2 (two) times a day for 7 days.    AZITHROMYCIN (ZITHROMAX) 250 MG TABLET    Take first 2 tablets together, then 1 every day until finished.    BENZONATATE (TESSALON) 200 MG CAPSULE    Take 1 capsule (200 mg total) by mouth 3 (three) times a day as needed for cough.   Previous Medications    ASCORBIC ACID, VITAMIN C, (VITAMIN C) 1000 MG TABLET    Take 1,000 mg by mouth daily.    CALCIUM CITRATE (CALCITRATE) 200 MG (950 MG) TABLET    Take 2 tablets by mouth 2 (two) times a day.    CHOLECALCIFEROL, VITAMIN D3, 1,000 UNIT TABLET    Take 2,000 Units by mouth daily.     CYANOCOBALAMIN, VITAMIN B-12, 2,500 MCG TAB    Take 1,250 mcg by mouth daily.    ESOMEPRAZOLE (NEXIUM) 20 MG CAPSULE    Take 2 capsules by mouth.    INFLIXIMAB (REMICADE) 100 MG INJECTION    Administer Remicade 10mg/kg every six weeks, infuse by IV route   Modified Medications    No medications on file   Discontinued Medications    No medications on file       ALLERGIES    Allergies   Allergen Reactions     Mercaptopurine Myalgia     Extreme muscle weakness, required admission to hospital.        FAMILY HISTORY    History reviewed. No pertinent family history.    SOCIAL HISTORY    Social History     Socioeconomic History     Marital status:      Spouse name: None     Number of children: None     Years of education: None     Highest education level: None   Occupational History     None   Social Needs     Financial resource strain: None     Food insecurity     Worry: None     Inability: None     Transportation needs     Medical: None     Non-medical: None   Tobacco Use     Smoking status: Never Smoker     Smokeless tobacco: Never Used   Substance and Sexual Activity     Alcohol use: Yes     Alcohol/week: 1.0 standard drinks     Types: 1 Cans of beer per week     Comment: once a month     Drug use: No     Sexual activity: Yes   Lifestyle     Physical activity     Days  per week: None     Minutes per session: None     Stress: None   Relationships     Social connections     Talks on phone: None     Gets together: None     Attends [employer] service: None     Active member of club or organization: None     Attends meetings of clubs or organizations: None     Relationship status: None     Intimate partner violence     Fear of current or ex partner: None     Emotionally abused: None     Physically abused: None     Forced sexual activity: None   Other Topics Concern     None   Social History Narrative     None       REVIEW OF SYSTEMS    Review of Systems   Constitutional: Positive for chills and fever.   HENT: Positive for congestion. Negative for sore throat.    Eyes: Negative for visual disturbance.   Respiratory: Positive for cough. Negative for shortness of breath.    Cardiovascular: Negative for chest pain.   Gastrointestinal: Positive for nausea. Negative for abdominal pain, diarrhea and vomiting.   Genitourinary: Negative.  Negative for dysuria, flank pain and frequency.   Musculoskeletal: Positive for myalgias. Negative for back pain.   Skin: Negative for rash.   Neurological: Positive for headaches. Negative for syncope and light-headedness.   Psychiatric/Behavioral: Negative for confusion.   All other systems reviewed and are negative.        PHYSICAL EXAM    VITAL SIGNS: /67   Pulse 96   Temp 100.1  F (37.8  C) (Oral)   Resp 18   Wt 180 lb (81.6 kg)   SpO2 97%   BMI 23.11 kg/m    Physical Exam   Constitutional: He is oriented to person, place, and time. He appears well-developed and well-nourished.   HENT:   Head: Normocephalic and atraumatic.   Mouth/Throat: Oropharynx is clear and moist. No oropharyngeal exudate, posterior oropharyngeal edema, posterior oropharyngeal erythema or tonsillar abscesses.   Eyes: Pupils are equal, round, and reactive to light. EOM are normal. No scleral icterus.   Neck: Neck supple.   No meningismus   Cardiovascular: Normal rate,  regular rhythm, normal heart sounds and intact distal pulses.   Pulses:       Radial pulses are 2+ on the right side and 2+ on the left side.        Dorsalis pedis pulses are 2+ on the right side and 2+ on the left side.   Pulmonary/Chest: Effort normal and breath sounds normal. No stridor.   Abdominal: Soft. He exhibits no distension. There is no abdominal tenderness. There is no guarding.   Musculoskeletal:         General: No tenderness or edema.      Comments: No calf tenderness/swelling b/l   Neurological: He is alert and oriented to person, place, and time.   No new focal neuro deficits   Skin: Skin is warm and dry. No rash noted.   Psychiatric: He has a normal mood and affect. His behavior is normal.   Nursing note and vitals reviewed.      LABS  Pertinent lab results reviewed in chart.  Results for orders placed or performed during the hospital encounter of 06/22/21   Comprehensive metabolic panel   Result Value Ref Range    Sodium 138 136 - 145 mmol/L    Potassium 3.5 3.5 - 5.0 mmol/L    Chloride 107 98 - 107 mmol/L    CO2 23 22 - 31 mmol/L    Anion Gap, Calculation 8 5 - 18 mmol/L    Glucose 110 70 - 125 mg/dL    BUN 11 8 - 22 mg/dL    Creatinine 1.20 0.70 - 1.30 mg/dL    GFR MDRD Af Amer >60 >60 mL/min/1.73m2    GFR MDRD Non Af Amer >60 >60 mL/min/1.73m2    Bilirubin, Total 0.9 0.0 - 1.0 mg/dL    Calcium 8.3 (L) 8.5 - 10.5 mg/dL    Protein, Total 7.5 6.0 - 8.0 g/dL    Albumin 3.3 (L) 3.5 - 5.0 g/dL    Alkaline Phosphatase 75 45 - 120 U/L    AST 23 0 - 40 U/L    ALT 15 0 - 45 U/L   Lactic Acid   Result Value Ref Range    Lactic Acid 1.0 0.7 - 2.0 mmol/L   HM1 (CBC with Diff)   Result Value Ref Range    WBC 8.6 4.0 - 11.0 thou/uL    RBC 4.45 4.40 - 6.20 mill/uL    Hemoglobin 13.2 (L) 14.0 - 18.0 g/dL    Hematocrit 40.0 40.0 - 54.0 %    MCV 90 80 - 100 fL    MCH 29.7 27.0 - 34.0 pg    MCHC 33.0 32.0 - 36.0 g/dL    RDW 12.8 11.0 - 14.5 %    Platelets 247 140 - 440 thou/uL    MPV 9.7 8.5 - 12.5 fL     Neutrophils % 66 50 - 70 %    Lymphocytes % 23 20 - 40 %    Monocytes % 10 2 - 10 %    Eosinophils % 0 0 - 6 %    Basophils % 0 0 - 2 %    Immature Granulocyte % 0 <=0 %    Neutrophils Absolute 5.7 2.0 - 7.7 thou/uL    Lymphocytes Absolute 2.0 0.8 - 4.4 thou/uL    Monocytes Absolute 0.9 0.0 - 0.9 thou/uL    Eosinophils Absolute 0.0 0.0 - 0.4 thou/uL    Basophils Absolute 0.0 0.0 - 0.2 thou/uL    Immature Granulocyte Absolute 0.0 <=0.0 thou/uL       EKG  none    RADIOLOGY  Images independently reviewed.  Xr Chest 2 Views    Result Date: 6/22/2021  EXAM: XR CHEST 2 VIEWS LOCATION: [facility] DATE/TIME: 6/22/2021 6:34 AM INDICATION: Cough. COMPARISON: 4/29/2018.     Negative chest.        PROCEDURES   none    ED MEDS  Medications   sodium chloride flush 10 mL (NS) (has no administration in time range)   sodium chloride 0.9% 1,000 mL (1,000 mL Intravenous New Bag 6/22/21 0611)   acetaminophen tablet 1,000 mg (TYLENOL) (1,000 mg Oral Given 6/22/21 0620)   ondansetron injection 4 mg (ZOFRAN) (4 mg Intravenous Given 6/22/21 0620)   ketorolac injection 15 mg (TORADOL) (15 mg Intravenous Given 6/22/21 0705)         I, Radha Anguiano, am serving as a scribe to document services personally performed by Dr. Case, based on my observations and the provider's statements to me.  I, Dr. Case, attest that Radha Anguiano is acting in a scribe capacity, has observed my performance of the services and has documented them in accordance with my direction.      Noam Case MD  06/22/21 [id number]

## 2021-06-26 NOTE — TELEPHONE ENCOUNTER
6-21-21  Reason for Call:  COVID Test    Detailed comments: patient called stated he had a covid test at [facility] 6-21-21 and the assistant stated results take 24-48 hours and patient is requesting for the rapid Test instead, he might need to  Be on antibiotics and if he has to wait 48 hours that's might be too long to wait for results, patient wants to know if he can change the sample to a rapid?  Patient also wants to know if the rapid and the 48 hours test has the same results?     Phone Number Patient can be reached at: Other phone number:  [phone]    Best Time: anytime    Can we leave a detailed message on this number?: Yes    Call taken on 6/21/2021 at 11:51 AM by Kerry Perales

## 2021-06-26 NOTE — TELEPHONE ENCOUNTER
Patient notified that his test is already in process.  He will have to wait for result or locate a different facility that can do the rapid response test.

## 2021-06-26 NOTE — TELEPHONE ENCOUNTER
Elvin calling for covid test result.  Result is still pending. Saw Dr. Lechuga today as a video visit. Paged on call provider, Dr. Lechuga.  He will check for covid results in the morning and call in antibiotic if test is negative.  Writer called patient back with update. Patient would like to use [facility] pharmacy in [place].     Cher Munguia RN, [facility] Triage Nurse Advisor

## 2021-06-26 NOTE — TELEPHONE ENCOUNTER
"Pt calls to request covid test scheduling.  Has already been triaged and had virtual visit with provider.  Order has been entered by provider today for PCR covid testing.    However pt states \"I was told by the  that they couldn't see the order.\"  Therefore will now warm-transfer pt to the covid scheduling line to ensure  is able to visualize pt's current order for testing ...  Reached  who states \"I cannot see the order -> \"Is this [facility] or [facility] patient?\"  Advised pt is [facility] ...   then states \"Oh okay let me get you over to a [facility] covid testing  ...\"  Now being transferred again ...  Successfully reached HE  this time who can visualize order and schedule PCR covid test for pt.    Julieth Schumacher RN  [facility] Triage     Reason for Disposition    Caller requesting an appointment, triage offered and declined    Protocols used: PCP CALL - NO TRIAGE-A-AH      "

## 2021-06-26 NOTE — TELEPHONE ENCOUNTER
Elvin calls and says that he went to the [facility] Covid Test Area and had a Covid test done today. Pt. Says that the Covid result is not back yet but says that he had a telehealth visit with Dr. Lechuga- who said that he would get pt. Augmentin. RN then checked Epic and saw that Dr. Lechuga documented that he would treat with Augmentin if the Covid result is normal or negative. RN told this information to pt. And pt. Says that he wants this nurse to leave Dr. Lechuga a message about possibly starting the Augmentin before the covid result is known. RN then left this message for Dr. Lechuga, as requested. Jennifer Andrea RN [facility] COVID 19 Nurse Triage Plan/Patient Instructions    Please be aware that novel coronavirus (COVID-19) may be circulating in the community. If you develop symptoms such as fever, cough, or SOB or if you have concerns about the presence of another infection including coronavirus (COVID-19), please contact your health care provider or visit  [email or web address].    Disposition/Instructions    Home care recommended. Follow home care protocol based instructions.    Thank you for taking steps to prevent the spread of this virus.  o Limit your contact with others.  o Wear a simple mask to cover your cough.  o Wash your hands well and often.    Resources    [facility]: About COVID-19: [email or web address]    CDC: What to Do If You're Sick: www.cdc.gov/coronavirus/2019-ncov/about/steps-when-sick.html    CDC: Ending Home Isolation: www.cdc.gov/coronavirus/2019-ncov/hcp/disposition-in-home-patients.html     CDC: Caring for Someone: www.cdc.gov/coronavirus/2019-ncov/if-you-are-sick/care-for-someone.html     [facility]: Interim Guidance for Hospital Discharge to Home: www.health.[facility].[place]/diseases/coronavirus/hcp/hospdischarge.pdf    [facility] clinical trials (COVID-19 research studies): clinicalaffairs.[facility]-clinical-trials     Below are the COVID-19 hotlines  at the [place] Department of Health ([facility]). Interpreters are available.   o For health questions: Call 036-168-6899 or 1-170.203.1312 (7 a.m. to 7 p.m.)  For questions about schools and childcare: Call 645-581-1486 or 1-553.551.7910 (7 a.m. to 7 p.m.)       Reason for Disposition    [1] Follow-up call to recent contact AND [2] information only call, no triage required    Additional Information    Negative: [1] Caller is not with the adult (patient) AND [2] reporting urgent symptoms    Negative: Lab result questions    Negative: Medication questions    Negative: Caller can't be reached by phone    Negative: Caller has already spoken to PCP or another triager    Negative: RN needs further essential information from caller in order to complete triage    Negative: Requesting regular office appointment    Negative: [1] Caller requesting NON-URGENT health information AND [2] PCP's office is the best resource    Negative: Health Information question, no triage required and triager able to answer question    Negative: General information question, no triage required and triager able to answer question    Negative: Question about upcoming scheduled test, no triage required and triager able to answer question    Negative: [1] Caller is not with the adult (patient) AND [2] probable NON-URGENT symptoms    Protocols used: INFORMATION ONLY CALL - NO [facility]

## 2021-06-26 NOTE — TELEPHONE ENCOUNTER
Reason for Call:  Other call back      Detailed comments: Pharmacy received 3 different antibiotics from 2 different Providers, - need to clarify which med is to be dispensed      Phone Number Patient can be reached at: Other phone number:  [phone]    Best Time: anytime    Can we leave a detailed message on this number?: Yes    Call taken on 6/22/2021 at 9:17 AM by Nubia Chang

## 2021-06-26 NOTE — TELEPHONE ENCOUNTER
ST x 1 week and burning in chest, plugged glands in neck, achy, coughing a lot.    99.1     COVID 19 Nurse Triage Plan/Patient Instructions    Please be aware that novel coronavirus (COVID-19) may be circulating in the community. If you develop symptoms such as fever, cough, or SOB or if you have concerns about the presence of another infection including coronavirus (COVID-19), please contact your health care provider or visit  [email or web address].    Disposition/Instructions    Virtual Visit with provider recommended. Reference Visit Selection Guide.    Thank you for taking steps to prevent the spread of this virus.  o Limit your contact with others.  o Wear a simple mask to cover your cough.  o Wash your hands well and often.    Resources    [facility]: About COVID-19: [email or web address]    CDC: What to Do If You're Sick: www.cdc.gov/coronavirus/2019-ncov/about/steps-when-sick.html    CDC: Ending Home Isolation: www.cdc.gov/coronavirus/2019-ncov/hcp/disposition-in-home-patients.html     CDC: Caring for Someone: www.cdc.gov/coronavirus/2019-ncov/if-you-are-sick/care-for-someone.html     [facility]: Interim Guidance for Hospital Discharge to Home: www.health.[facility].[place]/diseases/coronavirus/hcp/hospdischarge.pdf    [facility] clinical trials (COVID-19 research studies): clinicalaffairs.[facility]-clinical-trials     Below are the COVID-19 hotlines at the [facility] of Health ([facility]). Interpreters are available.   o For health questions: Call 704-698-7294 or 1-976.699.9251 (7 a.m. to 7 p.m.)  o For questions about schools and childcare: Call 306-144-6807 or 1-277.156.1246 (7 a.m. to 7 p.m.)   o   Reason for Disposition    SEVERE coughing spells (e.g., whooping sound after coughing, vomiting after coughing)    Additional Information    Negative: Severe difficulty breathing (e.g., struggling for each breath, speaks in single words)    Negative: Bluish (or gray) lips or  face now    Negative: [1] Difficulty breathing AND [2] exposure to flames, smoke, or fumes    Negative: [1] Stridor AND [2] difficulty breathing    Negative: Sounds like a life-threatening emergency to the triager    Negative: [1] Previous asthma attacks AND [2] this feels like asthma attack    Negative: Dry (non-productive) cough (i.e., no sputum or minimal clear sputum)    Negative: Chest pain  (Exception: MILD central chest pain, present only when coughing)    Negative: Difficulty breathing    Negative: Patient sounds very sick or weak to the triager    Negative: [1] Coughed up blood AND [2] > 1 tablespoon (15 ml) (Exception: blood-tinged sputum)    Negative: Fever > 103 F (39.4 C)    Negative: [1] Fever > 101 F (38.3 C) AND [2] age > 60    Negative: [1] Fever > 100.0 F (37.8 C) AND [2] bedridden (e.g., nursing home patient, CVA, chronic illness, recovering from surgery)    Negative: [1] Fever > 100.0 F (37.8 C) AND [2] diabetes mellitus or weak immune system (e.g., HIV positive, cancer chemo, splenectomy, organ transplant, chronic steroids)    Negative: Wheezing is present    Protocols used: COUGH - ACUTE [id number]

## 2021-06-27 LAB
AEROBIC BLOOD CULTURE BOTTLE: NO GROWTH
AEROBIC BLOOD CULTURE BOTTLE: NO GROWTH
ANAEROBIC BLOOD CULTURE BOTTLE: NO GROWTH
ANAEROBIC BLOOD CULTURE BOTTLE: NO GROWTH

## 2021-06-29 ENCOUNTER — RECORDS - HEALTHEAST (OUTPATIENT)
Dept: ADMINISTRATIVE | Facility: OTHER | Age: 66
End: 2021-06-29

## 2021-06-29 NOTE — PROGRESS NOTES
Progress Notes by Missy Julian CNP at 2020 10:00 AM     Author: Eliel, Missy J, CNP Service: Interventional Radiology Author Type: Nurse Practitioner    Filed: 2020  9:45 AM Date of Service: 2020 10:00 AM Status: Signed    : Missy Julian CNP (Nurse Practitioner)         Interventional Radiology - Pre-Procedure Note:  2020    Procedure Requested: Abscessogram  Requested by: Unique Garcia PA-C    Brief HPI: Elvin Castro is a 64 y.o. old male with history of Crohn's disease s/p partial colectomy with colostomy with presacral abscess s/p CT guided 10 Fr drain placement 20; s/p exchange/reposition with 8 Fr drain 20 with fistulous communication noted to rectosigmoid bowel. Patient presents today for follow up CT/abscessogram. Denies pain, fevers, chills. Reports rectal drainage with flushing. No leaking at drain exit site with flushing.     Drain cultures: No growth   Drain flushin mL NS  Drain outputs: 5 mL daily (net)     IMAGING:  CT today pending     [facility]  LOCATION: [facility]  DATE: 2020     PROCEDURE:   1. ABSCESSOGRAM WITH ABSCESS DRAIN REPLACEMENT     INTERVENTIONAL RADIOLOGIST: Da Larkin MD     HISTORY: 64 years-old Male with history of Crohn's disease and presacral abscess status post drainage 2020. 316 CT demonstrates the catheter is retracted into the soft tissues of the skin. The patient presents to interventional radiology today for   repeat abscessogram and possible drain change or removal.     CONSENT: The risks, benefits and alternatives of the procedure were discussed with the patient  in detail. All questions were answered. Informed consent was given to proceed with the procedure.     SEDATION: Versed 1.5 mg IV. Fentanyl 75 mcg IV. The procedure was performed with administration intravenous conscious sedation with appropriate preoperative, intraoperative, and postoperative evaluation. 15 minutes of supervised face to face  conscious   sedation time was provided by a radiology nurse under my direct supervision.     CONTRAST: 10 mL Omni 350  ANTIBIOTICS: None.  ADDITIONAL MEDICATIONS: None.     FLUOROSCOPIC TIME: 2.3   RADIATION DOSE: Air Kerma: 60 mGy.     COMPLICATIONS: No immediate complications.     STERILE BARRIER TECHNIQUE: Maximum sterile barrier technique was used. Cutaneous antisepsis was performed at the operative site with application of 2% chlorhexidine and large sterile drape. Prior to the procedure, the  and assistant performed   hand hygiene and wore hat, mask, sterile gown, and sterile gloves during the entire procedure.     PROCEDURE:   A  image was obtained. Aspiration from the existing drainage catheter yielded no significant drainage. Contrast was slowly injected into the catheter and images were obtained in anteroposterior and oblique projections.     At this point, the decision was made to attempt replacement of the catheter. The skin around the catheter was anesthetized with 1% lidocaine injected subcutaneously. The securing suture was removed. The pigtail locking mechanism was disengaged. A 0.035   inch angled Glidewire was advanced through the catheter and coiled within the fluid collection. The existing drainage catheter was then exchanged over the wire for a 5 Macanese KMP catheter. Additional contrast was administered which opacifies the residual   presacral collection as well as a patent enterocutaneous fistula. A 0.035 inch Amplatz wire was advanced through the KMP catheter and coiled within the collection. A new 8 Macanese drainage catheter was placed over the wire and coiled within the fluid   collection. Contrast injected through the tube with anteroposterior and oblique projection images obtained. The locking pigtail mechanism was engaged. The catheter was secured to the skin with a 2-0 nylon suture. The catheter was attached to a   Lauri-Mcdowell bulb. A clean and sterile dressing was applied.  The patient tolerated the procedure well.      FINDINGS:  Aspiration from the existing abscess catheter yielded no significant drainage. Abscessogram by a contrast injection through the catheter demonstrated malposition of the indwelling catheter. Following repositioning of a new catheter within the presacral   collection note was made of a persistent fistulous claudication with the adjacent rectosigmoid bowel.     IMPRESSION:   Malpositioned indwelling catheter. Following repositioning within the presacral collection note was made of a persistent fistulous claudication with the adjacent rectosigmoid bowel.     PLAN: These findings were discussed with Dr. Ornelas at the time of the procedure. The patient will return in 7-10 days for repeat abscessogram with CT scan prior to the procedure.     NPO: MN  ANTICOAGULANTS: None  ANTIBIOTICS: None    ALLERGIES  Mercaptopurine    EXAM:  /84   Pulse 84   Temp 98.1  F (36.7  C)   Resp 16   SpO2 98%   General: Stable. In no acute distress.  Neuro: A&O x 3.   Resp: Lungs clear to auscultation bilaterally.  Cardio: S1S2 and reg, without murmur, clicks or rubs  MSK: Right transgluteal drain exit site without erythema or drainage, pulled back from suture line, to bulb suction with small amount of tan fluid.     Pre-Sedation Assessment:  Mallampati Airway Classification: Class 3: soft and hard palates clearly visible  Previous reaction to anesthesia/sedation: no  Sedation plan based on assessment: Moderate  ASA Classification: ASA 3 - Patient with moderate systemic disease with functional limitations    ASSESSMENT/PLAN:   64 year old male with presacral abscess s/p CT guided 10 Fr drain placement 7/28/20; s/p exchange/reposition with 8 Fr drain 8/4/20 with fistulous communication noted to rectosigmoid bowel.    Abscessogram with possible drain reposition, exchange or removal with sedation as needed     Procedure, risk/benefits, and sedation reviewed with pt/family. All  questions answered. OK to proceed with above radiology procedure.     Missy Julian, CNP  Interventional Radiology

## 2021-07-02 NOTE — H&P
H&P by Missy Julian CNP at 9/8/2020  9:30 AM     Author: Eliel, Missy J, CNP Service: Interventional Radiology Author Type: Nurse Practitioner    Filed: 9/8/2020  9:04 AM Date of Service: 9/8/2020  9:30 AM Status: Signed    : Missy Julian CNP (Nurse Practitioner)         Interventional Radiology - Pre-Procedure Note:  9/8/2020    Procedure Requested: Abscessogram  Requested by: Renetta Kim CNP    Brief HPI: Elvin Castro is a 64 y.o. old male with history of Crohn's disease s/p partial colectomy with colostomy with presacral abscess s/p CT drain placement 7/28/20. Presents today for follow up abscessogram. Most recent abscessogram done 8/26 shows persistent colonic fistula, drain repositioned. Denies issues with drain leaking, pain, fevers, chills.     Drain flushing: NONE as instructed  Drain outputs: 3 mL or less per day    Dr. Ornelas is patient's surgeon-no follow up with him planned at this time.      IMAGING:  [facility]  LOCATION: [facility]  DATE: 8/26/2020  1. ABSCESS TUBE CHECK  2. ABSCESS TUBE CHANGE  3. CONSCIOUS SEDATION     INTERVENTIONAL RADIOLOGIST: Garry Lam MD     INDICATION: Crohn's disease. Colonic fistula..     CONSENT: The risks, benefits and alternatives of planned procedure were discussed with the patient  in detail. All questions were answered. Informed consent was given to proceed with the procedure.     MODERATE SEDATION: Versed 1.5 mg IV; Fentanyl 100 mcg IV. During the time out, immediately prior to the administration of medications, the patient was reassessed for adequacy to receive conscious sedation.  Under physician supervision, Versed and   fentanyl were administered for moderate sedation. Pulse oximetry, heart rate and blood pressure were continuously monitored by an independent trained observer. The physician spent 25 minutes of face-to-face sedation time with the patient.     CONTRAST: 15 ml  ANTIBIOTICS: None.  ADDITIONAL MEDICATIONS:  "None.     FLUOROSCOPIC TIME: 4.3 minutes.  RADIATION DOSE: DAP: 52 Gycm3.     COMPLICATIONS: No immediate complications.     STERILE BARRIER TECHNIQUE: Maximum sterile barrier technique was used. Cutaneous antisepsis was performed at the operative site with application of 2% chlorhexidine and large sterile drape. Prior to the procedure, the  and assistant performed   hand hygiene and wore hat, mask, sterile gown, and sterile gloves during the entire procedure.     PROCEDURE: Informed consent was obtained. The patient was then brought to the angiographic suite and placed supine on the table. Contrast was injected through the existing catheter and images obtained.     After reviewing the images, the patient was prepped and draped in a sterile fashion. A guidewire was inserted through the existing catheter which was removed. A 8 Senegalese straight catheter was repositioned with the tip of the catheter at the origin of the   fistula. Contrast was injected to confirm position.The catheter was sutured in place using 2-0 Ethilon. The patient tolerated the procedure well without immediate complication.      FINDINGS: Transgluteal pigtail catheter. Resolution of the abscess cavity with persistent fistula to the adjacent colon. New 8 Senegalese straight catheter was positioned at the origin of the fistula.     IMPRESSION:   1.  PERSISTENT COLONIC FISTULA WITH RESOLUTION OF ABSCESS CAVITY.  2.  8 Citizen of [place] STRAIGHT CATHETER WAS REPOSITIONED AT THE ORIGIN OF THE FISTULA.     PLAN: Follow-up tube check in 2 weeks time. The patient was instructed not to flush the catheter.    NPO: MN  ANTICOAGULANTS: None  ANTIBIOTICS: None    ALLERGIES  Mercaptopurine    EXAM:  /72   Pulse 73   Temp 98.5  F (36.9  C)   Resp 16   Ht 6' 2\" (1.88 m)   Wt 190 lb (86.2 kg)   SpO2 97%   BMI 24.39 kg/m    General: Stable. In no acute distress.  Neuro: A&O x 3. Moves all extremities equally.  Resp: Lungs clear to auscultation " bilaterally.  Cardio: S1S2 and reg, without murmur, clicks or rubs  MSK: Right transgluteal drain exit site without erythema or drainage to gravity bag with scant outputs.     Pre-Sedation Assessment:  Mallampati Airway Classification: Class 2: upper half of tonsil fossa visible  Previous reaction to anesthesia/sedation: no  Sedation plan based on assessment: Moderate  ASA Classification: ASA 3 - Patient with moderate systemic disease with functional limitations    ASSESSMENT/PLAN:   64 year old male with presacral abscess s/p CT drain placement 7/28/20; known colonic fistula    Abscessogram with possible drain reposition, exchange or removal with sedation as needed     Procedure, risk/benefits, and sedation reviewed with pt/family. All questions answered. OK to proceed with above radiology procedure.     Missy Julian, CNP  Interventional Radiology

## 2021-07-02 NOTE — H&P
H&P by Johanna Kim NP at 8/26/2020  9:00 AM     Author: Johanna Kim NP Service: Interventional Radiology Author Type: Nurse Practitioner    Filed: 8/26/2020  8:24 AM Date of Service: 8/26/2020  9:00 AM Status: Signed    : Johanna Kim NP (Nurse Practitioner)         Interventional Radiology - History and Physical  8/26/2020    Procedure Requested: Abscessogram   Requesting Provider: Missy Julian CNP    HPI: Elvin Castro is a 64 y.o. old male with history of Crohn's disease s/p partial colectomy with colostomy with presacral abscess s/p CT drain placement 7/28/20 that presents for follow up CT/Abscessogram.      Last abscessogram 8/12/20 shows persistent fistula to the rectum.     Feels well today.  No new pain or fevers.  Drain outputs trending down.  No drain problems.      Flushing:  None  Outputs:  < 5 cc daily for the past several days    Imaging:   CT Pelvis 8/26/20:  Pending    IR Abscessogram 8/12/20:    FINDINGS:  Aspiration from the existing abscess catheter yielded debris. Abscessogram by a contrast injection through the catheter demonstrated a no significant residual fluid collection  and a fistulous connection to the rectum. This was confirmed in multiple   obliquities.      IMPRESSION:   No significant residual abscess. Fistulous connection to the rectum again visualized. Due to the resolution of the abscess, will transition to gravity bag for drainage without flushing to encourage spontaneous closure of this fistula. Return to IR in 2   weeks with CT for follow-up.    NPO Status: Midnight   Anticoagulation/Antiplatelets/Bleeding tendencies: None   Antibiotics: None as it pertains to this procedure       PMH:  Past Medical History:   Diagnosis Date   ? Bowel obstruction (H)    ? Crohn's colitis (H)    ? History of anesthesia complications     Slow to wake   ? PONV (postoperative nausea and vomiting)    ? Primary osteoarthritis of both hands 11/6/2018       PSH:  Past Surgical  History:   Procedure Laterality Date   ? ABDOMINAL SURGERY      x3   ? CARDIAC SURGERY     ? COLON SURGERY     ? COLONOSCOPY     ? EYE SURGERY     ? INCISION AND DRAINAGE OF WOUND N/A 6/15/2020    Procedure: EXAM UNDER ANESTHESIA WITH DRAINAGE OF ABSCESS;  Surgeon: Kim Ornelas MD;  [facility]: [facility];  Service: General   ? HI PART REMOVAL COLON W COLOSTOMY      Description: Partial Colectomy With Colostomy;  Recorded: 05/10/2008;       ALLERGIES  Mercaptopurine    MEDICATIONS:  Current Outpatient Medications on File Prior to Encounter   Medication Sig Dispense Refill   ? ascorbic acid, vitamin C, (VITAMIN C) 1000 MG tablet Take 1,000 mg by mouth daily.     ? calcium citrate (CALCITRATE) 200 mg (950 mg) tablet Take 2 tablets by mouth 2 (two) times a day.     ? cholecalciferol, vitamin D3, 1,000 unit tablet Take 2,000 Units by mouth daily.      ? cyanocobalamin, vitamin B-12, 2,500 mcg Tab Take 1,250 mcg by mouth daily.     ? esomeprazole (NEXIUM) 20 MG capsule Take 2 capsules by mouth.     ? inFLIXimab (REMICADE) 100 mg injection Administer Remicade 10mg/kg every six weeks, infuse by IV route       Current Facility-Administered Medications on File Prior to Encounter   Medication Dose Route Frequency Provider Last Rate Last Dose   ? [COMPLETED] iohexoL 350 mg iodine/mL injection 100 mL (OMNIPAQUE)  100 mL Intravenous Once in imaging Missy Julian CNP   100 mL at 08/26/20 0734       LABS:  INR (no units)   Date Value   07/28/2020 1.03     Hemoglobin (g/dL)   Date Value   07/28/2020 14.5     Platelets (thou/uL)   Date Value   07/28/2020 277        EXAM:  /71   Pulse 76   Temp 98  F (36.7  C) (Oral)   Resp 18   Wt 190 lb (86.2 kg)   SpO2 98%   BMI 24.39 kg/m    General: Lying in bed, pleasantly talkative, in no acute distress  Neuro: A&O x3.  Moves all extremities equally.  Resp: Rate regular, breathing non labored, anterior lungs CTA bilaterally.  Cardio: S1S2 and reg, without murmur, clicks or  rubs.  Abdomen: Soft, non-distended and non-tender.  Transgluteal drain to gravity bag with small tan/kc appearing outputs.  Malodorous.  MSK:  No gross motor weakness.  Sensation intact.      Pre-Sedation Assessment:  Mallampati Airway Classification: Class 1: entire tonsil clearly visble  Previous reaction to anesthesia/sedation: no  Sedation plan based on assessment: Moderate  Sleep Apnea: no  Dentures: no  COPD: no  ASA Classification: ASA 3 - Patient with moderate systemic disease with functional limitations  Comments: no hx of asthma       ASSESSMENT:    64 y.o. old male with history of Crohn's disease s/p partial colectomy with colostomy with presacral abscess s/p CT drain placement 7/28/20 that presents for follow up CT/Abscessogram.      Flushing:  None  Outputs:  < 5 cc daily for the past several days      PLAN:    Abscessogram with possible drain reposition, exchange or removal with sedation as needed.      The procedure, risks and moderate sedation were discussed with patient, all questions answered and patient agrees to proceed with the procedure.      Johanna Kim [facility]  Interventional Radiology  [phone]

## 2021-07-02 NOTE — H&P
H&P by Unique Garcia Chi, PA-C at 8/4/2020  2:00 PM     Author: Unique Garcia Chi, PA-C Service: Interventional Radiology Author Type: Physician Assistant    Filed: 8/4/2020  1:32 PM Date of Service: 8/4/2020  2:00 PM Status: Signed    : Unique Garcia Chi, PA-C (Physician Assistant)         Interventional Radiology - History and Physical  8/4/2020    Procedure Requested: abscessogram with CT scan  Requesting Provider: Maylin Lopez, [place]    HPI: Elvin Castro is a 64 y.o. old male with history of ileocolonic Crohn's disease with permanent colostomy had a presacral collection concerning for abscess s/p CT guided drainage on 7/28/2020 where 3 ml of thicken fluid was removed and sent for cultures which is no growth to date.  Was on cipro and flagyl with last dose in June.  Dr. Ornelas is surgeon and had tried to drain this collection in office but was felt to be too high up the rectum for him to assess in office.  See note by GI on 7/14/2020 under MEDIA.     Patient reports he has been flushing the catheter twice day with 10 mL each time.  Immediately after procedure he felt leaking through his rectum with flushing even up until today.     Flushes were decreased down to 5ml on 5 days ago and there was net negative outputs from drain.      Drain started leaking around the catheter with flushing yesterday.      Denies any abdominal pain, pelvic pain, fevers, chills, chest pain, shortness of breath, trauma to catheter that he can remember.      Imaging:   CT today:  IMPRESSION:   1.  Placement of a percutaneous drainage catheter within a presacral collection. The catheter has mostly retracted outside of the collection and the collection persists.  7/28/2020:  CT abscess Drain:  IMPRESSION:   1.  Successful CT-guided drain placement into pelvic presacral collection.  2.  Of note, a thickened rind of surrounding fibrous tissue was encountered during the insertion of the drain into the collection.    NPO  Status: midnight   Anticoagulation/Antiplatelets/Bleeding tendencies: none   Antibiotics: none needed for procedure    Review of Systems: A comprehensive 10-point review of systems was performed. All systems were reviewed and negative with exception to those reported in the HPI.    PMH:  Past Medical History:   Diagnosis Date   ? Bowel obstruction (H)    ? Crohn's colitis (H)    ? History of anesthesia complications     Slow to wake   ? PONV (postoperative nausea and vomiting)    ? Primary osteoarthritis of both hands 11/6/2018       PSH:  Past Surgical History:   Procedure Laterality Date   ? ABDOMINAL SURGERY      x3   ? CARDIAC SURGERY     ? COLON SURGERY     ? COLONOSCOPY     ? EYE SURGERY     ? INCISION AND DRAINAGE OF WOUND N/A 6/15/2020    Procedure: EXAM UNDER ANESTHESIA WITH DRAINAGE OF ABSCESS;  Surgeon: Kim Ornelas MD;  Location: [facility];  Service: General   ? [place] PART REMOVAL COLON W COLOSTOMY      Description: Partial Colectomy With Colostomy;  Recorded: 05/10/2008;       ALLERGIES  Mercaptopurine    MEDICATIONS:  Current Outpatient Medications on File Prior to Encounter   Medication Sig Dispense Refill   ? ascorbic acid, vitamin C, (VITAMIN C) 1000 MG tablet Take 1,000 mg by mouth daily.     ? calcium citrate (CALCITRATE) 200 mg (950 mg) tablet Take 2 tablets by mouth 2 (two) times a day.     ? cholecalciferol, vitamin D3, 1,000 unit tablet Take 2,000 Units by mouth daily.      ? cyanocobalamin, vitamin B-12, 2,500 mcg Tab Take 1,250 mcg by mouth daily.     ? esomeprazole (NEXIUM) 20 MG capsule Take 2 capsules by mouth.     ? inFLIXimab (REMICADE) 100 mg injection Administer Remicade 10mg/kg every six weeks, infuse by IV route     ? sodium chloride (NS) injection Irrigate with 10 mL as directed 2 (two) times a day for 10 days. 200 mL 0     Current Facility-Administered Medications on File Prior to Encounter   Medication Dose Route Frequency Provider Last Rate Last Dose   ? [COMPLETED]  "iohexoL 350 mg iodine/mL injection 100 mL (OMNIPAQUE)  100 mL Intravenous Once in imaging Maylin Lopez CNP   100 mL at 08/04/20 1153       LABS:  INR (no units)   Date Value   07/28/2020 1.03     Hemoglobin (g/dL)   Date Value   07/28/2020 14.5     Platelets (thou/uL)   Date Value   07/28/2020 277     Creatinine (mg/dL)   Date Value   06/18/2020 1.15     Potassium (mmol/L)   Date Value   06/18/2020 3.8     EXAM:  /68   Pulse 83   Temp 98  F (36.7  C) (Oral)   Resp 16   Ht 6' 2\" (1.88 m)   Wt 190 lb (86.2 kg)   SpO2 99%   BMI 24.39 kg/m    General: Stable. In no acute distress  Neuro: A&O x3.  Moves all extremities equally.  Resp: Lungs CTA bilaterally.  Cardio: S1S2 and reg, without murmur, clicks or rubs.  Abdomen: Soft, non-distended and non-tender. Left opaque colostomy.  Right transgluteal catheter with clear output in tubing and small amount of serosanguinous fluid in guaze dressing.  Suture remains in place.      Pre-Sedation Assessment:  Mallampati Airway Classification: Class 2: upper half of tonsil fossa visible  Previous reaction to anesthesia/sedation: no  Sedation plan based on assessment: Moderate  Sleep Apnea: no  Dentures: no  COPD: no  ASA Classification: ASA 2 - Patient with mild systemic disease with no functional limitations    ASSESSMENT:  Presacral collection s/p IR drainage on 728 (Dr. Osman).  No growth on cultures 7/28/2020 however with rectal fistula with net negative outputs.      -CT today demonstrates retracted catheter with unchanged fluid collection.    -spoke with Dr. Ornelas 1:32 PM who would prefer drain be left in place as the patient is due to start biologics and would not like to retain any potential nidus for infection.  Would like to discuss with attending IR doc.  His raffy phone number has been passed to IR attending Dr. Larkin.      PLAN:    IR abscessogram with possible removal/reposition/exchange with sedation as needed.    The procedure, risks and moderate " sedation were discussed with patient, all questions answered and patient agrees to proceed with the procedure.  Written consent obtained.      [facility]  Interventional Radiology  [phone]

## 2021-07-03 NOTE — ADDENDUM NOTE
Addendum Note by Kyle Parker MD at 6/18/2020  9:20 AM     Author: Kyle Parker MD Service: -- Author Type: Physician    Filed: 6/18/2020  1:21 PM Encounter Date: 6/18/2020 Status: Signed    : Kyle Parker MD (Physician)    Addended by: KYLE PARKER on: 6/18/2020 01:21 PM        Modules accepted: Orders

## 2021-07-03 NOTE — INTERVAL H&P NOTE
Interval H&P Note by Kim Ornelas MD at 6/15/2020 11:03 AM     Author: Kim Ornelas MD Service: Surgery Author Type: Physician    Filed: 6/15/2020 11:55 AM Date of Service: 6/15/2020 11:03 AM Status: Signed    : Kim Ornelas MD (Physician)       I have performed an assessment and examined the patient, as necessary, to update the patient's current status that may have changed since the prior History and Physical.  The History & Physical has been reviewed and no updates are needed.      OR for EUA and drainage of abscess.    Kim Ornelas MD, KISHA  [facility]  Pager: [phone]  Office: [phone]  6/15/2020 11:55 AM          Source Note     Author: Kyle Castañeda MD Service: -- Author Type: Physician    Filed: 6/12/2020  8:56 AM Date of Service: 6/11/2020 10:50 AM Status: Signed    : Kyle Castañeda MD (Physician)       Preoperative Exam    Scheduled Procedure: EXAM UNDER ANESTHESIA WITH DRAINAGE OF ABSCESS   Surgery Date:  6/15/2020  Surgery Location: [facility], fax [phone]    Surgeon:  Dr. Ornelas    Assessment/Plan:     Satisfactory preoperative medical exam for scheduled drainage of abscess and examination under anesthesia for perianal abscess in the context of long history of Crohn's disease.    Today for preop testing we will get a CBC and basic metabolic panel.  Today's EKG from June 11, 2020 shows normal sinus rhythm at a rate of 76, leftward axis of -50 degrees, left anterior hemiblock, no significant change compared to December 2019.    LATER: Basic metabolic panel and CBC are entirely normal except for slightly reduced hemoglobin of 13.5, still satisfactory for preop purposes.  Results at the bottom of this document.    He will have his COVID screening test tomorrow morning June 12     He is not experiencing any cardiac or pulmonary symptoms.  He does have history of a severe pneumonia in  2017 with sepsis, but is now fully recovered.  He  is never smoker.  Consumes 0 alcohol these days.  No history of any bleeding or clotting problems.  No history of severe anesthesia complications.    He is on ciprofloxacin and metronidazole started on June 5 as initial medical management for the abscess.  He thinks that the pain improved by about 50%.    He is on Remicade for his Crohn's disease, under the supervision of Dr. Kvng Baum at [facility], has been on Remicade for about 15 years.  No longer on methotrexate.    Colostomy left lower quadrant.  Approximately 12 cm of rectosigmoid remnant.    Osteopenia demonstrated on DEXA bone density scan from 2015, with a 10% decline over 1 decade.  I have gone ahead and ordered a updated DEXA bone density scan for him.    Vitamin B12 deficiency, on high-dose oral replacement    Left anterior hemiblock on EKG, stable, asymptomatic      Surgical Procedure Risk: Low (reported cardiac risk generally < 1%)  Have you had prior anesthesia?: Yes  Have you or any family members had a previous anesthesia reaction:  No  Do you or any family members have a history of a clotting or bleeding disorder?: No  Cardiac Risk Assessment: no increased risk for major cardiac complications    APPROVAL GIVEN to proceed with proposed procedure, without further diagnostic evaluation    Functional Status: Independent  Patient plans to recover at home with family.     Subjective:      Elvin Castro is a 64 y.o. male who presents for a preoperative consultation.    He will have his COVID screening test tomorrow morning June 12    He is not experiencing any cardiac or pulmonary symptoms.  He does have history of a severe pneumonia in  2017 with sepsis, but is now fully recovered.  He is never smoker.  Consumes 0 alcohol these days.  No history of any bleeding or clotting problems.  No history of severe anesthesia complications.    He is on ciprofloxacin and metronidazole started on June 5 as initial medical management for the  abscess.  He thinks that the pain improved by about 50%.    He is on Remicade for his Crohn's disease, under the supervision of Dr. Kvng Baum at [facility], has been on Remicade for about 15 years.  No longer on methotrexate.    Colostomy left lower quadrant.  Approximately 12 cm of rectosigmoid remnant.    Wt Readings from Last 3 Encounters:   06/11/20 189 lb 8 oz (86 kg)   12/16/19 182 lb 4.8 oz (82.7 kg)   07/10/19 180 lb 9 oz (81.9 kg)     Crohn's disease diagnosed in 1986.  He had 2 episodes of bowel resection including his ileum and 86 and 93.  He has a diverting colostomy that is working well.  Some dermatitis around the colostomy opening is being managed with his dermatologist, he does use a topical cream    EGD in 2010 normal.  No oral lesions.  Has been off methotrexate for 3 years he is just on Remicade now.    Gold TB quant interferon test was negative 2018     Lives independently with wife.  He cares for his elderly father with Alzheimer's.     He has a diagnosis of B12 deficiency but he manages with an oral B12 supplement.  He states he actually had a high B12 level in the past and no longer does IM.     Bone density 2015 with a spine score of -1.0.  Femur score -0.8/-1.2 .  No history of fracture.    All other systems reviewed and are negative, other than those listed in the HPI.    Pertinent History  Do you have difficulty breathing or chest pain after walking up a flight of stairs: No  History of obstructive sleep apnea: No  Steroid use in the last 6 months: No  Frequent Aspirin/NSAID use: No  Prior Blood Transfusion: No  Prior Blood Transfusion Reaction: No  If for some reason prior to, during or after the procedure, if it is medically indicated, would you be willing to have a blood transfusion?:  There is no transfusion refusal.    Current Outpatient Medications   Medication Sig Dispense Refill   ? ascorbic acid, vitamin C, (VITAMIN C) 1000 MG tablet Take 1,000 mg by mouth  daily.     ? calcium citrate (CALCITRATE) 200 mg (950 mg) tablet Take 2 tablets by mouth 2 (two) times a day.     ? cholecalciferol, vitamin D3, 1,000 unit tablet Take 2,000 Units by mouth daily.      ? cyanocobalamin, vitamin B-12, 2,500 mcg Tab Take 1,250 mcg by mouth daily.     ? folic acid (FOLVITE) 1 MG tablet Take 1 mg by mouth as needed.      ? INFLIXIMAB (REMICADE IV) Infuse 1 Dose into a venous catheter. Every 6 weeks       No current facility-administered medications for this visit.         Allergies   Allergen Reactions   ? Mercaptopurine Myalgia     Extreme muscle weakness, required admission to hospital.        Patient Active Problem List   Diagnosis   ? CAP (community acquired pneumonia)   ? Sepsis (H)   ? Immunodeficiency (H)   ? Streptococcal bacteremia   ? Crohn's disease of both small and large intestine with intestinal obstruction (H)   ? Vitamin B12 deficiency (non anemic)   ? Dermatitis   ? Primary osteoarthritis of both hands   ? LAFB (left anterior fascicular block)       Past Medical History:   Diagnosis Date   ? Bowel obstruction (H)    ? Crohn's colitis (H)    ? Primary osteoarthritis of both hands 11/6/2018       Past Surgical History:   Procedure Laterality Date   ? ABDOMINAL SURGERY     ? COLON SURGERY     ? NH PART REMOVAL COLON W COLOSTOMY      Description: Partial Colectomy With Colostomy;  Recorded: 05/10/2008;       Social History     Socioeconomic History   ? Marital status:      Spouse name: Not on file   ? Number of children: Not on file   ? Years of education: Not on file   ? Highest education level: Not on file   Occupational History   ? Not on file   Social Needs   ? Financial resource strain: Not on file   ? Food insecurity     Worry: Not on file     Inability: Not on file   ? Transportation needs     Medical: Not on file     Non-medical: Not on file   Tobacco Use   ? Smoking status: Never Smoker   ? Smokeless tobacco: Never Used   Substance and Sexual Activity   ?  Alcohol use: Yes     Alcohol/week: 1.0 standard drinks     Types: 1 Cans of beer per week     Comment: once a month   ? Drug use: No   ? Sexual activity: Yes   Lifestyle   ? Physical activity     Days per week: Not on file     Minutes per session: Not on file   ? Stress: Not on file   Relationships   ? Social connections     Talks on phone: Not on file     Gets together: Not on file     Attends [employer] service: Not on file     Active member of club or organization: Not on file     Attends meetings of clubs or organizations: Not on file     Relationship status: Not on file   ? Intimate partner violence     Fear of current or ex partner: Not on file     Emotionally abused: Not on file     Physically abused: Not on file     Forced sexual activity: Not on file   Other Topics Concern   ? Not on file   Social History Narrative   ? Not on file       Objective:     /78 (Patient Site: Right Arm, Patient Position: Sitting, Cuff Size: Adult Large)   Pulse 78   Temp 98.2  F (36.8  C) (Oral)   Wt 189 lb 8 oz (86 kg)   SpO2 97%   BMI 24.33 kg/m        Physical Exam:  General: Alert, in no distress  Skin: No significant lesion seen.  Eyes/nose/throat: Eyes without scleral icterus, eye movements normal, pupils equal and reactive, oropharynx clear  MSK: Neck with good ROM  Lymphatic: Neck without adenopathy or masses  Pulm: Lungs clear to auscultation bilaterally  Cardiac: Heart with regular rate and rhythm, no murmur or gallop  GI: Abdomen soft, nontender. + Colostomy left lower quadrant  MSK: Extremities no tenderness or edema  Neuro: Moves all extremities, without focal weakness  Psych: Alert, normal mental status. Normal affect and speech    Labs:  Recent Results (from the past 48 hour(s))   Electrocardiogram Perform and Read    Collection Time: 06/11/20 10:59 AM   Result Value Ref Range    SYSTOLIC BLOOD PRESSURE      DIASTOLIC BLOOD PRESSURE      VENTRICULAR RATE 76 BPM    ATRIAL RATE 76 BPM    P-R INTERVAL 182 ms     QRS DURATION 100 ms    Q-T INTERVAL 404 ms    QTC CALCULATION (BEZET) 454 ms    P Axis 71 degrees    R AXIS -50 degrees    T AXIS 53 degrees    MUSE DIAGNOSIS       Normal sinus rhythm with sinus arrhythmia  Left anterior fascicular block  Abnormal ECG  When compared with ECG of 16-DEC-2019 10:33,  No significant change was found     Basic Metabolic Panel    Collection Time: 06/11/20 11:28 AM   Result Value Ref Range    Sodium 139 136 - 145 mmol/L    Potassium 3.9 3.5 - 5.0 mmol/L    Chloride 104 98 - 107 mmol/L    CO2 24 22 - 31 mmol/L    Anion Gap, Calculation 11 5 - 18 mmol/L    Glucose 103 70 - 125 mg/dL    Calcium 8.7 8.5 - 10.5 mg/dL    BUN 8 8 - 22 mg/dL    Creatinine 1.13 0.70 - 1.30 mg/dL    GFR MDRD Af Amer >60 >60 mL/min/1.73m2    GFR MDRD Non Af Amer >60 >60 mL/min/1.73m2   HM1 (CBC with Diff)    Collection Time: 06/11/20 11:28 AM   Result Value Ref Range    WBC 11.0 4.0 - 11.0 thou/uL    RBC 4.54 4.40 - 6.20 mill/uL    Hemoglobin 13.5 (L) 14.0 - 18.0 g/dL    Hematocrit 41.0 40.0 - 54.0 %    MCV 90 80 - 100 fL    MCH 29.7 27.0 - 34.0 pg    MCHC 32.9 32.0 - 36.0 g/dL    RDW 11.8 11.0 - 14.5 %    Platelets 341 140 - 440 thou/uL    MPV 7.4 7.0 - 10.0 fL    Neutrophils % 58 50 - 70 %    Lymphocytes % 31 20 - 40 %    Monocytes % 8 2 - 10 %    Eosinophils % 3 0 - 6 %    Basophils % 1 0 - 2 %    Neutrophils Absolute 6.3 2.0 - 7.7 thou/uL    Lymphocytes Absolute 3.5 0.8 - 4.4 thou/uL    Monocytes Absolute 0.9 0.0 - 0.9 thou/uL    Eosinophils Absolute 0.3 0.0 - 0.4 thou/uL    Basophils Absolute 0.1 0.0 - 0.2 thou/uL        Immunization History   Administered Date(s) Administered   ? DT (pediatric) 01/01/1990, 07/05/2000   ? Hep A, historic 06/03/2008, 12/30/2008   ? Hep B, historic 06/03/2008, 06/30/2008, 12/30/2008   ? Influenza [id number], 12/12/2009   ? Influenza, Seasonal, Inj PF IIV3 10/04/2011   ? Influenza, inj, historic,unspecified 10/15/2017   ? Pneumo Polysac 23-V 06/03/2008, 07/05/2013   ? Pneumococcal  Vaccine, Unspecified 05/30/2014, 04/30/2019   ? Td,adult,historic,unspecified 06/03/2008   ? Tdap 06/03/2008       Electronically signed by Kyle Castañeda MD 06/11/20 10:46 AM

## 2021-07-04 ENCOUNTER — HEALTH MAINTENANCE LETTER (OUTPATIENT)
Age: 66
End: 2021-07-04

## 2021-07-04 NOTE — OP NOTE
Op Note by Kim Ornelas MD at 6/15/2020 11:03 AM     Author: Kim Ornelas MD Service: Surgery Author Type: Physician    Filed: 6/15/2020  1:21 PM Date of Service: 6/15/2020 11:03 AM Status: Signed    : Kim Ornelas MD (Physician)       [facility]AT[facility]    DATE OF SERVICE: 6/15/2020     PROCEDURE NAME: Rectal exam under anesthesia     PRE-PROCEDURE DIAGNOSIS:   1. Crohn 's Disease  2. Pre-sacral fluid collection     POST-PROCEDURE DIAGNOSIS: Same     SURGEON: Kim Ornelas MD     ASSISTANT: Jose M Kaur MD, PhD [facility] Colorectal Fellow     FINDINGS: No abscess cavity found     ESTIMATED BLOOD LOSS: 1 mL     ANESTHESIA: GETA     SPECIMEN: None.     INDICATIONS FOR PROCEDURE:  Elvin Castro is a 64 y.o. male presenting with deep pelvic pain in the setting of Crohn's. An MRE demonstrated a 3cm x 3cm fluid collection in the pre-sacral space. It was unclear as to whether this was an abscess or seroma. He was having some symptoms, so we discussed draining it.  The risks and benefits of surgery were discussed with the patient including infection, abscess recurrence, need for further operative interventions, possible damage to the sphincter and incontinence (or changes in continence) and increased pain and bleeding were discussed with the patient. Alternatives were also discussed. The patient agreed to proceed with surgery and informed consent was obtained.     DESCRIPTION OF PROCEDURE:  The patient was taken to the operating room and placed under general anesthesia. He was then placed in the lithotomy position on the operating room table. The surgical area was then prepped and draped in the usual sterile fashion. A timeout was performed per protocol.  Examination of the perianal skin was unremarkable.  Next, a terminal pudendal nerve block was performed with 30mL of a 50/50 mix of 1% lidocaine with epinephrine and 0.25% bupivicaine. A digital rectal exam was performed which  revealed no palpable mass.  Next a lubricated Mcdowell bivalve anoscope was placed into the anal canal.  Examination of the anal canal was significant for some mild diversion proctitis. There was no real mass or fluctuance I could feel on DARIO. Because this was a supra-levator fluid collection, it required drainage through the rectum. An 18G spinal needle was placed in the anal canal. Based on imaging, it extended down to the level of S3. Multiple passes of the needle only returned a small quantity of blood. No abscess or fluid collection. At this point, the planned procedure was terminated.     Hemostasis was achieved. We then removed the anoscope and placed a surgicel in the anal canal. All sponge, needle and instrument counts were correct at the end of the procedure. The patient tolerated the procedure well and was awakened from anesthesia without difficulty, and transferred to the recovery room in stable condition.    COMPLICATIONS: None apparent    DISPOSITION: Stable, extubated, to PACU    ATTESTATION: I, Kim Ornelas, was present and scrubbed for the duration of the procedure    Kim Ornelas MD, KISHA  [facility]  Pager: [phone]  Office: [phone]  6/15/2020 1:16 PM

## 2021-07-06 VITALS — WEIGHT: 180 LBS | BODY MASS INDEX: 23.11 KG/M2

## 2021-08-03 PROBLEM — R65.20 SEVERE SEPSIS (H): Status: RESOLVED | Noted: 2017-05-04 | Resolved: 2017-05-06

## 2021-08-03 PROBLEM — A41.9 SEVERE SEPSIS (H): Status: RESOLVED | Noted: 2017-05-04 | Resolved: 2017-05-06

## 2021-08-03 PROBLEM — A41.9 SEPSIS (H): Status: RESOLVED | Noted: 2017-05-04 | Resolved: 2020-06-11

## 2021-08-10 ENCOUNTER — TRANSFERRED RECORDS (OUTPATIENT)
Dept: HEALTH INFORMATION MANAGEMENT | Facility: CLINIC | Age: 66
End: 2021-08-10

## 2021-08-11 ENCOUNTER — TRANSFERRED RECORDS (OUTPATIENT)
Dept: HEALTH INFORMATION MANAGEMENT | Facility: CLINIC | Age: 66
End: 2021-08-11

## 2021-09-14 ENCOUNTER — TRANSFERRED RECORDS (OUTPATIENT)
Dept: HEALTH INFORMATION MANAGEMENT | Facility: CLINIC | Age: 66
End: 2021-09-14

## 2021-09-22 ENCOUNTER — TRANSFERRED RECORDS (OUTPATIENT)
Dept: HEALTH INFORMATION MANAGEMENT | Facility: CLINIC | Age: 66
End: 2021-09-22

## 2021-10-13 ENCOUNTER — TRANSFERRED RECORDS (OUTPATIENT)
Dept: HEALTH INFORMATION MANAGEMENT | Facility: CLINIC | Age: 66
End: 2021-10-13

## 2021-10-24 ENCOUNTER — HEALTH MAINTENANCE LETTER (OUTPATIENT)
Age: 66
End: 2021-10-24

## 2021-10-27 ENCOUNTER — TRANSFERRED RECORDS (OUTPATIENT)
Dept: HEALTH INFORMATION MANAGEMENT | Facility: CLINIC | Age: 66
End: 2021-10-27

## 2021-11-03 ENCOUNTER — TRANSFERRED RECORDS (OUTPATIENT)
Dept: HEALTH INFORMATION MANAGEMENT | Facility: CLINIC | Age: 66
End: 2021-11-03

## 2021-11-03 LAB
ALT SERPL-CCNC: 14 IU/L (ref 0–44)
AST SERPL-CCNC: 16 IU/L (ref 0–40)

## 2021-12-15 ENCOUNTER — TRANSFERRED RECORDS (OUTPATIENT)
Dept: HEALTH INFORMATION MANAGEMENT | Facility: CLINIC | Age: 66
End: 2021-12-15

## 2022-02-16 ENCOUNTER — OFFICE VISIT (OUTPATIENT)
Dept: INTERNAL MEDICINE | Facility: CLINIC | Age: 67
End: 2022-02-16
Payer: COMMERCIAL

## 2022-02-16 VITALS
BODY MASS INDEX: 22.56 KG/M2 | SYSTOLIC BLOOD PRESSURE: 116 MMHG | WEIGHT: 175.7 LBS | OXYGEN SATURATION: 98 % | HEART RATE: 89 BPM | DIASTOLIC BLOOD PRESSURE: 71 MMHG

## 2022-02-16 DIAGNOSIS — R07.9 CHEST PAIN, UNSPECIFIED TYPE: Primary | ICD-10-CM

## 2022-02-16 DIAGNOSIS — Z12.5 SCREENING FOR PROSTATE CANCER: ICD-10-CM

## 2022-02-16 DIAGNOSIS — K50.812 CROHN'S DISEASE OF BOTH SMALL AND LARGE INTESTINE WITH INTESTINAL OBSTRUCTION (H): ICD-10-CM

## 2022-02-16 DIAGNOSIS — Z11.59 NEED FOR HEPATITIS C SCREENING TEST: ICD-10-CM

## 2022-02-16 DIAGNOSIS — R10.13 EPIGASTRIC PAIN: ICD-10-CM

## 2022-02-16 LAB
ALBUMIN SERPL-MCNC: 3.3 G/DL (ref 3.5–5)
ALP SERPL-CCNC: 75 U/L (ref 45–120)
ALT SERPL W P-5'-P-CCNC: 12 U/L (ref 0–45)
ANION GAP SERPL CALCULATED.3IONS-SCNC: 8 MMOL/L (ref 5–18)
AST SERPL W P-5'-P-CCNC: 18 U/L (ref 0–40)
BASOPHILS # BLD AUTO: 0.1 10E3/UL (ref 0–0.2)
BASOPHILS NFR BLD AUTO: 1 %
BILIRUB SERPL-MCNC: 1 MG/DL (ref 0–1)
BUN SERPL-MCNC: 11 MG/DL (ref 8–22)
CALCIUM SERPL-MCNC: 8.9 MG/DL (ref 8.5–10.5)
CHLORIDE BLD-SCNC: 104 MMOL/L (ref 98–107)
CO2 SERPL-SCNC: 25 MMOL/L (ref 22–31)
CREAT SERPL-MCNC: 1.18 MG/DL (ref 0.7–1.3)
EOSINOPHIL # BLD AUTO: 0.1 10E3/UL (ref 0–0.7)
EOSINOPHIL NFR BLD AUTO: 1 %
ERYTHROCYTE [DISTWIDTH] IN BLOOD BY AUTOMATED COUNT: 12.4 % (ref 10–15)
GFR SERPL CREATININE-BSD FRML MDRD: 68 ML/MIN/1.73M2
GLUCOSE BLD-MCNC: 91 MG/DL (ref 70–125)
HCT VFR BLD AUTO: 40.4 % (ref 40–53)
HGB BLD-MCNC: 13.3 G/DL (ref 13.3–17.7)
IMM GRANULOCYTES # BLD: 0 10E3/UL
IMM GRANULOCYTES NFR BLD: 0 %
LIPASE SERPL-CCNC: 47 U/L (ref 0–52)
LYMPHOCYTES # BLD AUTO: 4 10E3/UL (ref 0.8–5.3)
LYMPHOCYTES NFR BLD AUTO: 33 %
MCH RBC QN AUTO: 29.5 PG (ref 26.5–33)
MCHC RBC AUTO-ENTMCNC: 32.9 G/DL (ref 31.5–36.5)
MCV RBC AUTO: 90 FL (ref 78–100)
MONOCYTES # BLD AUTO: 1 10E3/UL (ref 0–1.3)
MONOCYTES NFR BLD AUTO: 8 %
NEUTROPHILS # BLD AUTO: 6.8 10E3/UL (ref 1.6–8.3)
NEUTROPHILS NFR BLD AUTO: 57 %
PLATELET # BLD AUTO: 277 10E3/UL (ref 150–450)
POTASSIUM BLD-SCNC: 3.7 MMOL/L (ref 3.5–5)
PROT SERPL-MCNC: 7.5 G/DL (ref 6–8)
PSA SERPL-MCNC: 0.56 UG/L (ref 0–4.5)
RBC # BLD AUTO: 4.51 10E6/UL (ref 4.4–5.9)
SODIUM SERPL-SCNC: 137 MMOL/L (ref 136–145)
WBC # BLD AUTO: 12 10E3/UL (ref 4–11)

## 2022-02-16 PROCEDURE — 93010 ELECTROCARDIOGRAM REPORT: CPT | Performed by: INTERNAL MEDICINE

## 2022-02-16 PROCEDURE — 83690 ASSAY OF LIPASE: CPT | Performed by: INTERNAL MEDICINE

## 2022-02-16 PROCEDURE — G0103 PSA SCREENING: HCPCS | Performed by: INTERNAL MEDICINE

## 2022-02-16 PROCEDURE — 80053 COMPREHEN METABOLIC PANEL: CPT | Performed by: INTERNAL MEDICINE

## 2022-02-16 PROCEDURE — 36415 COLL VENOUS BLD VENIPUNCTURE: CPT | Performed by: INTERNAL MEDICINE

## 2022-02-16 PROCEDURE — 86803 HEPATITIS C AB TEST: CPT | Performed by: INTERNAL MEDICINE

## 2022-02-16 PROCEDURE — 93005 ELECTROCARDIOGRAM TRACING: CPT | Performed by: INTERNAL MEDICINE

## 2022-02-16 PROCEDURE — 85025 COMPLETE CBC W/AUTO DIFF WBC: CPT | Performed by: INTERNAL MEDICINE

## 2022-02-16 PROCEDURE — 99214 OFFICE O/P EST MOD 30 MIN: CPT | Performed by: INTERNAL MEDICINE

## 2022-02-16 NOTE — PATIENT INSTRUCTIONS
Schedule a heart stress test to further evaluate your chest pain.    I suspect your chest symptoms are likely GI related and you should proceed with the endoscopy with gastroenterology as planned.  Continue on your Nexium.    Report any new or worsening symptoms, otherwise I will see you next week for physical.    Try to eat food on a more regular basis.  Not eating can increase the risk of bile reflux discomfort in your stomach or chest area.  Not eating can also increase risk of a migraine.    Look in your [facility] computer portal for results of the tests ordered today.

## 2022-02-16 NOTE — PROGRESS NOTES
[facility] clinic Follow Up Note    Elvin Castro   66 year old male    Date of Visit: 2/16/2022    Chief Complaint   Patient presents with     Headache     Migrane x3 Days     Subjective  Sachin is a 66-year-old male, last saw me in 2019 for a physical.    He has a history of Crohn's disease with history of bowel resections and 86 and 93, terminal ileum resected.  January 2020 colonoscopy with 2 ileal strictures noted.  He has a diverting colostomy bag that is working well.  He has had some increased rectal pouch mucus discharge but not severe pouchitis type severe symptoms.    Currently on biologic infusion therapy, was on steroids from November until 1 month ago.    He is never smoked, no history of cardiac disease, normal blood pressure.  June 2021 normal chest x-ray.    EGD August 2021 showed lymphocytic esophagitis he did improve on Nexium after that.    Since August of last year he has been having a chest pressure feeling, feeling where somebody pushing on his chest.  He does not happen every day, least once a week.  When it does happen it will last all day.  It does seem to help to eat something but unclear.  Its not precipitated by activity, he is fairly active, doing house remodeling.  He does feel there is some mild increasing shortness of breath when he is having the pectoral discomfort pain but not prominent.    No new cough or fever or recent respiratory illness.    I did review labs from November 2021 at GI clinic with normal liver test and hemoglobin.  He has not had a recent Crohn's flare or GI bleeding.    Patient has a long history of migraine headaches any has a similar type headache but more severe over the past 3 days but does admit that he is not been eating regularly.  He has been skipping meals with being busy with his house remodeling work.  He takes an Excedrin Migraine and massages the back of his neck and it usually resolves.  He is significantly better today.  No visual  symptoms.  No nausea and vomiting.    No palpitations or increasing lower extremity edema.    Lives independently with wife.    He does occasionally get some hot flashes in the evening recently, which she reports is new.    I did review an echo from February 2020, normal echo.    Previous EKG with left anterior fascicular block.    PMHx:    Past Medical History:   Diagnosis Date     Bowel obstruction (H)      Crohn's colitis (H)      History of anesthesia complications     Slow to wake     PONV (postoperative nausea and vomiting)      Primary osteoarthritis of both hands 11/6/2018     PSHx:    Past Surgical History:   Procedure Laterality Date     ABDOMEN SURGERY      x3     CARDIAC SURGERY       COLON SURGERY       COLONOSCOPY       EYE SURGERY       INCISION AND DRAINAGE OF WOUND N/A 6/15/2020    Procedure: EXAM UNDER ANESTHESIA WITH DRAINAGE OF ABSCESS;  Surgeon: Kim Ornelas MD;  Location: [facility];  Service: General     Z PART REMOVAL COLON W COLOSTOMY      Description: Partial Colectomy With Colostomy;  Recorded: 05/10/2008;     Immunizations:   Immunization History   Administered Date(s) Administered     COVID-19,PF,Pfizer (12+ Yrs) 02/05/2022     DT (PEDS <7y) 01/01/1990, 07/05/2000     FLU 6-35 months 10/04/2011     Flu, Unspecified 10/15/2017     HepA, Unspecified 06/03/2008, 12/30/2008     HepA-Adult 06/03/2008, 12/30/2008     HepB, Unspecified 06/03/2008, 06/30/2008, 12/30/2008     HepB-Adult 06/03/2008, 06/30/2008, 12/30/2008     Influenza (H1N1) 12/12/2009     Influenza (IIV3) PF 10/27/2004, 10/06/2014     Influenza Quad, Recombinant, pf(RIV4) (Flublok) 10/02/2019, 10/02/2020     Influenza Vaccine IM > 6 months Valent IIV4 (Alfuria,Fluzone) 10/01/2014, 09/26/2016, 10/15/2017     Influenza,INJ,MDCK,PF,Quad >4yrs 10/15/2017, 10/01/2018     Pneumo Conj 13-V (2010&after) 05/02/2017     Pneumococcal 23 valent 06/03/2008, 07/05/2013, 05/30/2014, 04/30/2019     Pneumococcal, Unspecified  05/30/2014, 04/30/2019     Td,adult,historic,unspecified 06/03/2008     Tdap (Adacel,Boostrix) 06/03/2008, 11/14/2018, 08/20/2020       ROS A comprehensive review of systems was performed and was otherwise negative    Medications, allergies, and problem list were reviewed and updated    Exam  /71   Pulse 89   Wt 79.7 kg (175 lb 11.2 oz)   SpO2 98%   BMI 22.56 kg/m    Thin male.  Does not appear in acute distress.  Normal mentation.  Normal mobility.  No evidence of arthritis.  No JVD.  No jaundice.  No cervical or supraclavicular or axillary adenopathy.  Chest wall appears normal to inspection and palpation and there is no tenderness to palpation.  Breast exam was normal without mass.  Lungs are clear to auscultation with no crackles or wheezing or dullness.  Good respiratory excursion.  Heart is regular without murmur rub or gallop.  No ankle edema.  Abdomen is thin with ostomy bag in left lower quadrant.  There are some mild epigastric tenderness to palpation, patient does report that as a separate pain from his chest discomfort.    Patient did complain of some mild chest pressure discomfort during the visit today including when EKG was being done.    Assessment/Plan  1. Chest pain, unspecified type  I suspect this is GI related.  Has had some response to Nexium and eating.  It may be a bile reflux condition.  He does have a history of lymphocytic esophagitis noted on EGD last August.  He could have a candidal esophagitis although not having specific pain on swallowing.    Patient would likely benefit from an EGD which was planned, but had to be rescheduled with his current chest pain and needing further evaluation with that.    He will follow-up with Dr. Baum in GI to proceed with upper endoscopy when that can be arranged.    Low suspicion for cardiac ischemia but rule out with cardiac stress test.    I did personally review the EKG today and there is no ischemic changes, just baseline left anterior  fascicular block.  He was having symptoms during EKG.  - EKG 12-lead, tracing only  - NM Lexiscan stress test; Future    2. Screening for prostate cancer  Lab draw today in preparation for physical next week  - Prostate Specific Antigen Screen    3. Epigastric pain  Mild epigastric pain to palpation consistent with his chronic Crohn's and extensive scar tissue from previous abdominal surgeries.  He has not been having increasing epigastric pain complaints.  Chronic issue.    Continue Nexium.  Follow-up GI.  - Comprehensive metabolic panel  - CBC with Platelets & Differential  - Lipase    4. Crohn's disease of both small and large intestine with intestinal obstruction (H)  He is due for colonoscopy, which will be rescheduled to his gastroenterologist.    History of diverting colostomy and some pouch mucus discharge.  He can discuss that with gastroenterology.    Continue infusion therapy through GI.    No arthritis complaints.    5. Need for hepatitis C screening test  Patient did agree to hepatitis C screening, although it does appear he was - May 2020.  History of hep B vaccine.  - Hepatitis C antibody    History of migraine headaches, somewhat worse over the past 3 days but now improving.  Patient does report that these are consistent with her usual migraine headaches but somewhat worse recently.  We will follow-up next week for physical exam.  Can evaluate further if there is changing or worsening headaches.  I did remind patient to make sure he is eating on a regular basis, do not skip meals, as that can lead to migraine headaches.    No history of alcohol or sleep apnea      Return in 8 days (on 2/24/2022) for Routine preventive.   Patient Instructions   Schedule a heart stress test to further evaluate your chest pain.    I suspect your chest symptoms are likely GI related and you should proceed with the endoscopy with gastroenterology as planned.  Continue on your Nexium.    Report any new or worsening  symptoms, otherwise I will see you next week for physical.    Try to eat food on a more regular basis.  Not eating can increase the risk of bile reflux discomfort in your stomach or chest area.  Not eating can also increase risk of a migraine.    Look in your [employer] computer portal for results of the tests ordered today.    Jorge Mooney MD, MD        Current Outpatient Medications   Medication Sig Dispense Refill     ascorbic acid, vitamin C, (VITAMIN C) 1000 MG tablet [ASCORBIC ACID, VITAMIN C, (VITAMIN C) 1000 MG TABLET] Take 1,000 mg by mouth daily.       calcium citrate (CALCITRATE) 200 mg (950 mg) tablet [CALCIUM CITRATE (CALCITRATE) 200 MG (950 MG) TABLET] Take 2 tablets by mouth 2 (two) times a day.       cholecalciferol, vitamin D3, 1,000 unit tablet [CHOLECALCIFEROL, VITAMIN D3, 1,000 UNIT TABLET] Take 2,000 Units by mouth daily.        cyanocobalamin, vitamin B-12, 2,500 mcg Tab [CYANOCOBALAMIN, VITAMIN B-12, 2,500 MCG TAB] Take 1,250 mcg by mouth daily.       esomeprazole (NEXIUM) 20 MG capsule [ESOMEPRAZOLE (NEXIUM) 20 MG CAPSULE] Take 2 capsules by mouth.       MEDICATION CANNOT BE REORDERED - PLEASE MANUALLY REORDER AND DISCONTINUE THE OLD ORDER [INFLIXIMAB (REMICADE) 100 MG INJECTION] Administer Remicade 10mg/kg every six weeks, infuse by IV route       benzonatate (TESSALON) 200 MG capsule [BENZONATATE (TESSALON) 200 MG CAPSULE] Take 1 capsule (200 mg total) by mouth 3 (three) times a day as needed for cough. (Patient not taking: Reported on 2/16/2022) 21 capsule 0     Allergies   Allergen Reactions     Mercaptopurine Muscle Pain (Myalgia)     Extreme muscle weakness, required admission to hospital.      Social History     Tobacco Use     Smoking status: Never Smoker     Smokeless tobacco: Never Used   Substance Use Topics     Alcohol use: Yes     Alcohol/week: 1.0 standard drink     Comment: Alcoholic Drinks/day: once a month     Drug use: No

## 2022-02-17 ENCOUNTER — TELEPHONE (OUTPATIENT)
Dept: CARDIOLOGY | Facility: CLINIC | Age: 67
End: 2022-02-17
Payer: COMMERCIAL

## 2022-02-17 LAB
ATRIAL RATE - MUSE: 65 BPM
DIASTOLIC BLOOD PRESSURE - MUSE: NORMAL MMHG
HCV AB SERPL QL IA: NEGATIVE
INTERPRETATION ECG - MUSE: NORMAL
P AXIS - MUSE: 60 DEGREES
PR INTERVAL - MUSE: 182 MS
QRS DURATION - MUSE: 108 MS
QT - MUSE: 398 MS
QTC - MUSE: 413 MS
R AXIS - MUSE: -51 DEGREES
SYSTOLIC BLOOD PRESSURE - MUSE: NORMAL MMHG
T AXIS - MUSE: 53 DEGREES
VENTRICULAR RATE- MUSE: 65 BPM

## 2022-02-17 NOTE — TELEPHONE ENCOUNTER
[facility] Call Center    Phone Message    May a detailed message be left on voicemail: yes     Reason for Call: Pt is calling to schedule NM stress test at     Action Taken: TE sent to clinic    Travel Screening: Not Applicable     Val Ricci on 2/17/2022 at 8:18 AM

## 2022-02-21 ASSESSMENT — ACTIVITIES OF DAILY LIVING (ADL): CURRENT_FUNCTION: NO ASSISTANCE NEEDED

## 2022-02-23 ENCOUNTER — HOSPITAL ENCOUNTER (OUTPATIENT)
Dept: NUCLEAR MEDICINE | Facility: CLINIC | Age: 67
End: 2022-02-23
Attending: INTERNAL MEDICINE
Payer: COMMERCIAL

## 2022-02-23 ENCOUNTER — HOSPITAL ENCOUNTER (OUTPATIENT)
Dept: CARDIOLOGY | Facility: CLINIC | Age: 67
End: 2022-02-23
Attending: INTERNAL MEDICINE
Payer: COMMERCIAL

## 2022-02-23 DIAGNOSIS — R07.9 CHEST PAIN, UNSPECIFIED TYPE: ICD-10-CM

## 2022-02-23 LAB
CV BLOOD PRESSURE: 62 MMHG
CV STRESS CURRENT BP HE: NORMAL
CV STRESS CURRENT HR HE: 101
CV STRESS CURRENT HR HE: 102
CV STRESS CURRENT HR HE: 103
CV STRESS CURRENT HR HE: 104
CV STRESS CURRENT HR HE: 113
CV STRESS CURRENT HR HE: 115
CV STRESS CURRENT HR HE: 118
CV STRESS CURRENT HR HE: 119
CV STRESS CURRENT HR HE: 120
CV STRESS CURRENT HR HE: 86
CV STRESS CURRENT HR HE: 87
CV STRESS CURRENT HR HE: 90
CV STRESS CURRENT HR HE: 95
CV STRESS CURRENT HR HE: 95
CV STRESS CURRENT HR HE: 97
CV STRESS CURRENT HR HE: 97
CV STRESS CURRENT HR HE: 98
CV STRESS CURRENT HR HE: 99
CV STRESS DEVIATION TIME HE: NORMAL
CV STRESS ECHO PERCENT HR HE: NORMAL
CV STRESS EXERCISE STAGE HE: NORMAL
CV STRESS FINAL RESTING BP HE: NORMAL
CV STRESS FINAL RESTING HR HE: 97
CV STRESS MAX HR HE: 120
CV STRESS MAX TREADMILL GRADE HE: 0
CV STRESS MAX TREADMILL SPEED HE: 0
CV STRESS PEAK DIA BP HE: NORMAL
CV STRESS PEAK SYS BP HE: NORMAL
CV STRESS PHASE HE: NORMAL
CV STRESS PROTOCOL HE: NORMAL
CV STRESS RESTING PT POSITION HE: NORMAL
CV STRESS ST DEVIATION AMOUNT HE: NORMAL
CV STRESS ST DEVIATION ELEVATION HE: NORMAL
CV STRESS ST EVELATION AMOUNT HE: NORMAL
CV STRESS TEST TYPE HE: NORMAL
CV STRESS TOTAL STAGE TIME MIN 1 HE: NORMAL
RATE PRESSURE PRODUCT: NORMAL
STRESS ECHO BASELINE DIASTOLIC HE: 85
STRESS ECHO BASELINE HR: 80
STRESS ECHO BASELINE SYSTOLIC BP: 160
STRESS ECHO CALCULATED PERCENT HR: 78 %
STRESS ECHO LAST STRESS DIASTOLIC BP: 84
STRESS ECHO LAST STRESS HR: 102
STRESS ECHO LAST STRESS SYSTOLIC BP: 157
STRESS ECHO TARGET HR: 154

## 2022-02-23 PROCEDURE — A9500 TC99M SESTAMIBI: HCPCS | Performed by: INTERNAL MEDICINE

## 2022-02-23 PROCEDURE — 93016 CV STRESS TEST SUPVJ ONLY: CPT | Performed by: INTERNAL MEDICINE

## 2022-02-23 PROCEDURE — 343N000001 HC RX 343: Performed by: INTERNAL MEDICINE

## 2022-02-23 PROCEDURE — 93017 CV STRESS TEST TRACING ONLY: CPT | Performed by: INTERNAL MEDICINE

## 2022-02-23 PROCEDURE — 93018 CV STRESS TEST I&R ONLY: CPT | Performed by: INTERNAL MEDICINE

## 2022-02-23 PROCEDURE — 78452 HT MUSCLE IMAGE SPECT MULT: CPT

## 2022-02-23 PROCEDURE — 78452 HT MUSCLE IMAGE SPECT MULT: CPT | Mod: 26 | Performed by: INTERNAL MEDICINE

## 2022-02-23 RX ORDER — AMINOPHYLLINE 25 MG/ML
50 INJECTION, SOLUTION INTRAVENOUS
Status: DISCONTINUED | OUTPATIENT
Start: 2022-02-23 | End: 2022-02-23 | Stop reason: HOSPADM

## 2022-02-23 RX ORDER — CAFFEINE 200 MG
200 TABLET ORAL
Status: DISCONTINUED | OUTPATIENT
Start: 2022-02-23 | End: 2022-02-23 | Stop reason: HOSPADM

## 2022-02-23 RX ORDER — REGADENOSON 0.08 MG/ML
0.4 INJECTION, SOLUTION INTRAVENOUS ONCE
Status: COMPLETED | OUTPATIENT
Start: 2022-02-23 | End: 2022-02-23

## 2022-02-23 RX ORDER — ALBUTEROL SULFATE 0.83 MG/ML
2.5 SOLUTION RESPIRATORY (INHALATION)
Status: DISCONTINUED | OUTPATIENT
Start: 2022-02-23 | End: 2022-02-23 | Stop reason: HOSPADM

## 2022-02-23 RX ORDER — CAFFEINE CITRATE 20 MG/ML
60 SOLUTION INTRAVENOUS
Status: DISCONTINUED | OUTPATIENT
Start: 2022-02-23 | End: 2022-02-23 | Stop reason: HOSPADM

## 2022-02-23 RX ADMIN — Medication 8.39 MILLICURIE: at 08:03

## 2022-02-23 RX ADMIN — Medication 32.7 MILLICURIE: at 09:40

## 2022-02-23 RX ADMIN — REGADENOSON 0.4 MG: 0.08 INJECTION, SOLUTION INTRAVENOUS at 08:31

## 2022-02-24 ENCOUNTER — OFFICE VISIT (OUTPATIENT)
Dept: INTERNAL MEDICINE | Facility: CLINIC | Age: 67
End: 2022-02-24
Payer: COMMERCIAL

## 2022-02-24 VITALS
WEIGHT: 177 LBS | SYSTOLIC BLOOD PRESSURE: 132 MMHG | OXYGEN SATURATION: 100 % | BODY MASS INDEX: 22.72 KG/M2 | HEIGHT: 74 IN | HEART RATE: 79 BPM | DIASTOLIC BLOOD PRESSURE: 78 MMHG

## 2022-02-24 DIAGNOSIS — R59.0 AXILLARY LYMPHADENOPATHY: ICD-10-CM

## 2022-02-24 DIAGNOSIS — K50.812 CROHN'S DISEASE OF BOTH SMALL AND LARGE INTESTINE WITH INTESTINAL OBSTRUCTION (H): ICD-10-CM

## 2022-02-24 DIAGNOSIS — G89.29 CHRONIC NONINTRACTABLE HEADACHE, UNSPECIFIED HEADACHE TYPE: ICD-10-CM

## 2022-02-24 DIAGNOSIS — R06.02 SHORTNESS OF BREATH: ICD-10-CM

## 2022-02-24 DIAGNOSIS — Z00.00 ENCOUNTER FOR WELLNESS EXAMINATION IN ADULT: Primary | ICD-10-CM

## 2022-02-24 DIAGNOSIS — R51.9 CHRONIC NONINTRACTABLE HEADACHE, UNSPECIFIED HEADACHE TYPE: ICD-10-CM

## 2022-02-24 DIAGNOSIS — R07.9 CHEST PAIN, UNSPECIFIED TYPE: ICD-10-CM

## 2022-02-24 PROCEDURE — G0438 PPPS, INITIAL VISIT: HCPCS | Performed by: INTERNAL MEDICINE

## 2022-02-24 PROCEDURE — 99214 OFFICE O/P EST MOD 30 MIN: CPT | Mod: 25 | Performed by: INTERNAL MEDICINE

## 2022-02-24 RX ORDER — MULTIPLE VITAMINS W/ MINERALS TAB 9MG-400MCG
1 TAB ORAL DAILY
COMMUNITY

## 2022-02-24 ASSESSMENT — ACTIVITIES OF DAILY LIVING (ADL): CURRENT_FUNCTION: NO ASSISTANCE NEEDED

## 2022-02-24 NOTE — PROGRESS NOTES
SUBJECTIVE:   Elvin Castro is a 66 year old male who presents for Preventive Visit.  Longstanding Crohn's.  Small bowel resection 86-93.  Terminal ileum resected.  History of colostomy.  Has pouch with atrophy.  January 2020 colonoscopy with 2 ileal strictures.    Patient did set up an appointment with gastroenterology on March 3 as he was unable to get in with Dr. Baum until April 5.  He does have the April 5 EGD and colonoscopy scheduled.    Patient has been having a nonspecific chest pain discomfort feeling since August of last year.  Does not happen every day but has been increasing recently.  Not clearly exertional.  He does state some mild increasing shortness of breath with the discomfort.    He underwent a stress test yesterday.  It was negative for ischemia.  There was significant diaphragmatic shadowing, however.    Chest x-ray June 2021 was normal.  No new cough although he does have some mild intermittent dry cough.    He was recently gotten off steroids November of last year.  He is on a biological agent now and Nexium.    He was diagnosed with lymphocytic esophagitis August 2021.    He is not having severe pain with swallowing.  Eating does not help his chest symptoms.    He is never smoked.  No significant alcohol.    He continues have headaches, he states has been having increasing headaches and switching to the generic biologic.  He does have a long history of migraines and does take Excedrin.  He does not have a headache today.  He did see the eye doctor 2 weeks ago and reports a normal exam.  He is not had any recent head imaging.  No ear pain or sinusitis symptoms, no new vertigo or other neurologic changes.  No recent head trauma.    No change in the stool output from his ostomy.  Has had some dermatitis around the ostomy but is under better control recently and is working with dermatology for topical creams and working with a different taper now.  He does not feel he needs to see an ostomy  "nurse currently.    No urinary complaints.  PSA level was 0.56 this month.    Patient did have labs done I did review labs with normal kidney and liver tests.  Just borderline bilirubin level.  White count was 12 otherwise normal blood counts.  Hepatitis C screening was negative.    Echo 2019 LDL 59 and HDL 48 without medications.    He is fairly active with walking in the house, doing some house remodeling.    No arthritis complaints.  He does complain of some brittle nails.  He does have some nail clubbing.    Patient has been advised of split billing requirements and indicates understanding: Yes  Are you in the first 12 months of your Medicare coverage?  No    Healthy Habits:     In general, how would you rate your overall health?  Fair    Frequency of exercise:  1 day/week    Duration of exercise:  15-30 minutes    Do you usually eat at least 4 servings of fruit and vegetables a day, include whole grains    & fiber and avoid regularly eating high fat or \"junk\" foods?  Yes    Taking medications regularly:  Yes    Medication side effects:  Not applicable    Ability to successfully perform activities of daily living:  No assistance needed    Home Safety:  No safety concerns identified    Hearing Impairment:  Difficulty following a conversation in a noisy restaurant or crowded room, find that men's voices are easier to understand than woman's and difficulty understanding soft or whispered speech    In the past 6 months, have you been bothered by leaking of urine?  No    In general, how would you rate your overall mental or emotional health?  Good      PHQ-2 Total Score: 0    Additional concerns today:  No    Do you feel safe in your environment? Yes    Have you ever done Advance Care Planning? (For example, a Health Directive, POLST, or a discussion with a medical provider or your loved ones about your wishes): Yes, advance care planning is on file.      Fall risk  Fallen 2 or more times in the past year?: No  Any " fall with injury in the past year?: No  click delete button to remove this line now  Cognitive Screening   1) Repeat 3 items (Leader, Season, Table)    2) Clock draw: NORMAL  3) 3 item recall: Recalls 3 objects  Results: 3 items recalled: COGNITIVE IMPAIRMENT LESS LIKELY    Mini-CogTM Copyright S Marya. Licensed by the author for use in [facility]; reprinted with permission (wai@[facility]). All rights reserved.      Do you have sleep apnea, excessive snoring or daytime drowsiness?: no    Reviewed and updated as needed this visit by clinical staff   Tobacco  Allergies  Meds              Reviewed and updated as needed this visit by Provider     Meds             Social History     Tobacco Use     Smoking status: Never Smoker     Smokeless tobacco: Never Used   Substance Use Topics     Alcohol use: Yes     Alcohol/week: 1.0 standard drink     Comment: Alcoholic Drinks/day: once a month     If you drink alcohol do you typically have >3 drinks per day or >7 drinks per week? No    Alcohol Use 2/24/2022   Prescreen: >3 drinks/day or >7 drinks/week? -   Prescreen: >3 drinks/day or >7 drinks/week? No   No flowsheet data found.      Current providers sharing in care for this patient include:   Patient Care Team:  Jorge Mooney MD as PCP - General (Internal Medicine)  Suman Lechuga MD as Assigned PCP    The following health maintenance items are reviewed in Epic and correct as of today:  Health Maintenance Due   Topic Date Due     LIPID  Never done     ZOSTER IMMUNIZATION (1 of 2) Never done     COLORECTAL CANCER SCREENING  01/08/2022     Labs reviewed in EPIC  Patient Active Problem List   Diagnosis     Immunodeficiency (H)     Crohn's disease of both small and large intestine with intestinal obstruction (H)     Vitamin B12 deficiency (non anemic)     Dermatitis     Primary osteoarthritis of both hands     LAFB (left anterior fascicular block)     Osteopenia of multiple sites     Abscess of buttock, right      Bilateral leg edema     Past Surgical History:   Procedure Laterality Date     ABDOMEN SURGERY      x3     CARDIAC SURGERY       COLON SURGERY       COLONOSCOPY       EYE SURGERY       INCISION AND DRAINAGE OF WOUND N/A 6/15/2020    Procedure: EXAM UNDER ANESTHESIA WITH DRAINAGE OF ABSCESS;  Surgeon: Kim Ornelas MD;  Location: [facility];  Service: General     [facility] PART REMOVAL COLON W COLOSTOMY      Description: Partial Colectomy With Colostomy;  Recorded: 05/10/2008;       Social History     Tobacco Use     Smoking status: Never Smoker     Smokeless tobacco: Never Used   Substance Use Topics     Alcohol use: Yes     Alcohol/week: 1.0 standard drink     Comment: Alcoholic Drinks/day: once a month     No family history on file.      Current Outpatient Medications   Medication Sig Dispense Refill     ascorbic acid, vitamin C, (VITAMIN C) 1000 MG tablet [ASCORBIC ACID, VITAMIN C, (VITAMIN C) 1000 MG TABLET] Take 1,000 mg by mouth daily.       calcium citrate (CALCITRATE) 200 mg (950 mg) tablet [CALCIUM CITRATE (CALCITRATE) 200 MG (950 MG) TABLET] Take 2 tablets by mouth 2 (two) times a day.       cholecalciferol, vitamin D3, 1,000 unit tablet [CHOLECALCIFEROL, VITAMIN D3, 1,000 UNIT TABLET] Take 2,000 Units by mouth daily.        cyanocobalamin, vitamin B-12, 2,500 mcg Tab [CYANOCOBALAMIN, VITAMIN B-12, 2,500 MCG TAB] Take 1,250 mcg by mouth daily.       esomeprazole (NEXIUM) 20 MG capsule [ESOMEPRAZOLE (NEXIUM) 20 MG CAPSULE] Take 2 capsules by mouth.       MEDICATION CANNOT BE REORDERED - PLEASE MANUALLY REORDER AND DISCONTINUE THE OLD ORDER [INFLIXIMAB (REMICADE) 100 MG INJECTION] Administer Remicade 10mg/kg every six weeks, infuse by IV route       multivitamin w/minerals (MULTI-VITAMIN) tablet Take 1 tablet by mouth daily       Allergies   Allergen Reactions     Mercaptopurine Muscle Pain (Myalgia)     Extreme muscle weakness, required admission to hospital.          Review of Systems  Constitutional,  "HEENT, cardiovascular, pulmonary, GI, , musculoskeletal, neuro, skin, endocrine and psych systems are negative, except as otherwise noted.    OBJECTIVE:   /78 (BP Location: Right arm, Patient Position: Sitting, Cuff Size: Adult Regular)   Pulse 79   Ht 1.88 m (6' 2\")   Wt 80.3 kg (177 lb)   SpO2 100%   BMI 22.73 kg/m   Estimated body mass index is 22.73 kg/m  as calculated from the following:    Height as of this encounter: 1.88 m (6' 2\").    Weight as of this encounter: 80.3 kg (177 lb).  Physical Exam  Alert and oriented x3.  Good mood and affect.  Clock face drawing and word recall normal.  Mobility exam is normal.  Patient is thin, but does not have diffuse severe muscle atrophy.  Irises equal and reactive.  Extraocular muscles intact.  No jaundice or conjunctivitis.  No cervical or supraclavicular adenopathy.    He does have 1/2 cm mobile smooth nontender left axillary lymph node, solitary.  No other lymphadenopathy.    No JVD and no carotid bruits.  No thyromegaly or nodularity.    Lungs are clear to auscultation with good respiratory excursion.  No wheezing or crackles or dullness.    Heart is regular without murmur rub or gallop.    Patient is thin without significant tenderness.  Somewhat firm upper abdomen consistent with previous surgery.  Ostomy in the left lower quadrant with some dermatitis around that but not significantly active.  No cellulitis.  No guarding or rebound of the abdomen.  There is no ankle edema.  +1 pedal pulses.  Feet in good condition.  ASSESSMENT / PLAN:   (Z00.00) Encounter for wellness examination in adult  (primary encounter diagnosis)  Comment: His main health maintenance issue is the Crohn's disease.  He will manage that through GI.  Plan: REVIEW OF HEALTH MAINTENANCE PROTOCOL ORDERS,         Full Code        Up-to-date on immunizations including the fourth shot booster.    PSA level was normal this month.  No new urinary symptoms.    His colonoscopy and EGD is " scheduled for April 5 of this year.    I did recommend improving his daily exercise and activity, especially in the winter.  He has been quite of house remodeling work now however.    Just saw ophthalmology 2 weeks ago without issues.    (Z13.220) Screening for hyperlipidemia  Comment: Did not screen for hyperlipidemia.  Patient is missing his ileum and will have low cholesterol.  2019 LDL 59 and HDL 48.  No role for statin.  Plan:     (Z12.11) Screen for colon cancer  Comment: Colonoscopy through gastroenterology, scheduled for April 5  Plan:     (K50.812) Crohn's disease of both small and large intestine with intestinal obstruction (H)  Comment: Managed by GI.  On biological agent.  Off steroids since November.  On Nexium.  Plan: EGD and colonoscopy scheduled for April 5, 2022.    He does have another GI appointment on March 3 discuss possibly earlier evaluation.    (R07.9) Chest pain, unspecified type  Comment: Unclear etiology.  Stress test was negative yesterday.  I suspect this is esophagitis or Crohn's associated symptoms.    He did have significant diaphragmatic shadowing on recent heart stress test.  He does have a left axillary lymph node.  I suspect lymph node is associated with his Crohn's inflammatory disease, does not appear to be active.    With his nonspecific chest symptoms and mild shortness of breath I will have him undergo a chest CT scan  Plan:     (R06.02) Shortness of breath  Comment: As above  Plan: CT Chest w/o Contrast        He has not had lower extremity edema or episodes of inactivity.  No history of DVT.  Not suspicious for PE at this time.    (R59.0) Axillary lymphadenopathy  Comment: Likely related to previous Crohn's inflammation.  Does not appear active.  Plan: CT Chest w/o Contrast            (R51.9,  G89.29) Chronic nonintractable headache, unspecified headache type  Comment: Chronic headaches, possibly worsened with a new generic biologic agent he is using for Crohn's.    He did  "want to rule out structural abnormality or other etiology for his headaches.  Plan: MR Brain w/o Contrast              Patient has been advised of split billing requirements and indicates understanding: No    COUNSELING:  Reviewed preventive health counseling, as reflected in patient instructions       Regular exercise       Healthy diet/nutrition       Vision screening       Colon cancer screening       Prostate cancer screening    Estimated body mass index is 22.73 kg/m  as calculated from the following:    Height as of this encounter: 1.88 m (6' 2\").    Weight as of this encounter: 80.3 kg (177 lb).    Weight management plan noted, stable and monitoring    He reports that he has never smoked. He has never used smokeless tobacco.      Appropriate preventive services were discussed with this patient, including applicable screening as appropriate for cardiovascular disease, diabetes, osteopenia/osteoporosis, and glaucoma.  As appropriate for age/gender, discussed screening for colorectal cancer, prostate cancer, breast cancer, and cervical cancer. Checklist reviewing preventive services available has been given to the patient.    Reviewed patients plan of care and provided an AVS. The Basic Care Plan (routine screening as documented in Health Maintenance) for Elvin meets the Care Plan requirement. This Care Plan has been established and reviewed with the Patient.    Counseling Resources:  ATP IV Guidelines  Pooled Cohorts Equation Calculator  Breast Cancer Risk Calculator  Breast Cancer: Medication to Reduce Risk  FRAX Risk Assessment  ICSI Preventive Guidelines  Dietary Guidelines for Americans, 2010  USDA's MyPlate  ASA Prophylaxis  Lung CA Screening    Jorge Mooney MD  [facility]    Identified Health Risks:  "

## 2022-02-24 NOTE — PATIENT INSTRUCTIONS
Schedule a brain MRI for further evaluation of your headaches.    Schedule a chest CT scan for further evaluation of the chest discomfort and shortness of breath symptoms you are having.    Your chest symptoms are likely associated with your Crohn's and esophagitis.  Follow-up with gastroenterology as planned for further evaluation.    Contact me if any new or changing symptoms for further evaluation, otherwise follow-up in 1 year for physical exam.

## 2022-03-03 ENCOUNTER — TRANSFERRED RECORDS (OUTPATIENT)
Dept: HEALTH INFORMATION MANAGEMENT | Facility: CLINIC | Age: 67
End: 2022-03-03
Payer: COMMERCIAL

## 2022-03-07 ENCOUNTER — TELEPHONE (OUTPATIENT)
Dept: INTERNAL MEDICINE | Facility: CLINIC | Age: 67
End: 2022-03-07
Payer: COMMERCIAL

## 2022-03-07 DIAGNOSIS — K50.919 CROHN'S DISEASE WITH COMPLICATION, UNSPECIFIED GASTROINTESTINAL TRACT LOCATION (H): Primary | ICD-10-CM

## 2022-03-07 NOTE — TELEPHONE ENCOUNTER
Reason for call:  Other   Patient called regarding (reason for call):   Covid Antibody test orders    Additional comments:   Patient requesting Covid Antibody test orders.  Please call patient either way.    Phone number to reach patient:  Cell number on file:    Telephone Information:   Mobile [phone]       Best Time:  any    Can we leave a detailed message on this number?  YES    Travel screening: Not Applicable

## 2022-03-09 ENCOUNTER — HOSPITAL ENCOUNTER (OUTPATIENT)
Dept: CT IMAGING | Facility: CLINIC | Age: 67
End: 2022-03-09
Attending: INTERNAL MEDICINE
Payer: COMMERCIAL

## 2022-03-09 ENCOUNTER — LAB (OUTPATIENT)
Dept: LAB | Facility: CLINIC | Age: 67
End: 2022-03-09
Attending: INTERNAL MEDICINE
Payer: COMMERCIAL

## 2022-03-09 ENCOUNTER — HOSPITAL ENCOUNTER (OUTPATIENT)
Dept: MRI IMAGING | Facility: CLINIC | Age: 67
End: 2022-03-09
Attending: INTERNAL MEDICINE
Payer: COMMERCIAL

## 2022-03-09 DIAGNOSIS — R51.9 CHRONIC NONINTRACTABLE HEADACHE, UNSPECIFIED HEADACHE TYPE: ICD-10-CM

## 2022-03-09 DIAGNOSIS — R06.02 SHORTNESS OF BREATH: ICD-10-CM

## 2022-03-09 DIAGNOSIS — K50.919 CROHN'S DISEASE WITH COMPLICATION, UNSPECIFIED GASTROINTESTINAL TRACT LOCATION (H): ICD-10-CM

## 2022-03-09 DIAGNOSIS — G89.29 CHRONIC NONINTRACTABLE HEADACHE, UNSPECIFIED HEADACHE TYPE: ICD-10-CM

## 2022-03-09 DIAGNOSIS — R59.0 AXILLARY LYMPHADENOPATHY: ICD-10-CM

## 2022-03-09 PROCEDURE — 71250 CT THORAX DX C-: CPT

## 2022-03-09 PROCEDURE — 36415 COLL VENOUS BLD VENIPUNCTURE: CPT

## 2022-03-09 PROCEDURE — 99000 SPECIMEN HANDLING OFFICE-LAB: CPT

## 2022-03-09 PROCEDURE — 70551 MRI BRAIN STEM W/O DYE: CPT

## 2022-03-09 PROCEDURE — 86769 SARS-COV-2 COVID-19 ANTIBODY: CPT | Mod: 90

## 2022-03-10 ENCOUNTER — TRANSFERRED RECORDS (OUTPATIENT)
Dept: HEALTH INFORMATION MANAGEMENT | Facility: CLINIC | Age: 67
End: 2022-03-10
Payer: COMMERCIAL

## 2022-03-10 LAB
SARS-COV-2 AB SERPL IA-ACNC: >250 U/ML
SARS-COV-2 AB SERPL QL IA: POSITIVE

## 2022-03-16 ENCOUNTER — TRANSFERRED RECORDS (OUTPATIENT)
Dept: HEALTH INFORMATION MANAGEMENT | Facility: CLINIC | Age: 67
End: 2022-03-16
Payer: COMMERCIAL

## 2022-04-05 ENCOUNTER — TRANSFERRED RECORDS (OUTPATIENT)
Dept: HEALTH INFORMATION MANAGEMENT | Facility: CLINIC | Age: 67
End: 2022-04-05
Payer: COMMERCIAL

## 2022-04-20 ENCOUNTER — TELEPHONE (OUTPATIENT)
Dept: INTERNAL MEDICINE | Facility: CLINIC | Age: 67
End: 2022-04-20
Payer: COMMERCIAL

## 2022-04-20 ENCOUNTER — TELEPHONE (OUTPATIENT)
Dept: INFECTIOUS DISEASES | Facility: CLINIC | Age: 67
End: 2022-04-20
Payer: COMMERCIAL

## 2022-04-20 DIAGNOSIS — Z92.25 HISTORY OF IMMUNOSUPPRESSION THERAPY: Primary | ICD-10-CM

## 2022-04-20 NOTE — TELEPHONE ENCOUNTER
Authorizing: Jorge Mooney MD in [facility] INTERNAL MEDICINE     Referral: [id number] (Pending Review)       Expires: 4/20/2023 Priority: Routine: Next available opening   Diagnosis: History of immunosuppression therapy     Please review and advise on scheduling

## 2022-04-20 NOTE — TELEPHONE ENCOUNTER
Patient will need to make an appoint with the infectious disease clinic regarding the prophylactic treatment for COVID in regards to his immunosuppression.  I do not have the answer for him on that.    In regards to the results of his test, he should be able to see results on his MyChart.  If he has questions on those results, or wishes to discuss them further, he should schedule a virtual visit with me.

## 2022-04-21 ENCOUNTER — TRANSFERRED RECORDS (OUTPATIENT)
Dept: HEALTH INFORMATION MANAGEMENT | Facility: CLINIC | Age: 67
End: 2022-04-21
Payer: COMMERCIAL

## 2022-04-21 LAB
ALT SERPL-CCNC: 11 IU/L (ref 0–40)
AST SERPL-CCNC: 19 IU/L (ref 0–40)

## 2022-04-21 NOTE — TELEPHONE ENCOUNTER
Additional Information:  Questions on using Ebusheild prophylaxis, patient on Remicade

## 2022-05-03 ENCOUNTER — OFFICE VISIT (OUTPATIENT)
Dept: INFECTIOUS DISEASES | Facility: CLINIC | Age: 67
End: 2022-05-03
Payer: COMMERCIAL

## 2022-05-03 VITALS
HEART RATE: 76 BPM | TEMPERATURE: 97.8 F | BODY MASS INDEX: 23.37 KG/M2 | SYSTOLIC BLOOD PRESSURE: 128 MMHG | DIASTOLIC BLOOD PRESSURE: 78 MMHG | WEIGHT: 182 LBS

## 2022-05-03 DIAGNOSIS — D84.9 IMMUNOSUPPRESSION (H): Primary | ICD-10-CM

## 2022-05-03 DIAGNOSIS — Z92.25 HISTORY OF IMMUNOSUPPRESSION THERAPY: ICD-10-CM

## 2022-05-03 PROCEDURE — M0220 PR INJECTION TIXAGEVIMAB & CILGAVIMAB (EVUSHELD): HCPCS | Performed by: STUDENT IN AN ORGANIZED HEALTH CARE EDUCATION/TRAINING PROGRAM

## 2022-05-03 PROCEDURE — 96372 THER/PROPH/DIAG INJ SC/IM: CPT | Performed by: STUDENT IN AN ORGANIZED HEALTH CARE EDUCATION/TRAINING PROGRAM

## 2022-05-03 PROCEDURE — 99204 OFFICE O/P NEW MOD 45 MIN: CPT | Mod: 25 | Performed by: STUDENT IN AN ORGANIZED HEALTH CARE EDUCATION/TRAINING PROGRAM

## 2022-05-03 RX ORDER — MESALAMINE 1000 MG/1
SUPPOSITORY RECTAL
COMMUNITY
Start: 2022-04-25 | End: 2022-10-03

## 2022-05-03 RX ORDER — FLUTICASONE PROPIONATE 220 UG/1
AEROSOL, METERED RESPIRATORY (INHALATION)
COMMUNITY
Start: 2022-04-27 | End: 2022-10-03

## 2022-05-03 RX ORDER — INFLIXIMAB 100 MG/10ML
INJECTION, POWDER, LYOPHILIZED, FOR SOLUTION INTRAVENOUS
COMMUNITY
Start: 2022-03-07

## 2022-05-03 NOTE — PROGRESS NOTES
[facility] new patient note.    Name: Elvin Castro  :   1955  MRN:   [id number]  PCP:    Jorge Mooney  DOS:    22      CC: Preexposure prophylaxis with Evusheld     HPI/Interval History:  Elvin Castro is a 66 year old old male with the history of Crohn's disease status post abdominal surgeries, immunosuppressed with rituximab on 2022, currently on Remicade last dose on 2022.  He is in ID clinic referred by primary for Evusheld.  He is vaccinated against COVID-19 on 2021; 2021, 2021, and 2022.  As opposed to what is listed in his past surgical history, the patient report having never had any cardiac surgery.  He denies any diagnosis of COVID over the last 20 days.  No current symptoms of COVID infection.  No sick contact over the last 10 days.  No monoclonal antibody over the last 90 days.    ===========================  Past Medical History:  Past Medical History:   Diagnosis Date     Bowel obstruction (H)      Crohn's colitis (H)      History of anesthesia complications     Slow to wake     PONV (postoperative nausea and vomiting)      Primary osteoarthritis of both hands 2018       Past Surgical History:  Past Surgical History:   Procedure Laterality Date     ABDOMEN SURGERY      x3     CARDIAC SURGERY       COLON SURGERY       COLONOSCOPY       EYE SURGERY       INCISION AND DRAINAGE OF WOUND N/A 6/15/2020    Procedure: EXAM UNDER ANESTHESIA WITH DRAINAGE OF ABSCESS;  Surgeon: Kim Ornelas MD;  Location: [facility];  Service: General     ZZC PART REMOVAL COLON W COLOSTOMY      Description: Partial Colectomy With Colostomy;  Recorded: 05/10/2008;       Social History:  Social History     Socioeconomic History     Marital status:      Spouse name: Not on file     Number of children: Not on file     Years of education: Not on file     Highest education level: Not on file   Occupational History     Not on file   Tobacco Use     Smoking status:  Never Smoker     Smokeless tobacco: Never Used   Substance and Sexual Activity     Alcohol use: Yes     Alcohol/week: 1.0 standard drink     Comment: Alcoholic Drinks/day: once a month     Drug use: No     Sexual activity: Yes   Other Topics Concern     Not on file   Social History Narrative     Not on file     Social Determinants of Health     Financial Resource Strain: Not on file   Food Insecurity: Not on file   Transportation Needs: Not on file   Physical Activity: Not on file   Stress: Not on file   Social Connections: Not on file   Intimate Partner Violence: Not on file   Housing Stability: Not on file       Family Medical History:  Sister with MS    Allergies:     Allergies   Allergen Reactions     Mercaptopurine Muscle Pain (Myalgia)     Extreme muscle weakness, required admission to hospital.        Medications:  Current Outpatient Medications   Medication Sig Dispense Refill     esomeprazole (NEXIUM) 20 MG capsule [ESOMEPRAZOLE (NEXIUM) 20 MG CAPSULE] Take 2 capsules by mouth.       inFLIXimab (REMICADE) 100 MG injection Administer Remicade 10mg/kg every six weeks, infuse by IV route       ascorbic acid, vitamin C, (VITAMIN C) 1000 MG tablet [ASCORBIC ACID, VITAMIN C, (VITAMIN C) 1000 MG TABLET] Take 1,000 mg by mouth daily.       calcium citrate (CALCITRATE) 200 mg (950 mg) tablet [CALCIUM CITRATE (CALCITRATE) 200 MG (950 MG) TABLET] Take 2 tablets by mouth 2 (two) times a day. (Patient not taking: Reported on 5/3/2022)       cholecalciferol, vitamin D3, 1,000 unit tablet [CHOLECALCIFEROL, VITAMIN D3, 1,000 UNIT TABLET] Take 2,000 Units by mouth daily.  (Patient not taking: Reported on 5/3/2022)       cyanocobalamin, vitamin B-12, 2,500 mcg Tab [CYANOCOBALAMIN, VITAMIN B-12, 2,500 MCG TAB] Take 1,250 mcg by mouth daily. (Patient not taking: Reported on 5/3/2022)       FLOVENT  MCG/ACT inhaler FOR TREATMENT OF EOSINOPHILIC ESOPHAGITIS REMOVE THE SPACER. SWALLOW TWO PUFFS TWICE A DAY. DO NOT EAT OR  DRINK FOR ONE HALF HOUR AFTERWARDS       MEDICATION CANNOT BE REORDERED - PLEASE MANUALLY REORDER AND DISCONTINUE THE OLD ORDER [INFLIXIMAB (REMICADE) 100 MG INJECTION] Administer Remicade 10mg/kg every six weeks, infuse by IV route       mesalamine (CANASA) 1000 MG suppository INSERT One suppository RECTALLY AT BEDTIME OR TWICE DAILY AS NEEDED       multivitamin w/minerals (THERA-VIT-M) tablet Take 1 tablet by mouth daily (Patient not taking: Reported on 5/3/2022)         Immunizations:  Immunization History   Administered Date(s) Administered     COVID-19,PF,Pfizer (12+ Yrs) 02/07/2021, 02/28/2021, 08/25/2021     COVID-19,PF,Pfizer 12+ Yrs (2022 and After) 02/05/2022     DT (PEDS <7y) 01/01/1990, 07/05/2000     FLU 6-35 months 10/04/2011     Flu, Unspecified 10/15/2017     HepA, Unspecified 06/03/2008, 12/30/2008     HepA-Adult 06/03/2008, 12/30/2008     HepB, Unspecified 06/03/2008, 06/30/2008, 12/30/2008     HepB-Adult 06/03/2008, 06/30/2008, 12/30/2008     Influenza (H1N1) 12/12/2009     Influenza (IIV3) PF 10/27/2004, 10/06/2014     Influenza Quad, Recombinant, pf(RIV4) (Flublok) 10/02/2019, 10/02/2020     Influenza Vaccine IM > 6 months Valent IIV4 (Alfuria,Fluzone) 10/01/2014, 09/26/2016, 10/15/2017     Influenza, Quad, High Dose, Pf, 65yr+ (Fluzone HD) 09/27/2021     Influenza,INJ,MDCK,PF,Quad >4yrs 10/15/2017, 10/01/2018     Pneumo Conj 13-V (2010&after) 05/02/2017     Pneumococcal 23 valent 06/03/2008, 07/05/2013, 05/30/2014, 04/30/2019     Pneumococcal, Unspecified 05/30/2014, 04/30/2019     Td,adult,historic,unspecified 06/03/2008     Tdap (Adacel,Boostrix) 06/03/2008, 11/14/2018, 08/20/2020       ==================    Review of System:  12 points review of system is negative except for findings in the HPI.    Exam  VS: /78   Pulse 76   Temp 97.8  F (36.6  C)   Wt 82.6 kg (182 lb)   BMI 23.37 kg/m      Gen: Pleasant in no acute distress.  HEENT: NCAT. EOMI.  Neck: No LAD.  Lungs: Clear to  auscultation bilaterally with no crackles or wheezes.   Card: RRR. No RMG. Peripheral pulses present and symmetrical. No edema.   Abd: Soft NT ND. No hepatomegaly or splenomegaly.  Ext: No edema  Lymph: No cervical or supraclavicular adenopathy.  Skin: No rash  Neuro: Alert and oriented to place time and person. Cranial nerves grossly intact.     Labs:  Reviewed    Imaging:  Reviewed    Assessment:  Elvin Castro is a 66 year old old male immunocompromised from recent rituximab treatment and currently on Remicade for Crohn's disease.  Meets criteria for Evusheld EUA.   We discussed mode of action, potential adverse reactions on up-to-date.  Patient consented to injection of Evusheld.   We also discussed the fact that if new variant comes along, Evusheld may or may not provide exposure.  Patient understands it.     Recommendations:  Evusheld 300/300 today.       Juanjose Hurtado MD  [facility]  [facility]  Office Telephone [phone].  Fax [phone]  [facility] paging

## 2022-05-03 NOTE — PROGRESS NOTES
EVUSHELD Administration Note:  Elvin OLGUIN Tawnyaalfonso presents today for EVUSHELD.   present during visit today: Not Applicable.    Consent:   Informed Consent confirmed in chart, obtained on this date: 5/3/22- Verbal consent with the pt, signed with pt 5/3/22    Post Injection Assessment:   Patient tolerated injection without incident.      Patient was observed in the room for a minimum of 10 minutes after injection per standard, then remained in the buidling for a total 60 minute observation period.           Evusheld Consent     Providers believe the benefits of Evusheld outweigh the risks for all moderately to severely immunocompromised patients.      Benefits:   Evusheld is a synthetic antibody that provides protection against COVID-19 for 6 months and is recommended for patients that have a weakened immune system that may not be able to make antibodies themselves.     Risks:    There is a very small risk of allergic reactions and you should notify us if you have any symptoms of an allergic reaction after the injection. There were also some extremely rare cardiac events reported in the initial trials in people that already had heart disease, but experts do not think these were caused by the medication. We will observe you for any chest pain or trouble breathing after the injections and you can reach out to your provider if any of these symptoms develop.     Alternatives:   Vaccines to prevent COVID-19 are approved or available under Emergency Use Authorization. Use of EVUSHELD does not replace vaccination against COVID-19. You can continue to mask and isolate to avoid infections. It is your choice to receive or not receive EVUSHELD. Should you decide not to receive EVUSHELD, it will not change your standard medical care.     Patient does consent to the injection.     Informed patients/ family this drug has been issues a EUA (emergency use authorization). It is intended for pre-exposure prophylaxis for  prevention of coronavirus disease 2019 .Patient education provided, risk and benefits provided. All questions answered.     Common side effects: headache, fatigue and cough   Sever side effects: anaphylaxis, bleeding disorders and cardiovascular events (bleeding disorders and cardiovascular events seen with patients who have preexisting conditions)    Has the patient received the Covid vaccine in the last 14 days? No  Has the patient been Covid positive in the last 20 [20 days after symptom onset or positive test (whichever is earlier) without ongoing fever]?No  Exposed to someone who is/was SARS-CoV-2 (Covid 19) positive in the past 10 days? [Exposure is defined as being within 6 feet for cumulative 15 minutes over a 24-hour period]?No    *If patient answered yes to any of these questions, they would not qualify to Evusheld at this time. They can reach back out to specialty clinic once they can answer no to all questions.     *If patients answered no, ask if they verbal consent to receiving Evusheld.    Patient/Guardian has verbally consented to receive Evusheld and would like to proceeded with scheduling. Order for Evusheld entered.

## 2022-06-02 ENCOUNTER — TRANSFERRED RECORDS (OUTPATIENT)
Dept: HEALTH INFORMATION MANAGEMENT | Facility: CLINIC | Age: 67
End: 2022-06-02
Payer: COMMERCIAL

## 2022-06-07 ENCOUNTER — TRANSFERRED RECORDS (OUTPATIENT)
Dept: HEALTH INFORMATION MANAGEMENT | Facility: CLINIC | Age: 67
End: 2022-06-07
Payer: COMMERCIAL

## 2022-06-14 ENCOUNTER — HOSPITAL ENCOUNTER (OUTPATIENT)
Dept: CT IMAGING | Facility: CLINIC | Age: 67
Discharge: HOME | End: 2022-06-14
Attending: COLON & RECTAL SURGERY | Admitting: COLON & RECTAL SURGERY
Payer: COMMERCIAL

## 2022-06-14 DIAGNOSIS — K50.819 CROHN'S DISEASE OF BOTH SMALL AND LARGE INTESTINE WITH COMPLICATION (H): ICD-10-CM

## 2022-06-14 PROCEDURE — 74177 CT ABD & PELVIS W/CONTRAST: CPT

## 2022-06-14 PROCEDURE — 250N000011 HC RX IP 250 OP 636: Performed by: COLON & RECTAL SURGERY

## 2022-06-14 RX ORDER — IOPAMIDOL 755 MG/ML
100 INJECTION, SOLUTION INTRAVASCULAR ONCE
Status: COMPLETED | OUTPATIENT
Start: 2022-06-14 | End: 2022-06-14

## 2022-06-14 RX ADMIN — IOPAMIDOL 100 ML: 755 INJECTION, SOLUTION INTRAVENOUS at 16:44

## 2022-07-08 ENCOUNTER — TRANSFERRED RECORDS (OUTPATIENT)
Dept: HEALTH INFORMATION MANAGEMENT | Facility: CLINIC | Age: 67
End: 2022-07-08

## 2022-07-11 ENCOUNTER — TRANSFERRED RECORDS (OUTPATIENT)
Dept: HEALTH INFORMATION MANAGEMENT | Facility: CLINIC | Age: 67
End: 2022-07-11

## 2022-08-02 ENCOUNTER — MEDICAL CORRESPONDENCE (OUTPATIENT)
Dept: HEALTH INFORMATION MANAGEMENT | Facility: CLINIC | Age: 67
End: 2022-08-02

## 2022-08-03 NOTE — PROGRESS NOTES
IR Procedure Pre-call    Spoke with: Elvin  Arrival and procedure time: 8/5 0830 arrival for 1000 procedure  Patient has : Yes  Patient has someone to stay overnight:Yes If angiogram must have responsible adult for 24 hours.  Patient is taking blood thinners:No   Patient has H&P within 30 days:Yes 7/8/22 [place]  Patient has covid test:Yes Will take at home test and bring picture of results to appointment.  Patient NPO 8 hours prior: Yes    Pre-call completed.   August 3, 2022 10:44 AM  Florence Tate, RN

## 2022-08-05 ENCOUNTER — HOSPITAL ENCOUNTER (OUTPATIENT)
Dept: CT IMAGING | Facility: CLINIC | Age: 67
Discharge: HOME | End: 2022-08-05
Attending: COLON & RECTAL SURGERY | Admitting: COLON & RECTAL SURGERY
Payer: COMMERCIAL

## 2022-08-05 DIAGNOSIS — K50.819 CROHN'S DISEASE OF SMALL AND LARGE INTESTINES WITH COMPLICATION (H): ICD-10-CM

## 2022-08-05 PROCEDURE — 74177 CT ABD & PELVIS W/CONTRAST: CPT

## 2022-08-05 PROCEDURE — 250N000011 HC RX IP 250 OP 636: Performed by: COLON & RECTAL SURGERY

## 2022-08-05 RX ORDER — IOPAMIDOL 755 MG/ML
100 INJECTION, SOLUTION INTRAVASCULAR ONCE
Status: COMPLETED | OUTPATIENT
Start: 2022-08-05 | End: 2022-08-05

## 2022-08-05 RX ADMIN — IOPAMIDOL 100 ML: 755 INJECTION, SOLUTION INTRAVENOUS at 09:10

## 2022-08-12 ENCOUNTER — TRANSFERRED RECORDS (OUTPATIENT)
Dept: HEALTH INFORMATION MANAGEMENT | Facility: CLINIC | Age: 67
End: 2022-08-12

## 2022-08-25 ENCOUNTER — TRANSFERRED RECORDS (OUTPATIENT)
Dept: HEALTH INFORMATION MANAGEMENT | Facility: CLINIC | Age: 67
End: 2022-08-25

## 2022-09-07 ENCOUNTER — TRANSFERRED RECORDS (OUTPATIENT)
Dept: HEALTH INFORMATION MANAGEMENT | Facility: CLINIC | Age: 67
End: 2022-09-07

## 2022-09-09 ENCOUNTER — TRANSFERRED RECORDS (OUTPATIENT)
Dept: HEALTH INFORMATION MANAGEMENT | Facility: CLINIC | Age: 67
End: 2022-09-09

## 2022-09-09 ENCOUNTER — TELEPHONE (OUTPATIENT)
Dept: INFECTIOUS DISEASES | Facility: CLINIC | Age: 67
End: 2022-09-09

## 2022-09-09 ENCOUNTER — TELEPHONE (OUTPATIENT)
Dept: NURSING | Facility: CLINIC | Age: 67
End: 2022-09-09

## 2022-09-09 DIAGNOSIS — Z92.25 HISTORY OF IMMUNOSUPPRESSION THERAPY: Primary | ICD-10-CM

## 2022-09-09 DIAGNOSIS — M81.0 OSTEOPOROSIS WITHOUT CURRENT PATHOLOGICAL FRACTURE, UNSPECIFIED OSTEOPOROSIS TYPE: Primary | ICD-10-CM

## 2022-09-09 NOTE — TELEPHONE ENCOUNTER
Called patient and I relayed message. Gave patient the infectious disease number and name of provider. Patient will call that clinic.       Ember Cano RN

## 2022-09-09 NOTE — TELEPHONE ENCOUNTER
Telephone Call:     Pt is calling to ask if he will be eligable to received the Evusheld every 6 months?     Writer reviewed the following information about Evusheld with the patient from the following website:   [email or web address]/blog/new-covid-[email or web address]-therapies-emergency-use-authorization-fda    Additionally, patient is getting a booster vaccine at [facility] for COVID-19 this weekend.     Writer is routing this inquiry about Evusheld to patient's PCP and care team to follow up with patient on his question.    Terri Burrell RN  [facility] Nurse Advisor 10:45 AM 9/9/2022

## 2022-09-09 NOTE — TELEPHONE ENCOUNTER
It appears patient is already started on evusheld.  This clinic does not prescribe that.  He should continue to get that to infectious disease clinic.

## 2022-09-14 NOTE — TELEPHONE ENCOUNTER
I spoke to the linnea PARIKH to have the COVID booster and to schedule Evusheld # 2. Appt scheduled. The pt will contact his PCP for the vaccine.

## 2022-09-15 ENCOUNTER — IMMUNIZATION (OUTPATIENT)
Dept: NURSING | Facility: CLINIC | Age: 67
End: 2022-09-15
Payer: COMMERCIAL

## 2022-09-15 PROCEDURE — 91312 COVID-19,PF,PFIZER BOOSTER BIVALENT: CPT

## 2022-09-15 PROCEDURE — 0124A COVID-19,PF,PFIZER BOOSTER BIVALENT: CPT

## 2022-10-01 ENCOUNTER — NURSE TRIAGE (OUTPATIENT)
Dept: NURSING | Facility: CLINIC | Age: 67
End: 2022-10-01

## 2022-10-01 NOTE — TELEPHONE ENCOUNTER
Patient tested positive for covid.  He called and got a virtual appointment set up.  He states his wife just tested positive and called her health care provider ([facility]) and was prescribed paxlovid right away.    Patient states he is immunocompromised and says he wants the antiviral treatment now.  I explained how we have this set up is you need to go through a virtual visit first.  Patient asked if we can page his doctor to call him to prescribe it.  I said unfortunately I cannot do that.  He is not even on call.  Our primary care providers are not prescribing paxlovid.    Patient states he will continue to monitor and if it gets worse will go to the ED.    Cheryl Carvalho RN   10/01/22 5:12 PM  [facility] Nurse Advisor    Reason for Disposition    Health Information question, no triage required and triager able to answer question    Additional Information    Negative: [1] Caller is not with the adult (patient) AND [2] reporting urgent symptoms    Negative: Lab result questions    Negative: Medication questions    Negative: Caller can't be reached by phone    Negative: Caller has already spoken to PCP or another triager    Negative: RN needs further essential information from caller in order to complete triage    Negative: Requesting regular office appointment    Negative: [1] Caller requesting NON-URGENT health information AND [2] PCP's office is the best resource    Protocols used: INFORMATION ONLY CALL - NO TRIAGE[facility]

## 2022-10-01 NOTE — TELEPHONE ENCOUNTER
Is this a 2nd Level Triage? NO    Situation: Covid Positive    Background:   Pt reports he tested positive for Covid today, after symptoms started 9/28/2022.     Assessment:   Pt reports he tested positive today, after symptoms started 9/28/2022. Pt reports cough, and congestion, nasal congestion, headache, sore throat. Pt denies shortness of breath, difficulty breathing, chest tightness.      COVID-19 - HOW TO PROTECT OTHERS - WHEN YOU ARE SICK WITH COVID-19:  * STAY HOME A MINIMUM OF 5 DAYS: Home isolation is needed for at least 5 days after the symptoms started. Stay home from school or work if you are sick. Do NOT go to [employer] services,  centers, shopping, or other public places. Do NOT use public transportation (e.g., bus, taxis, ride-sharing). Do NOT allow any visitors to your home. Leave the house only if you need to seek urgent medical care.  * WEAR A MASK FOR 10 DAYS: Wear a well-fitted mask for 10 full days any time you are around others inside your home or in public.     GENERAL CARE ADVICE FOR COVID-19 SYMPTOMS:  * The symptoms are generally treated the same whether you have COVID-19, influenza or some other respiratory virus.  * Cough: Use cough drops.  * Feeling dehydrated: Drink extra liquids. If the air in your home is dry, use a humidifier.  * Fever: For fever over 101 F (38.3 C), take acetaminophen every 4 to 6 hours (Adults 650 mg) OR ibuprofen every 6 to 8 hours (Adults 400 mg). Before taking any medicine, read all the instructions on the package. Do not take aspirin unless your doctor has prescribed it for you.  * Muscle aches, headache, and other pains: Often this comes and goes with the fever. Take acetaminophen every 4 to 6 hours (Adults 650 mg) OR ibuprofen every 6 to 8 hours (Adults 400 mg). Before taking any medicine, read all the instructions on the package.  * Sore throat: Try throat lozenges, hard candy or warm chicken broth.    * POSITIVE VIRAL TEST: After a positive test,  repeat tests are generally not recommended for 90 days (3 months). Reason: Even after it is safe to stop isolation (usually 5 days), tests may stay positive. Further, re-infection appears to be rare during the initial 90 days after symptom onset of the preceding infection. However, if you have new symptoms of COVID-19 within 14 days of exposure to someone with COVID-19, you should stay home (isolate).      Protocol Recommended Disposition:   Home Care    Recommendation:   Per protocol pt should be able to treat this at home. Pt is interested in Covid treatment, so was transferred to the Covid Treatment scheduling line to schedule an appointment.  Pt was advised to:    CALL BACK IF:   * Fever over 103 F (39.4 C)  * Fever lasts over 3 days  * Fever returns after being gone for 24 hour  * Chest pain or difficulty breathing occurs  * You become worse    Pt verbalized understanding.     Imtiaz Valdes RN on 10/1/2022 at 11:38 AM    Caller Name (Patient: Elvin Castro)    Gather patient reported symptoms   Assessment   Current Symptoms at time of phone call, reported by patient: (if no symptoms, document No symptoms] Pt reports cough, and congestion, nasal congestion, headache, sore throat.   Date of Symptom(s) onset (if applicable) 9/28/2022     If at time of call, Patients symptoms hare worsened, the Patient should contact 911 or have someone drive them to Emergency Dept promptly:      If Patient calling 911, inform 911 personal that you have tested positive for the Coronavirus (COVID-19).  Place mask on and await 911 to arrive.    If Emergency Dept, If possible, please have another adult drive you to the Emergency Dept but you need to wear mask when in contact with other people.      Monoclonal Antibody Administration    You may be eligible to receive a new treatment with a monoclonal antibody for preventing hospitalization in patients at high risk for complications from COVID-19. This medication is still  experimental and available on a limited basis; it is given through an IV and must be given at an infusion center. Please note that not all people who are eligible will receive the medication since it is in limited supply.  Is the patient symptomatic and onset of symptoms within the last 7 days?  Yes  Is the patient interested in a visit with a provider to discuss treatment options?: Yes  Is the patient seen at [facility]?  No: Warm transfer caller to [phone] to be scheduled with a virtual urgent provider.  During transfer, instruct  on appropriate time frame for visit       How can I protect others?    These guidelines are for isolating before returning to work, school or .       If you DO have symptoms:  o Stay home and away from others  - For at least 5 days after your symptoms started, AND   - You are fever free for 24 hours (with no medicine that reduces fever), AND  - Your other symptoms are better.  o Wear a mask for 10 full days any time you are around others.    If you DON'T have symptoms:  o Stay at home and away from others for at least 5 days after your positive test.  o Wear a mask for 10 full days any time you are around others.    There may be different guidelines for healthcare facilities. Please check with the specific sites before arriving.     If you've been told by a doctor that you were severely ill with COVID-19 or are immunocompromised, you should isolate for at least 10 days.    You should not go back to work until you meet the guidelines above for ending your home isolation. You don't need to be retested for COVID-19 before going back to work--studies show that you won't spread the virus if it's been at least 10 days since your symptoms started (or 20 days, if you have a weak immune system).    Employers, schools, and daycares: This is an official notice for this person's medical guidelines for returning in-person. They must meet the above guidelines before  going back to work, school, or  in person.      Reason for Disposition    [1] COVID-19 diagnosed by positive lab test (e.g., PCR, rapid self-test kit) AND [2] mild symptoms (e.g., cough, fever, others) AND [3] no complications or SOB    Additional Information    Negative: SEVERE difficulty breathing (e.g., struggling for each breath, speaks in single words)    Negative: Difficult to awaken or acting confused (e.g., disoriented, slurred speech)    Negative: Bluish (or gray) lips or face now    Negative: Shock suspected (e.g., cold/pale/clammy skin, too weak to stand, low BP, rapid pulse)    Negative: Sounds like a life-threatening emergency to the triager    Negative: [1] Diagnosed or suspected COVID-19 AND [2] symptoms lasting 3 or more weeks    Negative: [1] COVID-19 exposure AND [2] no symptoms    Negative: COVID-19 vaccine reaction suspected (e.g., fever, headache, muscle aches) occurring 1 to 3 days after getting vaccine    Negative: COVID-19 vaccine, questions about    Negative: [1] Lives with someone known to have influenza (flu test positive) AND [2] flu-like symptoms (e.g., cough, runny nose, sore throat, SOB; with or without fever)    Negative: [1] Adult with possible COVID-19 symptoms AND [2] triager concerned about severity of symptoms or other causes    Negative: COVID-19 and breastfeeding, questions about    Negative: SEVERE or constant chest pain or pressure  (Exception: Mild central chest pain, present only when coughing.)    Negative: MODERATE difficulty breathing (e.g., speaks in phrases, SOB even at rest, pulse 100-120)    Negative: [1] Headache AND [2] stiff neck (can't touch chin to chest)    Negative: Oxygen level (e.g., pulse oximetry) 90 percent or lower    Negative: Chest pain or pressure    Negative: Patient sounds very sick or weak to the triager    Negative: MILD difficulty breathing (e.g., minimal/no SOB at rest, SOB with walking, pulse <100)    Negative: Fever > 103 F (39.4 C)     Negative: [1] Fever > 101 F (38.3 C) AND [2] age > 60 years    Negative: [1] Fever > 100.0 F (37.8 C) AND [2] bedridden (e.g., nursing home patient, CVA, chronic illness, recovering from surgery)    Negative: HIGH RISK for severe COVID complications (e.g., weak immune system, age > 64 years, obesity with BMI > 25, pregnant, chronic lung disease or other chronic medical condition)  (Exception: Already seen by PCP and no new or worsening symptoms.)    Negative: [1] HIGH RISK patient AND [2] influenza is widespread in the community AND [3] ONE OR MORE respiratory symptoms: cough, sore throat, runny or stuffy nose    Negative: [1] HIGH RISK patient AND [2] influenza exposure within the last 7 days AND [3] ONE OR MORE respiratory symptoms: cough, sore throat, runny or stuffy nose    Negative: Oxygen level (e.g., pulse oximetry) 91 to 94 percent    Negative: Fever present > 3 days (72 hours)    Negative: [1] Fever returns after gone for over 24 hours AND [2] symptoms worse or not improved    Negative: [1] Continuous (nonstop) coughing interferes with work or school AND [2] no improvement using cough treatment per Care Advice    Negative: [1] COVID-19 infection suspected by caller or triager AND [2] mild symptoms (cough, fever, or others) AND [3] negative COVID-19 rapid test    Negative: Cough present > 3 weeks    Negative: [1] COVID-19 diagnosed by positive lab test (e.g., PCR, rapid self-test kit) AND [2] NO symptoms (e.g., cough, fever, others)    Protocols used: CORONAVIRUS (COVID-19) DIAGNOSED OR DRAPHNKAR-H-YE 1.18.2022

## 2022-10-03 ENCOUNTER — VIRTUAL VISIT (OUTPATIENT)
Dept: FAMILY MEDICINE | Facility: CLINIC | Age: 67
End: 2022-10-03
Payer: COMMERCIAL

## 2022-10-03 DIAGNOSIS — U07.1 INFECTION DUE TO 2019 NOVEL CORONAVIRUS: Primary | ICD-10-CM

## 2022-10-03 PROCEDURE — 99214 OFFICE O/P EST MOD 30 MIN: CPT | Mod: 95 | Performed by: PHYSICIAN ASSISTANT

## 2022-10-03 RX ORDER — AMOXICILLIN AND CLAVULANATE POTASSIUM 250; 125 MG/1; MG/1
1 TABLET, FILM COATED ORAL 2 TIMES DAILY
COMMUNITY
End: 2022-10-14

## 2022-10-03 NOTE — PROGRESS NOTES
"Sachin is a 66 year old who is being evaluated via a billable video visit.      How would you like to obtain your AVS? MyChart  If the video visit is dropped, the invitation should be resent by: Text to cell phone: [phone]  Will anyone else be joining your video visit? No        Assessment & Plan     Elvin was seen today for covid concern.    Diagnoses and all orders for this visit:    Infection due to 2019 novel coronavirus  -     nirmatrelvir and ritonavir (PAXLOVID) therapy pack; Take 3 tablets by mouth 2 times daily for 5 days (Take 2 Nirmatrelvir tablets and 1 Ritonavir tablet twice daily for 5 days)        COVID-19 positive patient.  Encounter for consideration of medication intervention. Patient does qualify for a prescription. Full discussion with patient including medication options, risks and benefits. Potential drug interactions reviewed with patient. Encouraged additional discussion with his specialist regarding remicade infusion and reviewed CDC guidance on quarantine.    Treatment Planned Paxlovid - pt [facility] [place] [facility]    Temporary change to home medications: advised additional consult with his specialist regarding remicade infusion     Estimated body mass index is 23.37 kg/m  as calculated from the following:    Height as of 2/24/22: 1.88 m (6' 2\").    Weight as of 5/3/22: 82.6 kg (182 lb).  GFR Estimate   Date Value Ref Range Status   02/16/2022 68 >60 mL/min/1.73m2 Final     Comment:     Effective December 21, 2021 eGFRcr in adults is calculated using the 2021 CKD-EPI creatinine equation which includes age and gender (Silvestre et al., [place], DOI: 10.1056/FZCFdo4442193)   06/22/2021 >60 >60 mL/min/1.73m2 Final     Lab Results   Component Value Date    [id number] NEGATIVE 06/21/2021       Return today (on 10/3/2022) for treatment.    IRENE Jeffrey [facility]    [facility]   Sachin is a 66 year old, presenting for the following health issues:  Covid " Concern    HPI   COVID-19 Symptom Review  How many days ago did these symptoms start? 9/29/2022, tested positive 9/30/2022  Wife currently being treated with paxlovid  Fully immunized and boosted with bivalent vaccine   Had evusheld shots back in May and scheduled for next in Nov.  PMHx significant for Crohn's disease - immunosuppressive medication with remicaide infusion every 6 weeks - due on Friday  Normal renal function    Are any of the following symptoms significant for you?    New or worsening difficulty breathing? No    Worsening cough? Yes, I am coughing up mucus. A little congested in forehead/sinus/nose - no longer feeling need to use decongestant. Cough very mild and doesn't bother him. Drinking fluids.    Fever or chills? No    Headache: YES    Sore throat: YES    Chest pain: No    Diarrhea: No    Body aches? No    What treatments has patient tried? Guaifenesin (mucinex), Acetaminophen and Nonsteroidals   Does patient live in a nursing home, group home, or shelter? No  Does patient have a way to get food/medications during quarantined? Yes, I have a friend or family member who can help me.            Review of Systems   Constitutional, HEENT, cardiovascular, pulmonary, gi and gu systems are negative, except as otherwise noted.      Objective              MASSBP Score 10/3/2022   Age Greater than or equal to 65 years 2   BMI greater than or equal to 35 kg/m2 0   Has Diabetes Mellitus 0   Has Chronic Kidney Disease 0   Has Cardiovascular Disease and 55 years or older 0   Has Chronic Respiratory Disease and 55 years or older 0   Has Hypertension and 55 years or older 0   Is Immunocompromised 4   Is Pregnant 0   Member of BIPOC community (Black/, /, ,  or , or  or [place] Native)  0   MASSBP Score 6   Has the patient had a positive COVID test outside our system?  Yes   What day did symptoms start?  9/29/2022       Estimated  "body mass index is 23.37 kg/m  as calculated from the following:    Height as of 2/24/22: 1.88 m (6' 2\").    Weight as of 5/3/22: 82.6 kg (182 lb).     GFR Estimate   Date Value Ref Range Status   02/16/2022 68 >60 mL/min/1.73m2 Final     Comment:     Effective December 21, 2021 eGFRcr in adults is calculated using the 2021 CKD-EPI creatinine equation which includes age and gender (Silvestre ryan al., NE, DOI: 10.1056/CWILgb4148522)   06/22/2021 >60 >60 mL/min/1.73m2 Final        FDA Facts Sheet  [facility]    Medications were reviewed with the patient and held or adjusted where applicable.    Current Outpatient Medications   Medication     amoxicillin-clavulanate (AUGMENTIN) 250-125 MG tablet     ascorbic acid, vitamin C, (VITAMIN C) 1000 MG tablet     calcium citrate (CALCITRATE) 200 mg (950 mg) tablet     cholecalciferol, vitamin D3, 1,000 unit tablet     cyanocobalamin, vitamin B-12, 2,500 mcg Tab     esomeprazole (NEXIUM) 20 MG capsule     inFLIXimab (REMICADE) 100 MG injection     MEDICATION CANNOT BE REORDERED - PLEASE MANUALLY REORDER AND DISCONTINUE THE OLD ORDER     multivitamin w/minerals (THERA-VIT-M) tablet     nirmatrelvir and ritonavir (PAXLOVID) therapy pack     Current Facility-Administered Medications   Medication     [START ON 11/7/2022] cilgavimab 300 mg/tixagevimab 300 mg (EVUSHELD) - FULL DOSE               Vitals:  No vitals were obtained today due to virtual visit.    Physical Exam   GENERAL: Healthy, alert and no distress  EYES: Eyes grossly normal to inspection.  No discharge or erythema, or obvious scleral/conjunctival abnormalities.  RESP: No audible wheeze, cough, or visible cyanosis.  No visible retractions or increased work of breathing.    SKIN: Visible skin clear. No significant rash, abnormal pigmentation or lesions.  NEURO: Cranial nerves grossly intact.  Mentation and speech appropriate for age.  PSYCH: Mentation appears normal, affect normal/bright, judgement " and insight intact, normal speech and appearance well-groomed.          Video-Visit Details    Video Start Time: 7:46 AM    Type of service:  Video Visit    Video End Time:8:06 AM    Originating Location (pt. Location): Home    Distant Location (provider location):  [facility]     Platform used for Video Visit: Silver Curve

## 2022-10-03 NOTE — PATIENT INSTRUCTIONS
Instructions for Patients      What are the symptoms of COVID-19?  Symptoms can include fever, cough, shortness of breath, chills, headache, muscle pain sore throat, fatigue, runny or stuffy nose, and loss of taste and smell. Other less common symptoms include nausea, vomiting, or diarrhea (watery stools).    Know when to call 911. Emergency warning signs include:    Trouble breathing or shortness of breath    Pain or pressure in the chest that doesn't go away    Feeling confused like you haven't felt before, or not being able to wake up    Bluish-colored lips or face    How can I take care of myself?  1. Get lots of rest. Drink extra fluids (unless a doctor has told you not to).  2. Take Tylenol (acetaminophen) for fever or pain. If you have liver or kidney problems, ask your family doctor if it's okay to take Tylenol   Adults:   650 mg (two 325 mg pills or tablets) every 4 to 6 hours, or...   1,000 mg (two 500 mg pills or tablets) every 8 hours as needed.  Note: Don't take more than 3,000 mg in one day. Acetaminophen is found in many medicines (both prescribed and over the counter). Read all labels to be sure you don't take too much.  For children, check the Tylenol bottle for the right dose. The dose is based on the child's age or weight.  3. Take over the counter medicines for your symptoms as needed. Talk to your pharmacist.  4. If you have other health problems (like cancer, heart failure, an organ transplant, or severe kidney disease): Call your specialty clinic if you don't feel better in the next 2 days.    Where can I get more information?     [employer] [place] COVID-19 Resource Hub: www.[employer]ats[employer].org/covid19/     CDC Quarantine & Isolation: https://www.cdc.gov/coronavirus/2019-ncov/your-health/quarantine-isolation.html     CDC - What to Do If You're Sick: https://www.cdc.gov/coronavirus/2019-ncov/if-you-are-sick/index.html    [facility] clinical trials (COVID-19 research studies):  clinicalaffairs.[facility]/n-clinical-trials    [place] Department of Health COVID-19 Public Hotline: 1-159.640.9145

## 2022-10-06 ENCOUNTER — TRANSFERRED RECORDS (OUTPATIENT)
Dept: HEALTH INFORMATION MANAGEMENT | Facility: CLINIC | Age: 67
End: 2022-10-06

## 2022-10-07 ENCOUNTER — TRANSCRIBE ORDERS (OUTPATIENT)
Dept: OTHER | Age: 67
End: 2022-10-07

## 2022-10-07 DIAGNOSIS — K50.80 CROHN'S DISEASE OF BOTH SMALL AND LARGE INTESTINE WITHOUT COMPLICATION (H): Primary | ICD-10-CM

## 2022-10-10 ENCOUNTER — TELEPHONE (OUTPATIENT)
Dept: INFECTIOUS DISEASES | Facility: CLINIC | Age: 67
End: 2022-10-10

## 2022-10-10 ENCOUNTER — PATIENT OUTREACH (OUTPATIENT)
Dept: SURGERY | Facility: CLINIC | Age: 67
End: 2022-10-10

## 2022-10-10 NOTE — PROGRESS NOTES
Referral for perianal abscess:     Hx of Crohns. Pt has had an anal fistula for awhile. He was going to get a seton at one point but the abscess/fistula resolved so he did not want a seton. He is currently on Augmentin and the abscess is resolving. Denies pain, swollen area, fevers. He has a small amt of drainage. Scheduled appt with Brigida MORALES to discuss seton placement.

## 2022-10-10 NOTE — TELEPHONE ENCOUNTER
Pt states he called a week ago and nobody has called him back. He is scheduled on 11/8 for the Evusheld vaccine, pt is wondering if he should get it. He is just getting over covid. Please call him at [phone], pt has to go into a meeting, he can be contacted tomorrow or leave a vm for him.

## 2022-10-10 NOTE — TELEPHONE ENCOUNTER
Diagnosis, Referred by & from: Anal fistula, Dr. Kvng Baum ([facility])   Appt date: 11/2/2022, 10 AM    NOTES STATUS DETAILS   OFFICE NOTE from referring provider Received 3/16/2022 Office visit with Dr. Kvng Baum    3/3/2022, 10/13/2021 Office visti with Dr. Phillip Hunter ([facility])      OFFICE NOTE from other specialist Received/ Internal  10/14/2022 Office visit with Jai Obando NP ([facility])    8/12/2022, 7/8/2022, 6/7/2022 Office visit with Dr. Kim Jin ([facility])     6/11/2020 Office visit with Dr. Kyle Castañeda ([facility])     7/10/19, 11/6/18 Office visit with Dr. Jorge Mooney ([facility])    DISCHARGE SUMMARY from hospital N/A    DISCHARGE REPORT from the ER N/A    OPERATIVE REPORT Internal [facility]:  6/15/20 - OP Note for DRAINAGE OF ABSCESS with Dr. Ornelas   MEDICATION LIST Internal    LABS     BIOPSIES/PATHOLOGY RELATED TO DIAGNOSIS Received 8/30/17 ([facility])    DIAGNOSTIC PROCEDURES     COLONOSCOPY Received 4/5/2022, 1/8/2020, 8/30/17, 12/17/14 ([facility])    UPPER ENDOSCOPY (EGD) Received 8/10/2021, 5/17/2021 ([facility])    IMAGING (DISC & REPORT)      CT Internal CT Abdomen Pelvis: 8/5/2022, 6/14/2022, 11/6/2020, 9/21/2020, 8/4/2020  CT Pelvis: 8/26/2020, 8/12/2020  CT Absc/Cyst: 7/28/2020  CT Enterography: 12/15/11, 10/14/08

## 2022-10-11 NOTE — TELEPHONE ENCOUNTER
Per Dr Hurtado and Susieusheld guidelines, okay for pt to receive Evusheld if he has not tested positive within 20 days prior to injection.     Pt notified and states he received COVID booster in mid September and tested Positive for COVID about 12 days ago. Pt states he is feeling better now, no fever. Pt will keep appt as scheduled on 11/8/22 for Evusheld injection.

## 2022-10-14 ENCOUNTER — OFFICE VISIT (OUTPATIENT)
Dept: FAMILY MEDICINE | Facility: CLINIC | Age: 67
End: 2022-10-14
Payer: COMMERCIAL

## 2022-10-14 VITALS
BODY MASS INDEX: 24.01 KG/M2 | WEIGHT: 187 LBS | HEART RATE: 79 BPM | RESPIRATION RATE: 18 BRPM | DIASTOLIC BLOOD PRESSURE: 74 MMHG | OXYGEN SATURATION: 97 % | SYSTOLIC BLOOD PRESSURE: 118 MMHG | TEMPERATURE: 98.4 F

## 2022-10-14 DIAGNOSIS — K61.1 PERIRECTAL ABSCESS: ICD-10-CM

## 2022-10-14 DIAGNOSIS — J01.10 ACUTE NON-RECURRENT FRONTAL SINUSITIS: Primary | ICD-10-CM

## 2022-10-14 DIAGNOSIS — U07.1 INFECTION DUE TO 2019 NOVEL CORONAVIRUS: ICD-10-CM

## 2022-10-14 DIAGNOSIS — K50.814 CROHN'S DISEASE OF BOTH SMALL AND LARGE INTESTINE WITH ABSCESS (H): ICD-10-CM

## 2022-10-14 DIAGNOSIS — R05.1 ACUTE COUGH: Primary | ICD-10-CM

## 2022-10-14 PROBLEM — R19.8 DIGESTIVE SYMPTOM: Status: ACTIVE | Noted: 2022-03-16

## 2022-10-14 PROBLEM — R19.8 RECTAL DISCHARGE: Status: ACTIVE | Noted: 2022-10-14

## 2022-10-14 PROBLEM — Z93.3 COLOSTOMY PRESENT (H): Status: ACTIVE | Noted: 2018-04-24

## 2022-10-14 PROCEDURE — 99213 OFFICE O/P EST LOW 20 MIN: CPT | Mod: CS | Performed by: NURSE PRACTITIONER

## 2022-10-14 RX ORDER — AMOXICILLIN AND CLAVULANATE POTASSIUM 250; 125 MG/1; MG/1
1 TABLET, FILM COATED ORAL 2 TIMES DAILY
Qty: 14 TABLET | Refills: 0 | Status: SHIPPED | OUTPATIENT
Start: 2022-10-14 | End: 2023-04-14

## 2022-10-14 NOTE — PATIENT INSTRUCTIONS
Home  Augmentin given sinusitis and crohns with abscess  Flonase (fluticasone) 2 sprays in each nostril daily until symptoms resolve, then continue 1 spray in each nostril for at least 5 more days.  Take Tylenol or an NSAID such as ibuprofen or naproxen as needed for pain.  May use netti pot with bottled or distilled water and saline packets to flush sinuses.  Sudafed (pseudoephedrine) behind the pharmacist counter for 3-5 days helps relieve congestion.  Afrin (oxymetazoline) nasal spray twice daily for 3 days. Stop after 3 days.  Mucinex (guiafenesin) thins mucus and may help it to loosen more quickly  Saline drops or nasal sprays may loosen mucus.  Sit in the bathroom with the door closed and hot shower running to loosen mucus.  Contact primary care clinic if you do not have any relief from your symptoms after 10 days.  Present to emergency room for significantly increasing pain, persistent high fever >102F, swelling/redness around your eyes, changes in your vision or ability to move your eyes, altered mental status or a severe headache.

## 2022-10-14 NOTE — TELEPHONE ENCOUNTER
Pt had originally declined the augmentin, because he got confused. But stated that he would like it now and needs that to be filled asap

## 2022-10-14 NOTE — PROGRESS NOTES
Chief Complaint   Patient presents with     Sinus Problem     SUBJECTIVE:  Elvin Castro is a 66 year old male presenting with frontal sinus pressure, congestion, facial pain, earaches, hard of hearing, shortness of breath, cough for 2 weeks. He tested positive for covid on 10/01. His remicaid was held due to illness. Colorectal prescribed him augmentin for an abscess, but he has not started it yet. No fevers, chest pain, bloody sputum. He was prescribed paxlovid, but did not take it.    Past Medical History:   Diagnosis Date     Bowel obstruction (H)      Crohn's colitis (H)      History of anesthesia complications     Slow to wake     PONV (postoperative nausea and vomiting)      Primary osteoarthritis of both hands 11/6/2018     ascorbic acid, vitamin C, (VITAMIN C) 1000 MG tablet, [ASCORBIC ACID, VITAMIN C, (VITAMIN C) 1000 MG TABLET] Take 1,000 mg by mouth daily.  calcium citrate (CALCITRATE) 200 mg (950 mg) tablet, Take 2 tablets by mouth 2 times daily  cholecalciferol, vitamin D3, 1,000 unit tablet, Take 2,000 Units by mouth daily  cyanocobalamin, vitamin B-12, 2,500 mcg Tab, Take 1,250 mcg by mouth daily  esomeprazole (NEXIUM) 20 MG capsule, [ESOMEPRAZOLE (NEXIUM) 20 MG CAPSULE] Take 2 capsules by mouth.  inFLIXimab (REMICADE) 100 MG injection, Administer Remicade 10mg/kg every six weeks, infuse by IV route  MEDICATION CANNOT BE REORDERED - PLEASE MANUALLY REORDER AND DISCONTINUE THE OLD ORDER, [INFLIXIMAB (REMICADE) 100 MG INJECTION] Administer Remicade 10mg/kg every six weeks, infuse by IV route  multivitamin w/minerals (THERA-VIT-M) tablet, Take 1 tablet by mouth daily  amoxicillin-clavulanate (AUGMENTIN) 250-125 MG tablet, Take 1 tablet by mouth 2 times daily (Patient not taking: Reported on 10/14/2022)    [START ON 11/7/2022] cilgavimab 300 mg/tixagevimab 300 mg (SHONDA) - FULL DOSE      Social History     Tobacco Use     Smoking status: Never     Smokeless tobacco: Never   Substance Use Topics      Alcohol use: Yes     Alcohol/week: 1.0 standard drink     Comment: Alcoholic Drinks/day: once a month     Allergies   Allergen Reactions     Mercaptopurine Muscle Pain (Myalgia)     Extreme muscle weakness, required admission to hospital.      Review of Systems   All systems negative except for those listed above in HPI.    OBJECTIVE:   /74   Pulse 79   Temp 98.4  F (36.9  C) (Oral)   Resp 18   Wt 84.8 kg (187 lb)   SpO2 97%   BMI 24.01 kg/m       Physical Exam  Vitals reviewed.   Constitutional:       Appearance: Normal appearance. He is not ill-appearing.   HENT:      Head: Normocephalic and atraumatic.      Right Ear: Tympanic membrane and ear canal normal.      Left Ear: Tympanic membrane and ear canal normal.      Nose: Congestion and rhinorrhea present.      Mouth/Throat:      Mouth: Mucous membranes are moist.      Pharynx: Oropharynx is clear. Posterior oropharyngeal erythema present. No oropharyngeal exudate.   Cardiovascular:      Rate and Rhythm: Normal rate.   Pulmonary:      Effort: Pulmonary effort is normal. No respiratory distress.      Breath sounds: No stridor. No wheezing, rhonchi or rales.   Chest:      Chest wall: No tenderness.   Lymphadenopathy:      Cervical: No cervical adenopathy.   Skin:     General: Skin is warm and dry.   Neurological:      General: No focal deficit present.      Mental Status: He is alert and oriented to person, place, and time.   Psychiatric:         Mood and Affect: Mood normal.         Behavior: Behavior normal.       ASSESSMENT:    ICD-10-CM    1. Acute non-recurrent frontal sinusitis  J01.10       2. Perirectal abscess  K61.1       3. Crohn's disease of both small and large intestine with abscess (H)  K50.814       4. Infection due to 2019 novel coronavirus  U07.1         PLAN:    Home  Augmentin given sinusitis and crohns with abscess  Lungs clear, vitals stable  Follow-up with colorectal as well  Flonase (fluticasone) 2 sprays in each nostril daily  until symptoms resolve, then continue 1 spray in each nostril for at least 5 more days.  Take Tylenol or an NSAID such as ibuprofen or naproxen as needed for pain.  May use netti pot with bottled or distilled water and saline packets to flush sinuses.  Sudafed (pseudoephedrine) behind the pharmacist counter for 3-5 days helps relieve congestion.  Afrin (oxymetazoline) nasal spray twice daily for 3 days. Stop after 3 days.  Mucinex (guiafenesin) thins mucus and may help it to loosen more quickly  Saline drops or nasal sprays may loosen mucus.  Sit in the bathroom with the door closed and hot shower running to loosen mucus.  Contact primary care clinic if you do not have any relief from your symptoms after 10 days.  Present to emergency room for significantly increasing pain, persistent high fever >102F, swelling/redness around your eyes, changes in your vision or ability to move your eyes, altered mental status or a severe headache.    Follow up with primary care provider with any problems, questions or concerns or if symptoms worsen or fail to improve. Patient agreed to plan and verbalized understanding.    Shyanne Obando, [facility]

## 2022-10-15 ENCOUNTER — HEALTH MAINTENANCE LETTER (OUTPATIENT)
Age: 67
End: 2022-10-15

## 2022-10-17 ENCOUNTER — TRANSFERRED RECORDS (OUTPATIENT)
Dept: HEALTH INFORMATION MANAGEMENT | Facility: CLINIC | Age: 67
End: 2022-10-17

## 2022-10-27 ENCOUNTER — MYC MEDICAL ADVICE (OUTPATIENT)
Dept: INTERNAL MEDICINE | Facility: CLINIC | Age: 67
End: 2022-10-27

## 2022-10-27 ENCOUNTER — TRANSFERRED RECORDS (OUTPATIENT)
Dept: HEALTH INFORMATION MANAGEMENT | Facility: CLINIC | Age: 67
End: 2022-10-27

## 2022-10-27 DIAGNOSIS — M85.89 OSTEOPENIA OF MULTIPLE SITES: Primary | ICD-10-CM

## 2022-11-02 ENCOUNTER — PRE VISIT (OUTPATIENT)
Dept: SURGERY | Facility: CLINIC | Age: 67
End: 2022-11-02

## 2022-11-08 ENCOUNTER — ALLIED HEALTH/NURSE VISIT (OUTPATIENT)
Dept: ENDOCRINOLOGY | Facility: CLINIC | Age: 67
End: 2022-11-08
Payer: COMMERCIAL

## 2022-11-08 DIAGNOSIS — D84.9 IMMUNODEFICIENCY (H): Primary | ICD-10-CM

## 2022-11-08 PROCEDURE — M0220 PR INJECTION TIXAGEVIMAB & CILGAVIMAB (EVUSHELD): HCPCS | Performed by: STUDENT IN AN ORGANIZED HEALTH CARE EDUCATION/TRAINING PROGRAM

## 2022-11-08 NOTE — PROGRESS NOTES
Evusheld Consent   Confirmed patient received the Emergency Use Authorization Fact Sheet for Patients, Parents and Caregivers prior to receiving medication. We discussed the following risks and benefits of receiving EVUSHELD, as well as alternative treatments and the patient wished to proceed with EVUSHELD.     Providers believe the benefits of Evusheld outweigh the risks for all moderately to severely immunocompromised patients.      Benefits:   Evusheld is a synthetic antibody that provides protection against COVID-19 for 6 months and is recommended for patients that have a weakened immune system that may not be able to make antibodies themselves.     Risks:    There is a very small risk of allergic reactions and you should notify us if you have any symptoms of an allergic reaction after the injection. There were also some extremely rare cardiac events reported in the initial trials in people that already had heart disease, but experts do not think these were caused by the medication. We will observe you for any chest pain or trouble breathing after the injections and you can reach out to your provider if any of these symptoms develop.     Alternatives:   Vaccines to prevent COVID-19 are approved or available under Emergency Use Authorization. Use of EVUSHELD does not replace vaccination against COVID-19. You can continue to mask and isolate to avoid infections. It is your choice to receive or not receive EVUSHELD. Should you decide not to receive EVUSHELD, it will not change your standard medical care.     Patient does consent to the injection.    Evusheld Consent      Providers believe the benefits of Evusheld outweigh the risks for all moderately to severely immunocompromised patients.       Benefits:   Evusheld is a synthetic antibody that provides protection against COVID-19 for 6 months and is recommended for patients that have a weakened immune system that may not be able to make antibodies themselves.       Risks:    There is a very small risk of allergic reactions and you should notify us if you have any symptoms of an allergic reaction after the injection. There were also some extremely rare cardiac events reported in the initial trials in people that already had heart disease, but experts do not think these were caused by the medication. We will observe you for any chest pain or trouble breathing after the injections and you can reach out to your provider if any of these symptoms develop.      Alternatives:   Vaccines to prevent COVID-19 are approved or available under Emergency Use Authorization. Use of EVUSHELD does not replace vaccination against COVID-19. You can continue to mask and isolate to avoid infections. It is your choice to receive or not receive EVUSHELD. Should you decide not to receive EVUSHELD, it will not change your standard medical care.      Patient does consent to the injection.        EVUSHELD Administration Note:  Elvin*  presents today for EVUSHELD.   present during visit today: NO     Consent:   Informed Consent confirmed in chart and with written procedure consent, obtained on this date:11/8/22     Post Injection Assessment:   Patient tolerated injection without incident.       Patient was observed in the room for a minimum of 10 minutes after injection per standard, then remained in the buidling for a total 60 minute observation period.        The following medication was given:     MEDICATION: Evusheld  ROUTE: IM  SITE: R and L ventroglutea  DOSE: 6 mL  LOT #: see mar  :[employer]  EXPIRATION DATE:  12/31/22  NDC#: 0310-742-02

## 2022-11-14 ENCOUNTER — ANCILLARY PROCEDURE (OUTPATIENT)
Dept: BONE DENSITY | Facility: CLINIC | Age: 67
End: 2022-11-14
Attending: INTERNAL MEDICINE
Payer: COMMERCIAL

## 2022-11-14 DIAGNOSIS — M85.89 OSTEOPENIA OF MULTIPLE SITES: ICD-10-CM

## 2022-11-14 PROCEDURE — 77080 DXA BONE DENSITY AXIAL: CPT | Mod: TC | Performed by: RADIOLOGY

## 2022-12-02 ENCOUNTER — TRANSFERRED RECORDS (OUTPATIENT)
Dept: HEALTH INFORMATION MANAGEMENT | Facility: CLINIC | Age: 67
End: 2022-12-02

## 2022-12-06 ENCOUNTER — TRANSFERRED RECORDS (OUTPATIENT)
Dept: HEALTH INFORMATION MANAGEMENT | Facility: CLINIC | Age: 67
End: 2022-12-06

## 2022-12-21 ENCOUNTER — TRANSFERRED RECORDS (OUTPATIENT)
Dept: HEALTH INFORMATION MANAGEMENT | Facility: CLINIC | Age: 67
End: 2022-12-21

## 2023-03-26 ENCOUNTER — HEALTH MAINTENANCE LETTER (OUTPATIENT)
Age: 68
End: 2023-03-26

## 2023-04-10 ENCOUNTER — TRANSFERRED RECORDS (OUTPATIENT)
Dept: HEALTH INFORMATION MANAGEMENT | Facility: CLINIC | Age: 68
End: 2023-04-10
Payer: COMMERCIAL

## 2023-04-10 LAB
ALT SERPL-CCNC: 15 IU/L (ref 0–44)
AST SERPL-CCNC: 21 IU/L (ref 0–40)

## 2023-04-12 ASSESSMENT — ENCOUNTER SYMPTOMS
DYSURIA: 0
FEVER: 0
FREQUENCY: 0
COUGH: 0
DIARRHEA: 1
PARESTHESIAS: 0
MYALGIAS: 0
ARTHRALGIAS: 1
SHORTNESS OF BREATH: 1
HEMATURIA: 0
HEARTBURN: 1
WEAKNESS: 0
CONSTIPATION: 0
ABDOMINAL PAIN: 0
HEADACHES: 1
CHILLS: 0
EYE PAIN: 0
NAUSEA: 1
DIZZINESS: 1
SORE THROAT: 0
NERVOUS/ANXIOUS: 0
PALPITATIONS: 0
HEMATOCHEZIA: 0

## 2023-04-12 ASSESSMENT — ACTIVITIES OF DAILY LIVING (ADL): CURRENT_FUNCTION: NO ASSISTANCE NEEDED

## 2023-04-14 ENCOUNTER — OFFICE VISIT (OUTPATIENT)
Dept: INTERNAL MEDICINE | Facility: CLINIC | Age: 68
End: 2023-04-14
Payer: COMMERCIAL

## 2023-04-14 VITALS
BODY MASS INDEX: 23.18 KG/M2 | OXYGEN SATURATION: 98 % | HEART RATE: 82 BPM | RESPIRATION RATE: 18 BRPM | SYSTOLIC BLOOD PRESSURE: 128 MMHG | DIASTOLIC BLOOD PRESSURE: 70 MMHG | HEIGHT: 75 IN | WEIGHT: 186.44 LBS | TEMPERATURE: 98.3 F

## 2023-04-14 DIAGNOSIS — Z12.5 SCREENING FOR PROSTATE CANCER: ICD-10-CM

## 2023-04-14 DIAGNOSIS — Z00.00 ENCOUNTER FOR WELLNESS EXAMINATION IN ADULT: Primary | ICD-10-CM

## 2023-04-14 DIAGNOSIS — K50.019 CROHN'S DISEASE OF SMALL INTESTINE WITH COMPLICATION (H): ICD-10-CM

## 2023-04-14 DIAGNOSIS — K20.80 LYMPHOCYTIC ESOPHAGITIS: ICD-10-CM

## 2023-04-14 DIAGNOSIS — Z51.81 ENCOUNTER FOR THERAPEUTIC DRUG MONITORING: ICD-10-CM

## 2023-04-14 LAB
ANION GAP SERPL CALCULATED.3IONS-SCNC: 14 MMOL/L (ref 7–15)
BUN SERPL-MCNC: 16.5 MG/DL (ref 8–23)
CALCIUM SERPL-MCNC: 8.9 MG/DL (ref 8.8–10.2)
CHLORIDE SERPL-SCNC: 104 MMOL/L (ref 98–107)
CREAT SERPL-MCNC: 1.24 MG/DL (ref 0.67–1.17)
DEPRECATED HCO3 PLAS-SCNC: 22 MMOL/L (ref 22–29)
GFR SERPL CREATININE-BSD FRML MDRD: 64 ML/MIN/1.73M2
GLUCOSE SERPL-MCNC: 88 MG/DL (ref 70–99)
POTASSIUM SERPL-SCNC: 3.5 MMOL/L (ref 3.4–5.3)
PSA SERPL DL<=0.01 NG/ML-MCNC: 0.68 NG/ML (ref 0–4.5)
SODIUM SERPL-SCNC: 140 MMOL/L (ref 136–145)

## 2023-04-14 PROCEDURE — 36415 COLL VENOUS BLD VENIPUNCTURE: CPT | Performed by: INTERNAL MEDICINE

## 2023-04-14 PROCEDURE — G0103 PSA SCREENING: HCPCS | Performed by: INTERNAL MEDICINE

## 2023-04-14 PROCEDURE — 99214 OFFICE O/P EST MOD 30 MIN: CPT | Mod: 25 | Performed by: INTERNAL MEDICINE

## 2023-04-14 PROCEDURE — 80048 BASIC METABOLIC PNL TOTAL CA: CPT | Performed by: INTERNAL MEDICINE

## 2023-04-14 PROCEDURE — G0439 PPPS, SUBSEQ VISIT: HCPCS | Performed by: INTERNAL MEDICINE

## 2023-04-14 ASSESSMENT — ENCOUNTER SYMPTOMS
COUGH: 0
ARTHRALGIAS: 1
WEAKNESS: 0
SORE THROAT: 0
FREQUENCY: 0
PALPITATIONS: 0
DIARRHEA: 1
FEVER: 0
DYSURIA: 0
HEMATOCHEZIA: 0
NAUSEA: 1
DIZZINESS: 1
CHILLS: 0
HEMATURIA: 0
CONSTIPATION: 0
EYE PAIN: 0
NERVOUS/ANXIOUS: 0
ABDOMINAL PAIN: 0
PARESTHESIAS: 0
MYALGIAS: 0
HEADACHES: 1
SHORTNESS OF BREATH: 1
HEARTBURN: 1

## 2023-04-14 ASSESSMENT — ACTIVITIES OF DAILY LIVING (ADL): CURRENT_FUNCTION: NO ASSISTANCE NEEDED

## 2023-04-14 NOTE — PROGRESS NOTES
SUBJECTIVE:   Sachin is a 67 year old who presents for Preventive Visit.    History of Crohn's, previous small bowel resection in 1989 in 1993.  Terminal ileum was resected.  Colonoscopy February 2020 showed 2 ileal strictures.  He has a colostomy.  Some dry irritated skin around the colostomy and not using a product, but it is currently not as bad as previous.    Ostomy output has been stable, no change or melena or purulence noted.    September 2022 EGD with esophageal dilatation, biopsy showed continued lymphocytic esophagitis but not eosinophil esophagitis.  H. pylori was negative.  He did swallow better after the esophageal dilatation.  Still on Nexium.    Takes B12 orally.    Still on Remicade through gastroenterology.  TB test was negative last month.    He saw dermatology in October with negative exam, no history of skin cancer.    Just ophthalmology recently, no retinal changes.    He is active with walking.  He does admit to sitting more when down in [place], but walks about twice a day.  Can walk 5 miles without difficulty.  He does note when he is carrying heavy objects and walking he does have some mild shortness of breath occasionally but denies that that is been progressive or worsening.  He has not had chest pain with activity.    I did review the stress test in February 2022 with patient, there was some diaphragmatic attenuation but otherwise negative for ischemia.    He has had previous abdominal surgery, with incisions all the way up to his epigastrium, which could have created some scar tissue and shadowing.    March 2022 chest CT scan did not show significant coronary calcification.  Chest CT scan did not show significant fibrosis.  Small amount of focal scarring and bronchiectasis but not severe.    He has not had significant chronic cough or respiratory symptoms.    He did have a mild case of COVID in September of last year.    He is never smoked.    Blood pressures been normal without  "medication.    2022 CT scan of the abdomen showed some mild fatty liver.    In  his LDL was 59, he has a very low cholesterol with no terminal ileum.    Urinating normally.  His father  of prostate cancer.  PSA level was normal last year.    He still has some occasional tension type headaches similar to last year.  Had MRI 2022 was negative.  He does not always sleep well at night, sometimes unable to fall back asleep.  He does snore, but no witnessed apneas and he is not obese.  Denies significant daytime sleepiness.    2022 DEXA scan with spine score -1.2.  Femur score -1.5/-1.9.  No falls.    Diet is good, healthy.  No mouth sores.  No skin changes.    He has some hard of hearing, wears hearing aids.  Some tinnitus has developed.  He did recently see ENT.    Patient brought some lab work in from 2023 with normal liver tests.  Hemoglobin of 13.7 with normal MCV.  White count 14 with mild increased neutrophils.    He is planning to go down to [facility] in July to have a pelvic abscess resected.  It is occasionally causing some leg radicular pain.  There were areas of bone, but is not getting antibiotics at this time.  He had been on Augmentin earlier.  No fevers or chills.  Rectal discomfort is stable.      2023     8:20 AM   Additional Questions   Roomed by TRISH GOYAL     Patient has been advised of split billing requirements and indicates understanding: Yes  Are you in the first 12 months of your Medicare coverage?  No    Healthy Habits:     In general, how would you rate your overall health?  Fair    Frequency of exercise:  4-5 days/week    Duration of exercise:  45-60 minutes    Do you usually eat at least 4 servings of fruit and vegetables a day, include whole grains    & fiber and avoid regularly eating high fat or \"junk\" foods?  Yes    Taking medications regularly:  Yes    Ability to successfully perform activities of daily living:  No assistance needed    Home " Safety:  No safety concerns identified    Hearing Impairment:  Difficulty following a conversation in a noisy restaurant or crowded room and find that men's voices are easier to understand than woman's    In the past 6 months, have you been bothered by leaking of urine?  No    In general, how would you rate your overall mental or emotional health?  Excellent      PHQ-2 Total Score: 0      Have you ever done Advance Care Planning? (For example, a Health Directive, POLST, or a discussion with a medical provider or your loved ones about your wishes): Yes, patient states has an Advance Care Planning document and will bring a copy to the clinic.       Fall risk  Fallen 2 or more times in the past year?: No  Any fall with injury in the past year?: No  click delete button to remove this line now  Cognitive Screening   1) Repeat 3 items (Leader, Season, Table)    2) Clock draw: NORMAL  3) 3 item recall: Recalls 3 objects  Results: 3 items recalled: COGNITIVE IMPAIRMENT LESS LIKELY    Mini-CogTM Copyright KETAN Malhotra. Licensed by the author for use in [facility]; reprinted with permission (wai@[facility]). All rights reserved.      Do you have sleep apnea, excessive snoring or daytime drowsiness?: no    Reviewed and updated as needed this visit by clinical staff   Tobacco   Meds              Reviewed and updated as needed this visit by Provider                 Social History     Tobacco Use     Smoking status: Never     Smokeless tobacco: Never   Vaping Use     Vaping status: Not on file   Substance Use Topics     Alcohol use: Yes     Alcohol/week: 1.0 standard drink of alcohol     Comment: Alcoholic Drinks/day: once a month             4/12/2023     4:15 PM   Alcohol Use   Prescreen: >3 drinks/day or >7 drinks/week? No          View : No data to display.              Do you have a current opioid prescription? No  Do you use any other controlled substances or medications that are not prescribed by a provider?  None        Current providers sharing in care for this patient include:   Patient Care Team:  Jorge Mooney MD as PCP - General (Internal Medicine)  Jorge Mooney MD as Assigned PCP  Juanjose Hurtado MD as Assigned Infectious Disease Provider    The following health maintenance items are reviewed in Epic and correct as of today:  Health Maintenance   Topic Date Due     ZOSTER IMMUNIZATION (1 of 2) Never done     LIPID  Never done     MEDICARE ANNUAL WELLNESS VISIT  02/24/2023     ANNUAL REVIEW OF HM ORDERS  02/24/2023     COLORECTAL CANCER SCREENING  04/05/2024     FALL RISK ASSESSMENT  04/14/2024     Pneumococcal Vaccine: 65+ Years (4 - PPSV23 if available, else PCV20) 04/30/2024     ADVANCE CARE PLANNING  02/24/2027     DTAP/TDAP/TD IMMUNIZATION (5 - Td or Tdap) 08/20/2030     HEPATITIS C SCREENING  Completed     PHQ-2 (once per calendar year)  Completed     INFLUENZA VACCINE  Completed     AORTIC ANEURYSM SCREENING (SYSTEM ASSIGNED)  Completed     COVID-19 Vaccine  Completed     IPV IMMUNIZATION  Aged Out     MENINGITIS IMMUNIZATION  Aged Out     Lab work is in process  Labs reviewed in [facility]  Current Outpatient Medications   Medication Sig Dispense Refill     ascorbic acid, vitamin C, (VITAMIN C) 1000 MG tablet [ASCORBIC ACID, VITAMIN C, (VITAMIN C) 1000 MG TABLET] Take 1,000 mg by mouth daily.       calcium citrate (CALCITRATE) 200 mg (950 mg) tablet Take 2 tablets by mouth 2 times daily       cholecalciferol, vitamin D3, 1,000 unit tablet Take 2,000 Units by mouth daily       cyanocobalamin, vitamin B-12, 2,500 mcg Tab Take 1,250 mcg by mouth daily       esomeprazole (NEXIUM) 20 MG capsule [ESOMEPRAZOLE (NEXIUM) 20 MG CAPSULE] Take 2 capsules by mouth.       inFLIXimab (REMICADE) 100 MG injection Administer Remicade 10mg/kg every six weeks, infuse by IV route       multivitamin w/minerals (THERA-VIT-M) tablet Take 1 tablet by mouth daily       MEDICATION CANNOT BE REORDERED - PLEASE MANUALLY REORDER AND  "DISCONTINUE THE OLD ORDER [INFLIXIMAB (REMICADE) 100 MG INJECTION] Administer Remicade 10mg/kg every six weeks, infuse by IV route (Patient not taking: Reported on 4/14/2023)       Allergies   Allergen Reactions     Mercaptopurine Muscle Pain (Myalgia)     Extreme muscle weakness, required admission to hospital.          Review of Systems   Constitutional: Negative for chills and fever.   HENT: Positive for ear pain and hearing loss. Negative for congestion and sore throat.    Eyes: Positive for visual disturbance. Negative for pain.   Respiratory: Positive for shortness of breath. Negative for cough.    Cardiovascular: Positive for peripheral edema. Negative for chest pain and palpitations.   Gastrointestinal: Positive for diarrhea, heartburn and nausea. Negative for abdominal pain, constipation and hematochezia.   Genitourinary: Positive for impotence. Negative for dysuria, frequency, genital sores, hematuria, penile discharge and urgency.   Musculoskeletal: Positive for arthralgias. Negative for myalgias.   Skin: Negative for rash.   Neurological: Positive for dizziness and headaches. Negative for weakness and paresthesias.   Psychiatric/Behavioral: Negative for mood changes. The patient is not nervous/anxious.      Mild dyspnea on exertion is stable, patient can still walk 5 miles without difficulty.  Occasional lower extremity edema at the end of the day but no edema at this time.  Recent ENT evaluation for his hearing aids.  No current ear pain.  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    OBJECTIVE:   Pulse 82   Temp 98.3  F (36.8  C) (Oral)   Resp 18   Ht 1.905 m (6' 3\")   Wt 84.6 kg (186 lb 7 oz)   SpO2 98%   BMI 23.30 kg/m   Estimated body mass index is 23.3 kg/m  as calculated from the following:    Height as of this encounter: 1.905 m (6' 3\").    Weight as of this encounter: 84.6 kg (186 lb 7 " oz).  Physical Exam  Been patient.  Alert and oriented x3.  Normal mood and affect.  Normal cognition, normal word recall and mobility.  Pupils and irises equal and reactive.  Extraocular muscles intact.  No jaundice or conjunctivitis.  He declined to remove his hearing aids.  Pharynx appears normal, no oral lesions.  Teeth in good condition.  No cervical or supraclavicular adenopathy.  There is no axillary adenopathy.  I previously noted a small left axillary lymph node last year but that was not present on today's exam.  Lungs are clear to auscultation.  There is no wheezing or crackles or rhonchi.  Good respiratory excursion.  Heart is regular with no murmur rub or gallop.  +2 pedal pulses and no ankle edema.  Feet in good condition.  Abdomen is thin, nontender.  No hepatosplenomegaly.  No pulsatile abdominal mass.  Ostomy in his left lower quadrant appears healthy although there is some dry chapped skin around the area of the ostomy, but no secondary cellulitis.  He declined rectal exam.  Skin exam without suspicious skin lesions    ASSESSMENT / PLAN:   (Z00.00) Encounter for wellness examination in adult  (primary encounter diagnosis)  Comment: Main issue for patient is his Crohn's disease and complication with the pelvic abscess which she is going to address at [facility], colorectal surgery department.  He still sees gastroenterology for the Crohn's, on Remicade.    We discussed the importance of regular exercise for maintaining body health.  He does need to improve regularity of exercise.    Routine eye exam in the spring was just done.  Yearly dermatology check in October, as on Remicade x2 in October  Plan: Patient is full code.    Up-to-date on immunizations but we did discuss getting Shingrix, local pharmacy    (K50.019) Crohn's disease of small intestine with complication (H)  Comment: Controlled, but with complication of a pelvic abscess currently.  Managed by GI with Remicade.  Plan: Basic metabolic  panel        Recent lab work with normal liver tests and hemogram with normal hemoglobin, just elevated white count consistent with his pelvic inflammation.    (K20.80) Lymphocytic esophagitis  Comment: Controlled on Nexium.  Swallowing adequately after esophageal dilatation September 2022 EGD  Plan:     (Z12.5) Screening for prostate cancer  Comment: No new urinary symptoms.  I did note to patient that his pelvic abscess inflammation may affect the PSA  Plan: Prostate Specific Antigen Screen            (Z51.81) Encounter for therapeutic drug monitoring  Comment:   Plan: Basic metabolic panel          History of chronic headaches.  Likely tension type headaches.  Possibly worse with recent pelvic inflammation.  Negative MRI last year.  Denies sleep apnea symptoms, but does not tend to sleep well at night.  He feels his diet and staying well-hydrated is good.  Follow-up if worsening symptoms.    Low cardiovascular risk with fairly good exercise tolerance with walking.  Negative stress test last year.  On follow-up if significant increasing shortness of breath or chest pain develops.    Patient has been advised of split billing requirements and indicates understanding: Yes      COUNSELING:  Reviewed preventive health counseling, as reflected in patient instructions       Regular exercise       Healthy diet/nutrition       Vision screening       Prostate cancer screening        He reports that he has never smoked. He has never used smokeless tobacco.      Appropriate preventive services were discussed with this patient, including applicable screening as appropriate for cardiovascular disease, diabetes, osteopenia/osteoporosis, and glaucoma.  As appropriate for age/gender, discussed screening for colorectal cancer, prostate cancer, breast cancer, and cervical cancer. Checklist reviewing preventive services available has been given to the patient.    Reviewed patients plan of care and provided an AVS. The Basic Care Plan  (routine screening as documented in Health Maintenance) for Elvin meets the Care Plan requirement. This Care Plan has been established and reviewed with the Patient.          Jorge Mooney MD  [facility]    Identified Health Risks:    I have reviewed Opioid Use Disorder and Substance Use Disorder risk factors and made any needed referrals.

## 2023-04-14 NOTE — PATIENT INSTRUCTIONS
Regular exercise is important for maintaining your body's health.  20 minutes of aerobic type exercise or walking is recommended daily.  Make sure you are stretching daily to avoid injury with exercise.    Follow-up with [facility] for your Crohn's complications, as planned.    If you develop a fever, or worsening pelvic or leg nerve pain complications, seek medical attention right away to see if you do need an antibiotic.    You can consider a Shingrix shingles vaccine, which can be obtained from local pharmacy.    Yearly dermatology exam in October.  Yearly eye exam in the spring, as you have done.    Follow-up with me in 1 year for adult wellness visit physical exam.

## 2023-05-10 ENCOUNTER — TRANSFERRED RECORDS (OUTPATIENT)
Dept: HEALTH INFORMATION MANAGEMENT | Facility: CLINIC | Age: 68
End: 2023-05-10
Payer: COMMERCIAL

## 2023-05-22 ENCOUNTER — TRANSFERRED RECORDS (OUTPATIENT)
Dept: HEALTH INFORMATION MANAGEMENT | Facility: CLINIC | Age: 68
End: 2023-05-22
Payer: COMMERCIAL

## 2023-07-05 ENCOUNTER — TRANSFERRED RECORDS (OUTPATIENT)
Dept: HEALTH INFORMATION MANAGEMENT | Facility: CLINIC | Age: 68
End: 2023-07-05
Payer: COMMERCIAL

## 2023-07-12 NOTE — TELEPHONE ENCOUNTER
Pt is wondering if he should get the booster when it becomes available shortly here.     Also should he get another dose of the Evusheld in Nov. It will be 6 mons.     Please call him at [phone], ok to leave detailed message.    Thalidomide Counseling: I discussed with the patient the risks of thalidomide including but not limited to birth defects, anxiety, weakness, chest pain, dizziness, cough and severe allergy.

## 2023-08-14 ENCOUNTER — TRANSFERRED RECORDS (OUTPATIENT)
Dept: HEALTH INFORMATION MANAGEMENT | Facility: CLINIC | Age: 68
End: 2023-08-14
Payer: COMMERCIAL

## 2023-08-14 LAB
ALT SERPL-CCNC: 66 IU/L (ref 0–44)
AST SERPL-CCNC: 95 IU/L (ref 0–40)

## 2023-08-22 ENCOUNTER — HOSPITAL ENCOUNTER (OUTPATIENT)
Dept: ULTRASOUND IMAGING | Facility: CLINIC | Age: 68
Discharge: HOME | End: 2023-08-22
Attending: PHYSICIAN ASSISTANT | Admitting: PHYSICIAN ASSISTANT
Payer: COMMERCIAL

## 2023-08-22 DIAGNOSIS — R79.89 ELEVATED LFTS: ICD-10-CM

## 2023-08-22 PROCEDURE — 76705 ECHO EXAM OF ABDOMEN: CPT

## 2023-08-29 ENCOUNTER — TRANSFERRED RECORDS (OUTPATIENT)
Dept: HEALTH INFORMATION MANAGEMENT | Facility: CLINIC | Age: 68
End: 2023-08-29
Payer: COMMERCIAL

## 2023-08-29 LAB
ALT SERPL-CCNC: 38 IU/L (ref 0–44)
ALT SERPL-CCNC: 38 IU/L (ref 0–44)
AST SERPL-CCNC: 25 IU/L (ref 0–40)
AST SERPL-CCNC: 25 IU/L (ref 0–40)
HEP C HIM: NORMAL

## 2023-09-01 ENCOUNTER — TRANSFERRED RECORDS (OUTPATIENT)
Dept: HEALTH INFORMATION MANAGEMENT | Facility: CLINIC | Age: 68
End: 2023-09-01
Payer: COMMERCIAL

## 2023-09-05 ENCOUNTER — TRANSFERRED RECORDS (OUTPATIENT)
Dept: HEALTH INFORMATION MANAGEMENT | Facility: CLINIC | Age: 68
End: 2023-09-05
Payer: COMMERCIAL

## 2023-09-05 LAB
ALT SERPL-CCNC: 16 IU/L (ref 0–44)
AST SERPL-CCNC: 21 IU/L (ref 0–40)

## 2023-09-25 ENCOUNTER — TRANSFERRED RECORDS (OUTPATIENT)
Dept: HEALTH INFORMATION MANAGEMENT | Facility: CLINIC | Age: 68
End: 2023-09-25
Payer: COMMERCIAL

## 2023-09-27 ENCOUNTER — TRANSFERRED RECORDS (OUTPATIENT)
Dept: HEALTH INFORMATION MANAGEMENT | Facility: CLINIC | Age: 68
End: 2023-09-27
Payer: COMMERCIAL

## 2023-10-31 ENCOUNTER — TRANSFERRED RECORDS (OUTPATIENT)
Dept: HEALTH INFORMATION MANAGEMENT | Facility: CLINIC | Age: 68
End: 2023-10-31
Payer: COMMERCIAL

## 2023-11-06 ENCOUNTER — TRANSFERRED RECORDS (OUTPATIENT)
Dept: HEALTH INFORMATION MANAGEMENT | Facility: CLINIC | Age: 68
End: 2023-11-06
Payer: COMMERCIAL

## 2023-11-08 ENCOUNTER — TRANSFERRED RECORDS (OUTPATIENT)
Dept: HEALTH INFORMATION MANAGEMENT | Facility: CLINIC | Age: 68
End: 2023-11-08
Payer: COMMERCIAL

## 2024-03-11 ENCOUNTER — TRANSFERRED RECORDS (OUTPATIENT)
Dept: HEALTH INFORMATION MANAGEMENT | Facility: CLINIC | Age: 69
End: 2024-03-11
Payer: COMMERCIAL

## 2024-03-11 LAB
ALT SERPL-CCNC: 14 IU/L (ref 0–44)
AST SERPL-CCNC: 25 IU/L (ref 0–40)

## 2024-03-12 ENCOUNTER — TRANSFERRED RECORDS (OUTPATIENT)
Dept: HEALTH INFORMATION MANAGEMENT | Facility: CLINIC | Age: 69
End: 2024-03-12
Payer: COMMERCIAL

## 2024-03-15 ENCOUNTER — PATIENT OUTREACH (OUTPATIENT)
Dept: CARE COORDINATION | Facility: CLINIC | Age: 69
End: 2024-03-15
Payer: COMMERCIAL

## 2024-03-15 ENCOUNTER — TRANSFERRED RECORDS (OUTPATIENT)
Dept: HEALTH INFORMATION MANAGEMENT | Facility: CLINIC | Age: 69
End: 2024-03-15
Payer: COMMERCIAL

## 2024-05-08 ENCOUNTER — TRANSFERRED RECORDS (OUTPATIENT)
Dept: HEALTH INFORMATION MANAGEMENT | Facility: CLINIC | Age: 69
End: 2024-05-08
Payer: COMMERCIAL

## 2024-05-08 SDOH — HEALTH STABILITY: PHYSICAL HEALTH: ON AVERAGE, HOW MANY MINUTES DO YOU ENGAGE IN EXERCISE AT THIS LEVEL?: 40 MIN

## 2024-05-08 SDOH — HEALTH STABILITY: PHYSICAL HEALTH: ON AVERAGE, HOW MANY DAYS PER WEEK DO YOU ENGAGE IN MODERATE TO STRENUOUS EXERCISE (LIKE A BRISK WALK)?: 3 DAYS

## 2024-05-08 ASSESSMENT — SOCIAL DETERMINANTS OF HEALTH (SDOH): HOW OFTEN DO YOU GET TOGETHER WITH FRIENDS OR RELATIVES?: THREE TIMES A WEEK

## 2024-05-14 ENCOUNTER — TRANSFERRED RECORDS (OUTPATIENT)
Dept: HEALTH INFORMATION MANAGEMENT | Facility: CLINIC | Age: 69
End: 2024-05-14
Payer: COMMERCIAL

## 2024-05-15 ENCOUNTER — OFFICE VISIT (OUTPATIENT)
Dept: INTERNAL MEDICINE | Facility: CLINIC | Age: 69
End: 2024-05-15
Payer: COMMERCIAL

## 2024-05-15 VITALS
DIASTOLIC BLOOD PRESSURE: 64 MMHG | RESPIRATION RATE: 16 BRPM | WEIGHT: 186 LBS | SYSTOLIC BLOOD PRESSURE: 114 MMHG | BODY MASS INDEX: 23.13 KG/M2 | OXYGEN SATURATION: 98 % | TEMPERATURE: 97.9 F | HEIGHT: 75 IN | HEART RATE: 80 BPM

## 2024-05-15 DIAGNOSIS — E53.8 VITAMIN B12 DEFICIENCY (NON ANEMIC): ICD-10-CM

## 2024-05-15 DIAGNOSIS — K50.80 CROHN'S DISEASE OF BOTH SMALL AND LARGE INTESTINE WITHOUT COMPLICATION (H): ICD-10-CM

## 2024-05-15 DIAGNOSIS — K22.2 ESOPHAGEAL STRICTURE: ICD-10-CM

## 2024-05-15 DIAGNOSIS — Z23 NEED FOR COVID-19 VACCINE: ICD-10-CM

## 2024-05-15 DIAGNOSIS — Z98.890 HISTORY OF RESECTION OF TERMINAL ILEUM: ICD-10-CM

## 2024-05-15 DIAGNOSIS — Z90.49 HISTORY OF RESECTION OF TERMINAL ILEUM: ICD-10-CM

## 2024-05-15 DIAGNOSIS — Z51.81 ENCOUNTER FOR THERAPEUTIC DRUG MONITORING: ICD-10-CM

## 2024-05-15 DIAGNOSIS — Z00.00 ENCOUNTER FOR WELLNESS EXAMINATION IN ADULT: Primary | ICD-10-CM

## 2024-05-15 DIAGNOSIS — Z12.5 SCREENING FOR PROSTATE CANCER: ICD-10-CM

## 2024-05-15 LAB
ALBUMIN SERPL BCG-MCNC: 3.9 G/DL (ref 3.5–5.2)
ALP SERPL-CCNC: 69 U/L (ref 40–150)
ALT SERPL W P-5'-P-CCNC: 14 U/L (ref 0–70)
ANION GAP SERPL CALCULATED.3IONS-SCNC: 8 MMOL/L (ref 7–15)
AST SERPL W P-5'-P-CCNC: 22 U/L (ref 0–45)
BILIRUB SERPL-MCNC: 0.9 MG/DL
BUN SERPL-MCNC: 15.7 MG/DL (ref 8–23)
CALCIUM SERPL-MCNC: 8.9 MG/DL (ref 8.8–10.2)
CHLORIDE SERPL-SCNC: 107 MMOL/L (ref 98–107)
CREAT SERPL-MCNC: 1.26 MG/DL (ref 0.67–1.17)
DEPRECATED HCO3 PLAS-SCNC: 26 MMOL/L (ref 22–29)
EGFRCR SERPLBLD CKD-EPI 2021: 62 ML/MIN/1.73M2
ERYTHROCYTE [DISTWIDTH] IN BLOOD BY AUTOMATED COUNT: 12.5 % (ref 10–15)
GLUCOSE SERPL-MCNC: 99 MG/DL (ref 70–99)
HCT VFR BLD AUTO: 39.5 % (ref 40–53)
HGB BLD-MCNC: 12.7 G/DL (ref 13.3–17.7)
MCH RBC QN AUTO: 30 PG (ref 26.5–33)
MCHC RBC AUTO-ENTMCNC: 32.2 G/DL (ref 31.5–36.5)
MCV RBC AUTO: 93 FL (ref 78–100)
PLATELET # BLD AUTO: 290 10E3/UL (ref 150–450)
POTASSIUM SERPL-SCNC: 4.7 MMOL/L (ref 3.4–5.3)
PROT SERPL-MCNC: 7.2 G/DL (ref 6.4–8.3)
PSA SERPL DL<=0.01 NG/ML-MCNC: 0.44 NG/ML (ref 0–4.5)
RBC # BLD AUTO: 4.24 10E6/UL (ref 4.4–5.9)
SODIUM SERPL-SCNC: 141 MMOL/L (ref 135–145)
VIT B12 SERPL-MCNC: 634 PG/ML (ref 232–1245)
WBC # BLD AUTO: 8.8 10E3/UL (ref 4–11)

## 2024-05-15 PROCEDURE — 80053 COMPREHEN METABOLIC PANEL: CPT | Performed by: INTERNAL MEDICINE

## 2024-05-15 PROCEDURE — 85027 COMPLETE CBC AUTOMATED: CPT | Performed by: INTERNAL MEDICINE

## 2024-05-15 PROCEDURE — 91320 SARSCV2 VAC 30MCG TRS-SUC IM: CPT | Performed by: INTERNAL MEDICINE

## 2024-05-15 PROCEDURE — 82607 VITAMIN B-12: CPT | Performed by: INTERNAL MEDICINE

## 2024-05-15 PROCEDURE — G0439 PPPS, SUBSEQ VISIT: HCPCS | Performed by: INTERNAL MEDICINE

## 2024-05-15 PROCEDURE — 36415 COLL VENOUS BLD VENIPUNCTURE: CPT | Performed by: INTERNAL MEDICINE

## 2024-05-15 PROCEDURE — G0103 PSA SCREENING: HCPCS | Performed by: INTERNAL MEDICINE

## 2024-05-15 PROCEDURE — 90480 ADMN SARSCOV2 VAC 1/ONLY CMP: CPT | Performed by: INTERNAL MEDICINE

## 2024-05-15 NOTE — PROGRESS NOTES
Preventive Care Visit  [facility]  Jorge Mooney MD, Internal Medicine  May 15, 2024          Elvin Castro   68 year old male    Date of Visit: 5/15/2024    Chief Complaint   Patient presents with    Physical     Subjective  68-year-old male with Crohn's disease here for adult wellness visit physical exam.  He remains active and walks on most days with good exertional ability.    Status post small bowel resection in 1989 in 1993, previous terminal ileum resected.  He has a colostomy.  Not planning takedown.  Ostomy output is stable, normal for him.  No pain or other issues.    No abdominal pain exacerbation.  Last colonoscopy April 2022 showed 2 ileal strictures.  No polyp.  5-year follow-up was recommended.    Saw gastroenterology in March of this year.  Normal hemoglobin of 13.6.  Normal liver test.    He had a perirectal abscess removed on the right in July of last year, that went well.    No recurrent rectal fistulas.    On Remicade long-term.  No mouth sores.  No history of skin cancer but recently saw dermatology for some actinic keratosis that were frozen on head.    He did see ophthalmology in March and normal eye exam.    He has a history of chronic tension headaches but no new or different headaches and just rare at this time.  Negative MRI March 2022    Patient is thin and denies daytime sleepiness.  No palpitations or significant lower extremity edema.    Low cardiac risk profile.  No chest pain or exertional symptoms.  February 2022 he had a negative stress test and no coronary calcium on CT scan.  He is never smoked.  Normal blood pressure.  Low LDL with ileal resection.    November 2022 DEXA scan with a spine score of -1.2 and femur score of -1.5/-1.9.    Is been no balance issues and no falls.    Family history of prostate cancer with father.  He denies any urinary symptoms and PSA level was 0.68 last year.    2022 negative for hepatitis C.    History of esophageal  stricture and he did get an EGD last week with dilatation.  The soreness is resolving.  He is swallowing better.  He is on Nexium.  No melena or GI bleeding reported.    No new cough or respiratory illness.    PMHx:    Past Medical History:   Diagnosis Date    Bowel obstruction (H)     Crohn's colitis (H)     History of anesthesia complications     Slow to wake    PONV (postoperative nausea and vomiting)     Primary osteoarthritis of both hands 11/6/2018     PSHx:    Past Surgical History:   Procedure Laterality Date    ABDOMEN SURGERY      x3    CARDIAC SURGERY      COLON SURGERY      COLONOSCOPY      EYE SURGERY      INCISION AND DRAINAGE OF WOUND N/A 6/15/2020    Procedure: EXAM UNDER ANESTHESIA WITH DRAINAGE OF ABSCESS;  Surgeon: Kim Ornelas MD;  Location: [facility];  Service: General    ZZC PART REMOVAL COLON W COLOSTOMY      Description: Partial Colectomy With Colostomy;  Recorded: 05/10/2008;     Immunizations:   Immunization History   Administered Date(s) Administered    COVID-19 Bivalent 12+ (Pfizer) 09/15/2022    COVID-19 MONOVALENT 12+ (Pfizer) 02/07/2021, 02/28/2021, 08/25/2021    COVID-19 Monovalent 12+ (Pfizer 2022) 02/05/2022    DT (PEDS <7y) 01/01/1990, 07/05/2000    Flu, Unspecified 10/15/2017, 10/22/2022    HepA, Unspecified 06/03/2008, 12/30/2008    HepB, Unspecified 06/03/2008, 06/30/2008, 12/30/2008    Hepatitis A (ADULT 19+) 06/03/2008, 12/30/2008    Hepatitis B, Adult 06/03/2008, 06/30/2008, 12/30/2008    Influenza (H1N1) 12/12/2009    Influenza (IIV3) PF 10/27/2004, 10/06/2014    Influenza Vaccine 18-64 (Flublok) 10/02/2019, 10/02/2020    Influenza Vaccine 65+ (Fluzone HD) 09/27/2021, 10/22/2022, 10/14/2023    Influenza Vaccine >6 months,quad, PF 10/01/2014, 10/01/2015, 09/26/2016, 10/15/2017    Influenza, seasonal, injectable, PF 10/04/2011    Influenza,INJ,MDCK,PF,Quad >6mo(Flucelvax) 10/15/2017, 10/01/2018    Pneumo Conj 13-V (2010&after) 05/02/2017    Pneumococcal 23 valent  "06/03/2008, 07/05/2013, 05/30/2014, 04/30/2019    Pneumococcal, Unspecified 05/30/2014, 04/30/2019    RSV Vaccine (Arexvy) 11/20/2023    TDAP (Adacel,Boostrix) 06/03/2008, 11/14/2018, 08/20/2020    Td,adult,historic,unspecified 06/03/2008       ROS A comprehensive review of systems was performed and was otherwise negative    Medications, allergies, and problem list were reviewed and updated    Exam  /64 (BP Location: Right arm, Patient Position: Sitting, Cuff Size: Adult Regular)   Pulse 80   Temp 97.9  F (36.6  C)   Resp 16   Ht 1.905 m (6' 3\")   Wt 84.4 kg (186 lb)   SpO2 98%   BMI 23.25 kg/m    Alert and oriented x 3.  Normal mobility.  Good muscle tone.  Normal mood and affect.  Clock face drawing and word recall normal.  Pupils and irises equal and reactive.  Extraocular muscles intact.  No jaundice or conjunctivitis.  External ears and nose exam is normal with minimal cerumen and normal tympanic membranes.  Pharynx is normal.  Teeth in good condition.  No oral lesions.  No thrush.  No cervical or supraclavicular or axillary adenopathy.  No JVD and no carotid bruits.  No thyromegaly or nodularity.  Lungs are clear to auscultation with good respiratory excursion.  Heart is regular with no murmur rub or gallop.  +1 pedal pulses bilaterally and no ankle edema.  Feet in good condition.  Abdomen is thin, soft and without significant tenderness.  Ostomy in the left lower quadrant looks healthy.  Minimal dermatitis around the ostomy adhesive.  Unable to do rectal exam with previous rectal surgery.  Skin exam without suspicious skin lesions noted    Assessment/Plan  1. Encounter for wellness examination in adult  Main focus for patient is his Crohn's disease which is controlled.  Other main issue is maintaining healthy exercise and muscle mass, which she is doing and I encouraged him to continue to exercise every day.    Low cardiovascular risk profile.    He is going to get a Shingrix vaccine at local " pharmacy.    Yearly eye exam in the spring.    Yearly dermatology exam in the fall.    See patient instructions.    Patient is full code    2. Crohn's disease of both small and large intestine without complication (H)  Controlled and managed by gastroenterology.  Long-term Remicade.    3. History of resection of terminal ileum  Noted    4. Screening for prostate cancer  PSA screening checked  5. Vitamin B12 deficiency (non anemic)  Continue B12 orally  - Vitamin B12    6. Esophageal stricture  Recent dilatation successful.  Continue Nexium long-term    7. Need for COVID-19 vaccine  Given today  - COVID-19 12+ (2023-24) (PFIZER)    8. Encounter for therapeutic drug monitoring    - CBC with platelets  - Comprehensive metabolic panel    History of tension headaches without change in pattern.  Negative MRI in 2022.      Return in about 1 year (around 5/15/2025) for Adult wellness visit physical exam.   Patient Instructions   No change in treatment plan.    Proceed with Shingrix vaccine at local pharmacy, but wait at least 2 weeks after your COVID shot.    Continue your Crohn's treatment with gastroenterology.    Your colonoscopy will be due April 2027.    See dermatology in the fall for your routine full-body skin exam.    Continue to see ophthalmology every spring for your eye exam.    Regular activity is important for maintaining the body's health.  Try to get at least 20 minutes of walking or similar activity every day.    Follow-up with me in 1 year for health maintenance physical exam.    Jorge Mooney MD, MD        Current Outpatient Medications   Medication Sig Dispense Refill    ascorbic acid, vitamin C, (VITAMIN C) 1000 MG tablet [ASCORBIC ACID, VITAMIN C, (VITAMIN C) 1000 MG TABLET] Take 1,000 mg by mouth daily.      calcium citrate (CALCITRATE) 200 mg (950 mg) tablet Take 2 tablets by mouth 2 times daily      cholecalciferol, vitamin D3, 1,000 unit tablet Take 2,000 Units by mouth daily      cyanocobalamin,  vitamin B-12, 2,500 mcg Tab Take 1,250 mcg by mouth daily      esomeprazole (NEXIUM) 20 MG capsule [ESOMEPRAZOLE (NEXIUM) 20 MG CAPSULE] Take 2 capsules by mouth.      inFLIXimab (REMICADE) 100 MG injection Administer Remicade 10mg/kg every six weeks, infuse by IV route      multivitamin w/minerals (THERA-VIT-M) tablet Take 1 tablet by mouth daily       Allergies   Allergen Reactions    Mercaptopurine Muscle Pain (Myalgia)     Extreme muscle weakness, required admission to hospital.      Social History     Tobacco Use    Smoking status: Never     Passive exposure: Never    Smokeless tobacco: Never   Substance Use Topics    Alcohol use: Yes     Alcohol/week: 1.0 standard drink of alcohol     Comment: Alcoholic Drinks/day: once a month    Drug use: No             Subjective   Sachin is a 68 year old, presenting for the following:  Physical        5/15/2024     8:09 AM   Additional Questions   Roomed by Denise         Health Care Directive  Patient does not have a Health Care Directive or Living Will: Advance Directive received and scanned. Click on Code in the patient header to view.    HPI              5/8/2024   General Health   How would you rate your overall physical health? (!) FAIR   Feel stress (tense, anxious, or unable to sleep) To some extent   (!) STRESS CONCERN      5/8/2024   Nutrition   Diet: Regular (no restrictions)         5/8/2024   Exercise   Days per week of moderate/strenous exercise 3 days   Average minutes spent exercising at this level 40 min         5/8/2024   Social Factors   Frequency of gathering with friends or relatives Three times a week   Worry food won't last until get money to buy more No   Food not last or not have enough money for food? No   Do you have housing?  Yes   Are you worried about losing your housing? No   Lack of transportation? No   Unable to get utilities (heat,electricity)? No         5/14/2024   Fall Risk   Fallen 2 or more times in the past year? No   Trouble with  walking or balance? No          5/8/2024   Activities of Daily Living- Home Safety   Needs help with the following daily activites None of the above   Safety concerns in the home None of the above         5/8/2024   Dental   Dentist two times every year? Yes         5/8/2024   Hearing Screening   Hearing concerns? (!) I FEEL THAT PEOPLE ARE MUMBLING OR NOT SPEAKING CLEARLY.    (!) I NEED TO ASK PEOPLE TO SPEAK UP OR REPEAT THEMSELVES.    (!) IT'S HARDER TO UNDERSTAND WOMEN'S VOICES THAN MEN'S VOICES.    (!) IT'S HARD TO FOLLOW A CONVERSATION IN A NOISY RESTAURANT OR CROWDED ROOM.    (!) TROUBLE UNDERSTANDING SOFT OR WHISPERED SPEECH.         5/8/2024   Driving Risk Screening   Patient/family members have concerns about driving No         5/8/2024   General Alertness/Fatigue Screening   Have you been more tired than usual lately? (!) YES         5/8/2024   Urinary Incontinence Screening   Bothered by leaking urine in past 6 months No         5/8/2024   TB Screening   Were you born outside of the [place]? No         Today's PHQ-2 Score:       5/14/2024    10:55 AM   PHQ-2 ( 1999 Pfizer)   Q1: Little interest or pleasure in doing things 0   Q2: Feeling down, depressed or hopeless 0   PHQ-2 Score 0   Q1: Little interest or pleasure in doing things Not at all   Q2: Feeling down, depressed or hopeless Not at all   PHQ-2 Score 0           5/8/2024   Substance Use   Alcohol more than 3/day or more than 7/wk No   Do you have a current opioid prescription? No   How severe/bad is pain from 1 to 10? 3/10   Do you use any other substances recreationally? No     Social History     Tobacco Use    Smoking status: Never     Passive exposure: Never    Smokeless tobacco: Never   Substance Use Topics    Alcohol use: Yes     Alcohol/week: 1.0 standard drink of alcohol     Comment: Alcoholic Drinks/day: once a month    Drug use: No           5/8/2024   AAA Screening   Family history of Abdominal Aortic Aneurysm (AAA)? No   Last PSA:  "  Prostate Specific Antigen Screen   Date Value Ref Range Status   04/14/2023 0.68 0.00 - 4.50 ng/mL Final   02/16/2022 0.56 0.00 - 4.50 ug/L Final     ASCVD Risk   The ASCVD Risk score (Melissa HARRISON, et al., 2019) failed to calculate for the following reasons:    Cannot find a previous total cholesterol lab            Reviewed and updated as needed this visit by Provider                      Current providers sharing in care for this patient include:  Patient Care Team:  Jorge Mooney MD as PCP - General (Internal Medicine)  Jorge Mooney MD as Assigned PCP    The following health maintenance items are reviewed in Epic and correct as of today:  Health Maintenance   Topic Date Due    ZOSTER IMMUNIZATION (1 of 2) Never done    LIPID  Never done    ANNUAL REVIEW OF HM ORDERS  02/24/2023    COVID-19 Vaccine (6 - 2023-24 season) 09/01/2023    MEDICARE ANNUAL WELLNESS VISIT  04/14/2024    Pneumococcal Vaccine: 65+ Years (4 of 4 - PPSV23 or PCV20) 04/30/2024    FALL RISK ASSESSMENT  05/15/2025    COLORECTAL CANCER SCREENING  11/08/2025    GLUCOSE  04/14/2026    ADVANCE CARE PLANNING  04/14/2028    DTAP/TDAP/TD IMMUNIZATION (4 - Td or Tdap) 08/20/2030    HEPATITIS C SCREENING  Completed    PHQ-2 (once per calendar year)  Completed    INFLUENZA VACCINE  Completed    RSV VACCINE (Pregnancy & 60+)  Completed    IPV IMMUNIZATION  Aged Out    HPV IMMUNIZATION  Aged Out    MENINGITIS IMMUNIZATION  Aged Out    RSV MONOCLONAL ANTIBODY  Aged Out            Objective    Exam  /64 (BP Location: Right arm, Patient Position: Sitting, Cuff Size: Adult Regular)   Pulse 80   Temp 97.9  F (36.6  C)   Resp 16   Ht 1.905 m (6' 3\")   Wt 84.4 kg (186 lb)   SpO2 98%   BMI 23.25 kg/m     Estimated body mass index is 23.25 kg/m  as calculated from the following:    Height as of this encounter: 1.905 m (6' 3\").    Weight as of this encounter: 84.4 kg (186 lb).    Physical Exam          5/15/2024   Mini Cog   Clock Draw Score " 2 Normal   3 Item Recall 3 objects recalled   Mini Cog Total Score 5              Signed Electronically by: Jorge Mooney MD

## 2024-05-15 NOTE — PATIENT INSTRUCTIONS
No change in treatment plan.    Proceed with Shingrix vaccine at local pharmacy, but wait at least 2 weeks after your COVID shot.    Continue your Crohn's treatment with gastroenterology.    Your colonoscopy will be due April 2027.    See dermatology in the fall for your routine full-body skin exam.    Continue to see ophthalmology every spring for your eye exam.    Regular activity is important for maintaining the body's health.  Try to get at least 20 minutes of walking or similar activity every day.    Follow-up with me in 1 year for health maintenance physical exam.

## 2024-05-24 ENCOUNTER — TRANSFERRED RECORDS (OUTPATIENT)
Dept: HEALTH INFORMATION MANAGEMENT | Facility: CLINIC | Age: 69
End: 2024-05-24
Payer: COMMERCIAL

## 2024-06-06 ENCOUNTER — TRANSFERRED RECORDS (OUTPATIENT)
Dept: HEALTH INFORMATION MANAGEMENT | Facility: CLINIC | Age: 69
End: 2024-06-06
Payer: COMMERCIAL

## 2024-07-09 ENCOUNTER — NURSE TRIAGE (OUTPATIENT)
Dept: INTERNAL MEDICINE | Facility: CLINIC | Age: 69
End: 2024-07-09
Payer: COMMERCIAL

## 2024-07-09 NOTE — TELEPHONE ENCOUNTER
Patient calls to update that wife just found out she is positive for Influenza A. Will keep appointment for Friday if symptoms aren't improving. No further questions at this time.

## 2024-07-09 NOTE — TELEPHONE ENCOUNTER
Nurse Triage SBAR    Is this a 2nd Level Triage? NO    Situation:  Persistent covid symptoms - the same since 6/3/24.      Background:  HX pneumonia    - Have Crohn's disease.     Pt had symptoms 6/29/24, was treated in emergency room 7/3/24, now symptoms persist. EKG and x-ray were okay on 7/3/24.     Assessment:    C/o knee and hip ache   Ear hurts as well as he is hard of hearing.   Cough now is clear - green color has lessen   Been congested.   Does report chest hurts from coughing and throat hurting from coughing as well.   Denies coughing up of blood or vomiting.   Able to speak in full sentences.   Is taking cough suppressant, Advil - effective for a short period of time.     1. COVID-19 ONSET: 6/29/24.   2. DIAGNOSIS CONFIRMATION: Hospital on 7/3/24 - tested positive.   3. MAIN SYMPTOM:  Labor breathing, body aches, feeling hot then cold, tired, cough.   5. BETTER-SAME-WORSE: The same. Better at times then symptoms returns.    6. RECENT MEDICAL VISIT: 7/3/24.   7. COUGH: Come and go, when coughing, the severity is at 9/10.   8. FEVER:  Does not have a thermometer at home. Report feeling hot then cold.   9. BREATHING DIFFICULTY:  MILD: No SOB at rest, mild SOB with walking, speaks normally in sentences, can lie down, no retractions, pulse < 100. Does at to wake up at night to sit on chair at times. Unable to sleep comfortably.   11. HIGH RISK DISEASE:  Pneumonia a couple of times. Crohn's disease.   12. VACCINE: been vaccinated.   13. PREGNANCY:  NA   14. O2 SATURATION MONITOR: NA    Protocol Recommended Disposition:   See in Office Within 2 Weeks    Recommendation:  Continue to monitor. To call back if symptoms worsening or if noted any new symptoms. Scheduled pt with pcp on Friday 7/12 for a follow-up if symptoms does not get better.     Does the patient meet one of the following criteria for ADS visit consideration? 16+ years old, with an [facility] PCP     TIP  Providers, please consider if this condition is  appropriate for management at one of our Acute and Diagnostic Services sites.     If patient is a good candidate, please use dotphrase <dot>triageresponse and select Refer to ADS to document.       Reason for Disposition   PERSISTING SYMPTOMS OF COVID-19 and symptoms SAME and medical visit for COVID-19 in past 2 weeks    Additional Information   Negative: SEVERE difficulty breathing (e.g., struggling for each breath, speaks in single words)   Negative: SEVERE weakness (e.g., can't stand or can barely walk) and new-onset or WORSE   Negative: Difficult to awaken or acting confused (e.g., disoriented, slurred speech)   Negative: Bluish (or gray) lips or face now   Negative: Sounds like a life-threatening emergency to the triager   Negative: Typical COVID-19 symptoms and lasting less than 3 weeks   Negative: Chest pain, pressure, or tightness and new-onset or worsening   Negative: Fever and new-onset or worsening   Negative: MODERATE difficulty breathing (e.g., speaks in phrases, SOB even at rest, pulse 100-120) and new-onset or WORSE   Negative: MODERATE difficulty breathing and oxygen level (e.g., pulse oximetry) 91 to 94%   Negative: Oxygen level (e.g., pulse oximetry) 90% or lower   Negative: MODERATE difficulty breathing (e.g., speaks in phrases, SOB even at rest, pulse 100-120)   Negative: Drinking very little and dehydration suspected (e.g., no urine > 12 hours, very dry mouth, very lightheaded)   Negative: Patient sounds very sick or weak to the triager   Negative: MILD difficulty breathing (e.g., minimal/no SOB at rest, SOB with walking, pulse <100) and new-onset   Negative: Oxygen level (e.g., pulse oximetry) 91 to 94%   Negative: PERSISTING SYMPTOMS OF COVID-19 and NEW symptom and could be serious   Negative: Caller has URGENT question and triager unable to answer question   Negative: PERSISTING SYMPTOMS OF COVID-19 and symptoms WORSE   Negative: Caller has NON-URGENT question and triager unable to answer    Negative: PERSISTING SYMPTOMS OF COVID-19 and NO medical visit for COVID-19 in past 2 weeks   Negative: Patient wants to be seen    Answer Assessment - Initial Assessment Questions  -    Protocols used: Coronavirus (COVID-19) Persisting Symptoms Follow-up Call-LUCY RAY RN

## 2024-07-10 PROCEDURE — 99284 EMERGENCY DEPT VISIT MOD MDM: CPT | Mod: 25

## 2024-07-10 ASSESSMENT — COLUMBIA-SUICIDE SEVERITY RATING SCALE - C-SSRS
1. IN THE PAST MONTH, HAVE YOU WISHED YOU WERE DEAD OR WISHED YOU COULD GO TO SLEEP AND NOT WAKE UP?: NO
2. HAVE YOU ACTUALLY HAD ANY THOUGHTS OF KILLING YOURSELF IN THE PAST MONTH?: NO
6. HAVE YOU EVER DONE ANYTHING, STARTED TO DO ANYTHING, OR PREPARED TO DO ANYTHING TO END YOUR LIFE?: NO

## 2024-07-11 ENCOUNTER — APPOINTMENT (OUTPATIENT)
Dept: RADIOLOGY | Facility: CLINIC | Age: 69
End: 2024-07-11
Attending: EMERGENCY MEDICINE
Payer: COMMERCIAL

## 2024-07-11 ENCOUNTER — HOSPITAL ENCOUNTER (EMERGENCY)
Facility: CLINIC | Age: 69
Discharge: HOME | End: 2024-07-11
Attending: EMERGENCY MEDICINE | Admitting: EMERGENCY MEDICINE
Payer: COMMERCIAL

## 2024-07-11 VITALS
BODY MASS INDEX: 23.74 KG/M2 | TEMPERATURE: 99.5 F | RESPIRATION RATE: 24 BRPM | WEIGHT: 185 LBS | HEIGHT: 74 IN | HEART RATE: 91 BPM | DIASTOLIC BLOOD PRESSURE: 70 MMHG | OXYGEN SATURATION: 93 % | SYSTOLIC BLOOD PRESSURE: 122 MMHG

## 2024-07-11 DIAGNOSIS — J12.82 PNEUMONIA DUE TO 2019 NOVEL CORONAVIRUS: ICD-10-CM

## 2024-07-11 DIAGNOSIS — U07.1 PNEUMONIA DUE TO 2019 NOVEL CORONAVIRUS: ICD-10-CM

## 2024-07-11 DIAGNOSIS — U07.1 COVID-19: ICD-10-CM

## 2024-07-11 PROCEDURE — 250N000011 HC RX IP 250 OP 636: Performed by: EMERGENCY MEDICINE

## 2024-07-11 PROCEDURE — 71046 X-RAY EXAM CHEST 2 VIEWS: CPT

## 2024-07-11 PROCEDURE — 250N000013 HC RX MED GY IP 250 OP 250 PS 637: Performed by: EMERGENCY MEDICINE

## 2024-07-11 RX ORDER — ONDANSETRON 4 MG/1
4 TABLET, ORALLY DISINTEGRATING ORAL ONCE
Status: COMPLETED | OUTPATIENT
Start: 2024-07-11 | End: 2024-07-11

## 2024-07-11 RX ORDER — IBUPROFEN 600 MG/1
600 TABLET, FILM COATED ORAL ONCE
Status: COMPLETED | OUTPATIENT
Start: 2024-07-11 | End: 2024-07-11

## 2024-07-11 RX ORDER — DEXAMETHASONE 4 MG/1
4 TABLET ORAL 2 TIMES DAILY WITH MEALS
Qty: 10 TABLET | Refills: 0 | Status: SHIPPED | OUTPATIENT
Start: 2024-07-11 | End: 2024-07-16

## 2024-07-11 RX ORDER — CEFDINIR 300 MG/1
300 CAPSULE ORAL 2 TIMES DAILY
Qty: 14 CAPSULE | Refills: 0 | Status: SHIPPED | OUTPATIENT
Start: 2024-07-11 | End: 2024-07-18

## 2024-07-11 RX ADMIN — IBUPROFEN 600 MG: 600 TABLET ORAL at 01:24

## 2024-07-11 RX ADMIN — ONDANSETRON 4 MG: 4 TABLET, ORALLY DISINTEGRATING ORAL at 01:24

## 2024-07-11 NOTE — ED PROVIDER NOTES
EMERGENCY DEPARTMENT ENCOUNTER      NAME: Elvin Castro  AGE: 68 year old male  YOB: 1955  MRN: [id number]  EVALUATION DATE & TIME: No admission date for patient encounter.    PCP: Jorge Mooney    ED PROVIDER: Issac Kumari M.D.      Chief Complaint   Patient presents with    Cough     COVID positive since 07/03/2024         FINAL IMPRESSION:  1. COVID-19    2. Pneumonia due to 2019 novel coronavirus          ED COURSE & MEDICAL DECISION MAKING:    Pertinent Labs & Imaging studies reviewed. (See chart for details)  68 year old male presents to the Emergency Department for evaluation of cough and shortness of breath.  Diagnosed with COVID a week ago.  No treatment for this.  Having worsening cough.  X-ray does show some diffuse infiltrates.  Will start him on Omnicef for possible bacterial superinfection.  Also given Decadron for inflammation control.  Oxygen saturations normal.  Vital signs otherwise unremarkable.  No history of lung disease.  Does take Remicade for Crohn's but has not gotten it for a month.  Patient otherwise well-appearing here.  Will discharge home    12:10 AM I met with the patient to gather history and to perform my initial exam. I discussed the plan for care while in the Emergency Department.       At the conclusion of the encounter I discussed the results of all of the tests and the disposition. The questions were answered. The patient or family acknowledged understanding and was agreeable with the care plan.     Medical Decision Making  Obtained supplemental history:Supplemental history obtained?: Documented in chart  Reviewed external records: External records reviewed?: Documented in chart and Outpatient Record: Clinic visit 7/3/2024 where he tested positive for COVID.  Care impacted by chronic illness:Other: crohns  Care significantly affected by social determinants of health:N/A  Did you consider but not order tests?: Work up considered but not performed and  documented in chart, if applicable  Did you interpret images independently?: Independent interpretation of ECG and images noted in documentation, when applicable.  Consultation discussion with other provider:Did you involve another provider (consultant, , pharmacy, etc.)?: No  Discharge. I prescribed additional prescription strength medication(s) as charted. See documentation for any additional details.       MEDICATIONS GIVEN IN THE EMERGENCY:  Medications   ibuprofen (ADVIL/MOTRIN) tablet 600 mg (600 mg Oral $Given 7/11/24 0124)   ondansetron (ZOFRAN ODT) ODT tab 4 mg (4 mg Oral $Given 7/11/24 0124)       NEW PRESCRIPTIONS STARTED AT TODAY'S ER VISIT  Discharge Medication List as of 7/11/2024  2:34 AM        START taking these medications    Details   cefdinir (OMNICEF) 300 MG capsule Take 1 capsule (300 mg) by mouth 2 times daily for 7 days, Disp-14 capsule, R-0, Local Print      dexAMETHasone (DECADRON) 4 MG tablet Take 1 tablet (4 mg) by mouth 2 times daily (with meals) for 5 days, Disp-10 tablet, R-0, Local Print                =================================================================    HPI    Patient information was obtained from: Patient        Elvin Castro is a 68 year old male with a pertinent history of crohns who presents to this ED for evaluation of cough body aches nausea.  Patient's symptoms started at the beginning of July.  On the third was seen and diagnosed with COVID.  Has continued have body aches cough some nausea and vomiting which is new tonight.  Also having chills.  Has been taking Tylenol and ibuprofen at home.  Has helped intermittently.  Was not treated with Paxlovid.  Is on Remicade for Crohn's.  Normal bowel and bladder.  Did recently travel.        PAST MEDICAL HISTORY:  Past Medical History:   Diagnosis Date    Bowel obstruction (H)     Crohn's colitis (H)     History of anesthesia complications     Slow to wake    PONV (postoperative nausea and vomiting)     Primary  osteoarthritis of both hands 11/6/2018       PAST SURGICAL HISTORY:  Past Surgical History:   Procedure Laterality Date    ABDOMEN SURGERY      x3    CARDIAC SURGERY      COLON SURGERY      COLONOSCOPY      EYE SURGERY      INCISION AND DRAINAGE OF WOUND N/A 6/15/2020    Procedure: EXAM UNDER ANESTHESIA WITH DRAINAGE OF ABSCESS;  Surgeon: Kim Ornelas MD;  Location: [facility];  Service: General    [facility] PART REMOVAL COLON W COLOSTOMY      Description: Partial Colectomy With Colostomy;  Recorded: 05/10/2008;           CURRENT MEDICATIONS:    No current facility-administered medications for this encounter.     Current Outpatient Medications   Medication Sig Dispense Refill    cefdinir (OMNICEF) 300 MG capsule Take 1 capsule (300 mg) by mouth 2 times daily for 7 days 14 capsule 0    dexAMETHasone (DECADRON) 4 MG tablet Take 1 tablet (4 mg) by mouth 2 times daily (with meals) for 5 days 10 tablet 0    ascorbic acid, vitamin C, (VITAMIN C) 1000 MG tablet [ASCORBIC ACID, VITAMIN C, (VITAMIN C) 1000 MG TABLET] Take 1,000 mg by mouth daily.      calcium citrate (CALCITRATE) 200 mg (950 mg) tablet Take 2 tablets by mouth 2 times daily      cholecalciferol, vitamin D3, 1,000 unit tablet Take 2,000 Units by mouth daily      cyanocobalamin, vitamin B-12, 2,500 mcg Tab Take 1,250 mcg by mouth daily      esomeprazole (NEXIUM) 20 MG capsule [ESOMEPRAZOLE (NEXIUM) 20 MG CAPSULE] Take 2 capsules by mouth.      inFLIXimab (REMICADE) 100 MG injection Administer Remicade 10mg/kg every six weeks, infuse by IV route      multivitamin w/minerals (THERA-VIT-M) tablet Take 1 tablet by mouth daily           ALLERGIES:  Allergies   Allergen Reactions    Mercaptopurine Muscle Pain (Myalgia)     Extreme muscle weakness, required admission to hospital.        FAMILY HISTORY:  No family history on file.    SOCIAL HISTORY:   Social History     Socioeconomic History    Marital status:    Tobacco Use    Smoking status: Never      Passive exposure: Never    Smokeless tobacco: Never   Substance and Sexual Activity    Alcohol use: Yes     Alcohol/week: 1.0 standard drink of alcohol     Comment: Alcoholic Drinks/day: once a month    Drug use: No    Sexual activity: Yes     Social Determinants of Health     Financial Resource Strain: Low Risk  (5/8/2024)    Financial Resource Strain     Within the past 12 months, have you or your family members you live with been unable to get utilities (heat, electricity) when it was really needed?: No   Food Insecurity: No Food Insecurity (6/8/2024)    Received from [facility]    Hunger Vital Sign     Worried About Running Out of Food in the Last Year: Never true     Ran Out of Food in the Last Year: Never true   Transportation Needs: No Transportation Needs (6/8/2024)    Received from [facility]    PRAPARE - Transportation     Lack of Transportation (Medical): No     Lack of Transportation (Non-Medical): No   Physical Activity: Sufficiently Active (6/8/2024)    Received from [facility]    Exercise Vital Sign     Days of Exercise per Week: 4 days     Minutes of Exercise per Session: 60 min   Recent Concern: Physical Activity - Insufficiently Active (5/8/2024)    Exercise Vital Sign     Days of Exercise per Week: 3 days     Minutes of Exercise per Session: 40 min   Stress: Stress Concern Present (5/8/2024)    [employer] [place] [employer] - Occupational Stress Questionnaire     Feeling of Stress : To some extent   Social Connections: Unknown (5/8/2024)    Social Connection and Isolation Panel [NHANES]     Frequency of Social Gatherings with Friends and Family: Three times a week   Interpersonal Safety: Low Risk  (5/15/2024)    Interpersonal Safety     Do you feel physically and emotionally safe where you currently live?: Yes     Within the past 12 months, have you been hit, slapped, kicked or otherwise physically hurt by someone?: No     Within the past 12 months, have you been humiliated or  "emotionally abused in other ways by your partner or ex-partner?: No   Housing Stability: Low Risk  (6/8/2024)    Received from [facility]    Housing Stability     What is your living situation today?: I have a steady place to live       VITALS:  /70   Pulse 91   Temp 99.5  F (37.5  C) (Oral)   Resp 24   Ht 1.88 m (6' 2\")   Wt 83.9 kg (185 lb)   SpO2 93%   BMI 23.75 kg/m      PHYSICAL EXAM    Physical Exam  Vitals and nursing note reviewed.   Constitutional:       General: He is not in acute distress.     Appearance: He is not diaphoretic.   HENT:      Head: Atraumatic.   Eyes:      General: No scleral icterus.     Pupils: Pupils are equal, round, and reactive to light.   Cardiovascular:      Rate and Rhythm: Normal rate and regular rhythm.      Heart sounds: Normal heart sounds.   Pulmonary:      Effort: No respiratory distress.      Breath sounds: Normal breath sounds.   Abdominal:      Palpations: Abdomen is soft.      Tenderness: There is no abdominal tenderness.   Musculoskeletal:         General: No tenderness.   Lymphadenopathy:      Cervical: No cervical adenopathy.   Skin:     General: Skin is warm.      Findings: No rash.           LAB:  All pertinent labs reviewed and interpreted.  Labs Ordered and Resulted from Time of ED Arrival to Time of ED Departure - No data to display    RADIOLOGY:  Reviewed all pertinent imaging. Please see official radiology report.  Chest XR,  PA & LAT   Final Result   IMPRESSION: Subtle reticulonodular opacities at the lung bases which could represent mild infectious/inflammatory process. No large dense focal pulmonary consolidation, or significant pleural effusion. No pneumothorax. Normal heart size without pulmonary    vascular congestion. No acute osseous abnormality.            Issac Kumari M.D.  Emergency Medicine  [facility] EMERGENCY ROOM  [address] " [id number]  [phone]  Dept: [phone]       Issac Kumari MD  07/11/24 0444

## 2024-07-11 NOTE — ED TRIAGE NOTES
Patient presents to the ED complaining of COVID symptoms.  Patient was seen last Wednesday in the ED and tested positive for COVID.  Continues to have cough, generalized body aches, fever, and chills.       Triage Assessment (Adult)       Row Name 07/10/24 4068          Triage Assessment    Airway WDL WDL        Respiratory WDL    Respiratory WDL X;cough     Cough Frequency frequent     Cough Type congested;productive  clear mucus        Skin Circulation/Temperature WDL    Skin Circulation/Temperature WDL WDL        Cardiac WDL    Cardiac WDL WDL        Peripheral/Neurovascular WDL    Peripheral Neurovascular WDL WDL        Cognitive/Neuro/Behavioral WDL    Cognitive/Neuro/Behavioral WDL WDL

## 2024-07-12 ENCOUNTER — OFFICE VISIT (OUTPATIENT)
Dept: INTERNAL MEDICINE | Facility: CLINIC | Age: 69
End: 2024-07-12
Payer: COMMERCIAL

## 2024-07-12 VITALS
WEIGHT: 182.5 LBS | DIASTOLIC BLOOD PRESSURE: 77 MMHG | TEMPERATURE: 98.2 F | RESPIRATION RATE: 20 BRPM | SYSTOLIC BLOOD PRESSURE: 137 MMHG | HEIGHT: 74 IN | HEART RATE: 70 BPM | OXYGEN SATURATION: 95 % | BODY MASS INDEX: 23.42 KG/M2

## 2024-07-12 DIAGNOSIS — U07.1 PNEUMONIA DUE TO 2019 NOVEL CORONAVIRUS: Primary | ICD-10-CM

## 2024-07-12 DIAGNOSIS — E87.6 HYPOKALEMIA: ICD-10-CM

## 2024-07-12 DIAGNOSIS — K50.819 CROHN'S DISEASE OF SMALL AND LARGE INTESTINES WITH COMPLICATION (H): ICD-10-CM

## 2024-07-12 DIAGNOSIS — J12.82 PNEUMONIA DUE TO 2019 NOVEL CORONAVIRUS: Primary | ICD-10-CM

## 2024-07-12 LAB
ANION GAP SERPL CALCULATED.3IONS-SCNC: 12 MMOL/L (ref 7–15)
BUN SERPL-MCNC: 13.6 MG/DL (ref 8–23)
CALCIUM SERPL-MCNC: 8.2 MG/DL (ref 8.8–10.2)
CHLORIDE SERPL-SCNC: 102 MMOL/L (ref 98–107)
CREAT SERPL-MCNC: 1.33 MG/DL (ref 0.67–1.17)
DEPRECATED HCO3 PLAS-SCNC: 25 MMOL/L (ref 22–29)
EGFRCR SERPLBLD CKD-EPI 2021: 58 ML/MIN/1.73M2
GLUCOSE SERPL-MCNC: 122 MG/DL (ref 70–99)
POTASSIUM SERPL-SCNC: 3.8 MMOL/L (ref 3.4–5.3)
SODIUM SERPL-SCNC: 139 MMOL/L (ref 135–145)

## 2024-07-12 PROCEDURE — 80048 BASIC METABOLIC PNL TOTAL CA: CPT | Performed by: INTERNAL MEDICINE

## 2024-07-12 PROCEDURE — 36415 COLL VENOUS BLD VENIPUNCTURE: CPT | Performed by: INTERNAL MEDICINE

## 2024-07-12 PROCEDURE — 99214 OFFICE O/P EST MOD 30 MIN: CPT | Performed by: INTERNAL MEDICINE

## 2024-07-12 RX ORDER — ONDANSETRON 4 MG/1
4 TABLET, ORALLY DISINTEGRATING ORAL EVERY 6 HOURS PRN
COMMUNITY
Start: 2024-06-07

## 2024-07-12 NOTE — PATIENT INSTRUCTIONS
Complete your current course of steroids and antibiotics that have been given to you.  This should cover a secondary infection such as sinusitis or bronchitis.    I would anticipate you will continue to have improvement of your COVID symptoms over the next 1-2 weeks.  You should not be getting more short of breath, or having fevers above 101 degrees.  If you do have that, get reevaluated.    Remain active with regular walking and stretching to help recondition the body after your illness.    Yogurt and/or probiotic recommended while on antibiotics and for 1 week after.    Check your [employer] computer portal for results of your lab work today.  If your potassium is still low, you may need additional potassium supplement.  Continue on a high potassium diet.  Stay well-hydrated.    Follow-up next year for yearly physical

## 2024-07-12 NOTE — PROGRESS NOTES
Elvin Castro   68 year old male    Date of Visit: 7/12/2024    Chief Complaint   Patient presents with    Other     Covid pneumonia. Still having bad symptoms- seen in ED Wed night/early yesterday 7/11. Tested negative at home yesterday.      Subjective  68-year-old male with Crohn's disease on Remicade immunosuppression.  He was on an [place] cruise and became sick with COVID, diagnosed July 3 in [place].  On July 3 his potassium was 2.4 with elevated creatinine of 1.4.  He was not treated with Paxlovid.  He continued to have night sweats.  Low-grade fevers.  Mild increasing shortness of breath but more prominent fatigue.  Continued cough is minimally productive.    2 days ago began having some increasing thickness to his cough.  He was seen in the emergency room yesterday.  He was given Omnicef for 7 days and a 5-day course of dexamethasone.  He is starting to feel better today.    Some mild increasing nasal congestion and ear pain but that is also better today.    No chest pain or lower extremity edema.  No past history of DVT.    No flare of his Crohn's.    He is a non-smoker and does not have a history of sleep apnea.    PMHx:    Past Medical History:   Diagnosis Date    Bowel obstruction (H)     Crohn's colitis (H)     History of anesthesia complications     Slow to wake    PONV (postoperative nausea and vomiting)     Primary osteoarthritis of both hands 11/6/2018     PSHx:    Past Surgical History:   Procedure Laterality Date    ABDOMEN SURGERY      x3    CARDIAC SURGERY      COLON SURGERY      COLONOSCOPY      EYE SURGERY      INCISION AND DRAINAGE OF WOUND N/A 6/15/2020    Procedure: EXAM UNDER ANESTHESIA WITH DRAINAGE OF ABSCESS;  Surgeon: Kim Ornelas MD;  Location: [facility];  Service: General    ZZC PART REMOVAL COLON W COLOSTOMY      Description: Partial Colectomy With Colostomy;  Recorded: 05/10/2008;     Immunizations:   Immunization History   Administered Date(s) Administered     "COVID-19 12+ (2023-24) (Pfizer) 05/15/2024    COVID-19 Bivalent 12+ (Pfizer) 09/15/2022    COVID-19 MONOVALENT 12+ (Pfizer) 02/07/2021, 02/28/2021, 08/25/2021    COVID-19 Monovalent 12+ (Pfizer 2022) 02/05/2022    DT (PEDS <7y) 01/01/1990, 07/05/2000    Flu, Unspecified 10/15/2017, 10/22/2022    HepA, Unspecified 06/03/2008, 12/30/2008    HepB, Unspecified 06/03/2008, 06/30/2008, 12/30/2008    Hepatitis A (ADULT 19+) 06/03/2008, 12/30/2008    Hepatitis B, Adult 06/03/2008, 06/30/2008, 12/30/2008    Influenza (H1N1) 12/12/2009    Influenza (IIV3) PF 10/27/2004, 10/06/2014    Influenza Vaccine 18-64 (Flublok) 10/02/2019, 10/02/2020    Influenza Vaccine 65+ (Fluzone HD) 09/27/2021, 10/22/2022, 10/14/2023    Influenza Vaccine >6 months,quad, PF 10/01/2014, 10/01/2015, 09/26/2016, 10/15/2017    Influenza, seasonal, injectable, PF 10/04/2011    Influenza,INJ,MDCK,PF,Quad >6mo(Flucelvax) 10/15/2017, 10/01/2018    Pneumo Conj 13-V (2010&after) 05/02/2017    Pneumococcal 23 valent 06/03/2008, 07/05/2013, 05/30/2014, 04/30/2019    Pneumococcal, Unspecified 05/30/2014, 04/30/2019    RSV Vaccine (Arexvy) 11/20/2023    TDAP (Adacel,Boostrix) 06/03/2008, 11/14/2018, 08/20/2020    Td,adult,historic,unspecified 06/03/2008       ROS A comprehensive review of systems was performed and was otherwise negative    Medications, allergies, and problem list were reviewed and updated    Exam  /77 (BP Location: Right arm, Patient Position: Sitting, Cuff Size: Adult Regular)   Pulse 70   Temp 98.2  F (36.8  C) (Oral)   Resp 20   Ht 1.88 m (6' 2\")   Wt 82.8 kg (182 lb 8 oz)   SpO2 95%   BMI 23.43 kg/m    Lungs are clear to auscultation.  I did not hear any bibasilar crackles.  No rhonchi or wheezing.  Heart is regular without murmur.  Abdomen is nontender no edema.  No rash.    Assessment/Plan  1. Pneumonia due to 2019 novel coronavirus  Previous chest x-ray did show some slight bibasilar reticular opacities consistent with " COVID-pneumonia.  But he is recovering at this time, though having some moderate persistent symptoms.    He is currently being treated for secondary infection with Omnicef and dexamethasone, and feeling better.  Proceed with that treatment.  He was warned about signs and symptoms of additional secondary infection and if worsening symptoms to get reevaluated.    Of note his wife was just in the hospital for influenza A    2. Hypokalemia  Consistent with acute illness and dehydration in [place].  He has been eating bananas and high potassium diet.  Anticipate this is cleared, but check labs  - Basic metabolic panel    3. Crohn's disease of small and large intestines with complication (H)  No flare.  On Remicade      Return in about 11 months (around 6/16/2025) for Yearly adult wellness visit physical exam.   Patient Instructions   Complete your current course of steroids and antibiotics that have been given to you.  This should cover a secondary infection such as sinusitis or bronchitis.    I would anticipate you will continue to have improvement of your COVID symptoms over the next 1-2 weeks.  You should not be getting more short of breath, or having fevers above 101 degrees.  If you do have that, get reevaluated.    Remain active with regular walking and stretching to help recondition the body after your illness.    Yogurt and/or probiotic recommended while on antibiotics and for 1 week after.    Check your [employer] computer portal for results of your lab work today.  If your potassium is still low, you may need additional potassium supplement.  Continue on a high potassium diet.  Stay well-hydrated.    Follow-up next year for yearly physical    Jorge Mooney MD, MD        Current Outpatient Medications   Medication Sig Dispense Refill    ascorbic acid, vitamin C, (VITAMIN C) 1000 MG tablet [ASCORBIC ACID, VITAMIN C, (VITAMIN C) 1000 MG TABLET] Take 1,000 mg by mouth daily.      calcium citrate (CALCITRATE) 200 mg  (950 mg) tablet Take 2 tablets by mouth 2 times daily      cefdinir (OMNICEF) 300 MG capsule Take 1 capsule (300 mg) by mouth 2 times daily for 7 days 14 capsule 0    cholecalciferol, vitamin D3, 1,000 unit tablet Take 2,000 Units by mouth daily      cyanocobalamin, vitamin B-12, 2,500 mcg Tab Take 1,250 mcg by mouth daily      dexAMETHasone (DECADRON) 4 MG tablet Take 1 tablet (4 mg) by mouth 2 times daily (with meals) for 5 days 10 tablet 0    esomeprazole (NEXIUM) 20 MG capsule [ESOMEPRAZOLE (NEXIUM) 20 MG CAPSULE] Take 2 capsules by mouth.      inFLIXimab (REMICADE) 100 MG injection Administer Remicade 10mg/kg every six weeks, infuse by IV route      multivitamin w/minerals (THERA-VIT-M) tablet Take 1 tablet by mouth daily      ondansetron (ZOFRAN ODT) 4 MG ODT tab Take 4 mg by mouth every 6 hours as needed for nausea       Allergies   Allergen Reactions    Mercaptopurine Muscle Pain (Myalgia)     Extreme muscle weakness, required admission to hospital.      Social History     Tobacco Use    Smoking status: Never     Passive exposure: Never    Smokeless tobacco: Never   Substance Use Topics    Alcohol use: Yes     Alcohol/week: 1.0 standard drink of alcohol     Comment: Alcoholic Drinks/day: once a month    Drug use: No             Subjective   Sachin is a 68 year old, presenting for the following health issues:  Other (Covid pneumonia. Still having bad symptoms- seen in ED Wed night/early yesterday 7/11. Tested negative at home yesterday. )        7/12/2024     8:49 AM   Additional Questions   Roomed by Elis     History of Present Illness       Reason for visit:  Covid follow up  Symptom onset:  1-2 weeks ago  Symptoms include:  Covid pneumonia  Symptom intensity:  Severe  Symptom progression:  Staying the same  Had these symptoms before:  No  What makes it worse:  No  What makes it better:  Tylonol advil    He eats 2-3 servings of fruits and vegetables daily.He consumes 3 sweetened beverage(s) daily.He  "exercises with enough effort to increase his heart rate 20 to 29 minutes per day.  He exercises with enough effort to increase his heart rate 3 or less days per week.   He is taking medications regularly.                     Objective    /77 (BP Location: Right arm, Patient Position: Sitting, Cuff Size: Adult Regular)   Pulse 70   Temp 98.2  F (36.8  C) (Oral)   Resp 20   Ht 1.88 m (6' 2\")   Wt 82.8 kg (182 lb 8 oz)   SpO2 95%   BMI 23.43 kg/m    Body mass index is 23.43 kg/m .  Physical Exam               Signed Electronically by: Jorge Mooney MD    "

## 2024-07-15 ENCOUNTER — TELEPHONE (OUTPATIENT)
Dept: INTERNAL MEDICINE | Facility: CLINIC | Age: 69
End: 2024-07-15
Payer: COMMERCIAL

## 2024-07-15 NOTE — TELEPHONE ENCOUNTER
I can accept her as a new patient, but she will need to schedule a 40-minute new patient appointment in order to get established.

## 2024-07-19 ENCOUNTER — TRANSFERRED RECORDS (OUTPATIENT)
Dept: HEALTH INFORMATION MANAGEMENT | Facility: CLINIC | Age: 69
End: 2024-07-19
Payer: COMMERCIAL

## 2024-07-19 LAB
ALT SERPL-CCNC: 29 IU/L (ref 0–44)
AST SERPL-CCNC: 28 IU/L (ref 0–40)

## 2024-07-24 NOTE — PRE-PROCEDURE
Procedure Name: abscessogram  Date/Time: 9/8/2020 9:04 AM  Written consent obtained?: Yes  Risks and benefits: Risks, benefits and alternatives were discussed  Consent given by: patient  Expected level of sedation: moderate  ASA Class: Class 3- Severe systemic disease, definite functional limitations  Mallampati: Grade 3- soft palate visible, posterior pharyngeal wall not visible  Patient states understanding of procedure being performed: Yes  Patient's understanding of procedure matches consent: Yes  Procedure consent matches procedure scheduled: Yes  Appropriately NPO: yes  Lungs: lungs clear with good breath sounds bilaterally  Heart: normal heart sounds and rate  History & Physical reviewed: History and physical reviewed and no updates needed  Statement of review: I have reviewed the lab findings, diagnostic data, medications, and the plan for sedation       "39w6d lo Matias denies contractions, bleeding, or decreased fetal movement. States she feels like she's been \"leaking for weeks\" and wonders if we can tell if it's just urine. Denies any big gushes of fluid and denies any vaginal itching, burning, or odor. Requests cervical exam and membrane sweep today.   Sterile speculum exam performed and revealed moderate amount of cloudy physiologic discharge at os and in vaginal vault. Negative pooling, negative valsalva. ROM+ collected and negative.   Cervix 2cm/50%/-2, membranes swept with pt consent.     Results for orders placed or performed in visit on 07/24/24   Rupture of Fetal Membranes by ROM Plus     Status: Normal   Result Value Ref Range    Rupture of Fetal Membranes by ROM Plus Negative Negative, Invalid, Suggest Repeat    Narrative    It is recommended that the tests to detect rupture of the amniotic membranes should not be used without other clinical assessments to make clinical patient management decision.       States she would like to avoid induction but would consider at 41wks. Last baby born spontaneously at 41w1d ega after a membrane sweep. She agrees to CNM visit, NST, KARMA and induction planning next week. Reviewed labor precautions and triage phone number.     RTC weekly  RUBEN [place], RICH    "

## 2024-08-27 ENCOUNTER — TRANSFERRED RECORDS (OUTPATIENT)
Dept: HEALTH INFORMATION MANAGEMENT | Facility: CLINIC | Age: 69
End: 2024-08-27
Payer: COMMERCIAL

## 2024-09-04 ENCOUNTER — TRANSFERRED RECORDS (OUTPATIENT)
Dept: HEALTH INFORMATION MANAGEMENT | Facility: CLINIC | Age: 69
End: 2024-09-04
Payer: COMMERCIAL

## 2024-10-08 ENCOUNTER — TRANSFERRED RECORDS (OUTPATIENT)
Dept: HEALTH INFORMATION MANAGEMENT | Facility: CLINIC | Age: 69
End: 2024-10-08
Payer: COMMERCIAL

## 2024-11-01 ENCOUNTER — OFFICE VISIT (OUTPATIENT)
Dept: URGENT CARE | Facility: URGENT CARE | Age: 69
End: 2024-11-01
Payer: COMMERCIAL

## 2024-11-01 ENCOUNTER — NURSE TRIAGE (OUTPATIENT)
Dept: INTERNAL MEDICINE | Facility: CLINIC | Age: 69
End: 2024-11-01

## 2024-11-01 VITALS
SYSTOLIC BLOOD PRESSURE: 120 MMHG | RESPIRATION RATE: 16 BRPM | HEART RATE: 67 BPM | OXYGEN SATURATION: 97 % | DIASTOLIC BLOOD PRESSURE: 78 MMHG | WEIGHT: 185 LBS | BODY MASS INDEX: 23.75 KG/M2 | TEMPERATURE: 97.9 F

## 2024-11-01 DIAGNOSIS — M54.50 ACUTE RIGHT-SIDED LOW BACK PAIN WITHOUT SCIATICA: Primary | ICD-10-CM

## 2024-11-01 DIAGNOSIS — K50.819 CROHN'S DISEASE OF SMALL AND LARGE INTESTINES WITH COMPLICATION (H): ICD-10-CM

## 2024-11-01 LAB
ALBUMIN UR-MCNC: 100 MG/DL
APPEARANCE UR: CLEAR
BACTERIA #/AREA URNS HPF: ABNORMAL /HPF
BILIRUB UR QL STRIP: NEGATIVE
COLOR UR AUTO: YELLOW
GLUCOSE UR STRIP-MCNC: NEGATIVE MG/DL
HGB UR QL STRIP: NEGATIVE
KETONES UR STRIP-MCNC: NEGATIVE MG/DL
LEUKOCYTE ESTERASE UR QL STRIP: NEGATIVE
NITRATE UR QL: NEGATIVE
PH UR STRIP: 6 [PH] (ref 5–8)
RBC #/AREA URNS AUTO: ABNORMAL /HPF
SP GR UR STRIP: 1.02 (ref 1–1.03)
SQUAMOUS #/AREA URNS AUTO: ABNORMAL /LPF
UROBILINOGEN UR STRIP-ACNC: 0.2 E.U./DL
WBC #/AREA URNS AUTO: ABNORMAL /HPF

## 2024-11-01 PROCEDURE — 81001 URINALYSIS AUTO W/SCOPE: CPT | Performed by: FAMILY MEDICINE

## 2024-11-01 PROCEDURE — 99214 OFFICE O/P EST MOD 30 MIN: CPT | Performed by: FAMILY MEDICINE

## 2024-11-01 RX ORDER — CYCLOBENZAPRINE HCL 10 MG
10 TABLET ORAL 3 TIMES DAILY PRN
Qty: 20 TABLET | Refills: 0 | Status: SHIPPED | OUTPATIENT
Start: 2024-11-01

## 2024-11-01 NOTE — TELEPHONE ENCOUNTER
Nurse Triage SBAR    Is this a 2nd Level Triage? no    Situation: Pt calling with symptoms, requesting appointment.    Background: Pt states on Tuesday night, he began feeling pain in the right side near waistline and right flank and nausea. Pain radiates to right buttock. Pt recalls he may have pulled muscles on Tuesday. History of crohn's.    Assessment: Pt states pain is constant. Relieved with heat and acetaminophen. Walking seems to lessen pain. Last acetaminophen taken at 8am this morning and pain starting to increase to moderate level. Has felt nauseous. Denies fever, vomiting. Normal stools. Urinating normal. Ate breakfast.     Protocol Recommended Disposition:   See in Office Today    Recommendation: No appointments available. Pt will go to urgent care for evaluation.           Does the patient meet one of the following criteria for ADS visit consideration? No   Reason for Disposition   MODERATE pain (e.g., interferes with normal activities) that comes and goes (cramps) lasts > 24 hours  (Exception: Pain with Vomiting or Diarrhea - see that Protocol.)    Additional Information   Negative: Passed out (i.e., fainted, collapsed and was not responding)   Negative: Shock suspected (e.g., cold/pale/clammy skin, too weak to stand, low BP, rapid pulse)   Negative: Sounds like a life-threatening emergency to the triager   Negative: Followed an abdomen (stomach) injury   Negative: Chest pain   Negative: Pain is mainly in upper abdomen (if needed ask: 'is it mainly above the belly button?')   Negative: Abdomen bloating or swelling are main symptoms   Negative: SEVERE abdominal pain (e.g., excruciating)   Negative: Vomiting red blood or black (coffee ground) material   Negative: Blood in bowel movements  (Exception: Blood on surface of BM with constipation.)   Negative: Black or tarry bowel movements  (Exception: Chronic-unchanged black-grey BMs AND is taking iron pills or Pepto-Bismol.)   Negative: Unable to urinate  "(or only a few drops) and bladder feels very full   Negative: Pain in scrotum persists > 1 hour   Negative: MILD TO MODERATE constant pain lasting > 2 hours   Negative: Vomiting bile (green color)   Negative: Patient sounds very sick or weak to the triager   Negative: Vomiting and abdomen looks much more swollen than usual   Negative: White of the eyes have turned yellow (i.e., jaundice)   Negative: Blood in urine (red, pink, or tea-colored)   Negative: Fever > 103 F (39.4 C)   Negative: Fever > 101 F (38.3 C) and over 60 years of age   Negative: Fever > 100.0 F (37.8 C) and has diabetes mellitus or a weak immune system (e.g., HIV positive, cancer chemotherapy, organ transplant, splenectomy, chronic steroids)   Negative: Fever > 100.0 F (37.8 C) and bedridden (e.g., CVA, chronic illness, recovering from surgery)    Answer Assessment - Initial Assessment Questions  1. LOCATION: \"Where does it hurt?\"       Right side near waist band and right flank  2. RADIATION: \"Does the pain shoot anywhere else?\" (e.g., chest, back)      Into right buttock  3. ONSET: \"When did the pain begin?\" (Minutes, hours or days ago)       Tuesday night  4. SUDDEN: \"Gradual or sudden onset?\"      sudden  5. PATTERN \"Does the pain come and go, or is it constant?\"     - If it comes and goes: \"How long does it last?\" \"Do you have pain now?\"      (Note: Comes and goes means the pain is intermittent. It goes away completely between bouts.)     - If constant: \"Is it getting better, staying the same, or getting worse?\"       (Note: Constant means the pain never goes away completely; most serious pain is constant and gets worse.)       constant  6. SEVERITY: \"How bad is the pain?\"  (e.g., Scale 1-10; mild, moderate, or severe)     - MILD (1-3): Doesn't interfere with normal activities, abdomen soft and not tender to touch.      - MODERATE (4-7): Interferes with normal activities or awakens from sleep, abdomen tender to touch.      - SEVERE (8-10): " "Excruciating pain, doubled over, unable to do any normal activities.        moderate  7. RECURRENT SYMPTOM: \"Have you ever had this type of stomach pain before?\" If Yes, ask: \"When was the last time?\" and \"What happened that time?\"       no  8. CAUSE: \"What do you think is causing the stomach pain?\"      unknown  9. RELIEVING/AGGRAVATING FACTORS: \"What makes it better or worse?\" (e.g., antacids, bending or twisting motion, bowel movement)      Acetaminophen, heat.   10. OTHER SYMPTOMS: \"Do you have any other symptoms?\" (e.g., back pain, diarrhea, fever, urination pain, vomiting)        nausea    Protocols used: Abdominal Pain - Male-A-[place]    "

## 2024-11-01 NOTE — PROGRESS NOTES
(M54.50) Acute right-sided low back pain without sciatica  (primary encounter diagnosis)  Comment:     Patient presents with probable muscle strain/spasm in right paralumbar region.  Negative UA.  Benign abdominal exam.    Plan: UA with Microscopic reflex to Culture - lab         collect, UA Microscopic with Reflex to Culture,        cyclobenzaprine (FLEXERIL) 10 MG tablet          Muscle relaxer, heat, stretch were advised along with pain medication as he has been doing.      (K50.819) Crohn's disease of small and large intestines with complication (H)  Comment:     Apparently stable at this point on current treatment.    Plan: He will limit ibuprofen.      CHIEF COMPLAINT    Right-sided low back pain.      HISTORY    This 69-year-old man comes in because of onset of right sided back pain 3 days ago.  He feels it likely came on after he was waxing his SUV and being in some awkward positions as he performed the wax job.    He now has tenderness in the right paralumbar region.  It does not radiate into the right leg.    He has no history of kidney stones.    He does have a history of Crohn's disease with some previous surgeries and history of abscess.      REVIEW OF SYSTEMS    No fevers or chills.  No SOB or cough.  No chest pain.  No abdominal pain, no nausea or vomiting.  No rash.    EXAM  /78   Pulse 67   Temp 97.9  F (36.6  C)   Resp 16   Wt 83.9 kg (185 lb)   SpO2 97%   BMI 23.75 kg/m        Patient appears in good physical condition.  HEENT unremarkable.  Nonlabored breathing.  Abdomen nondistended, no localized tenderness.  There is tightness in the right paralumbar region.  SLRs are negative.  Spinal flexibility basically normal.        Results for orders placed or performed in visit on 11/01/24   UA with Microscopic reflex to Culture - lab collect     Status: Abnormal    Specimen: Urine, Clean Catch   Result Value Ref Range    Color Urine Yellow Colorless, Straw, Light Yellow, Yellow     Appearance Urine Clear Clear    Glucose Urine Negative Negative mg/dL    Bilirubin Urine Negative Negative    Ketones Urine Negative Negative mg/dL    Specific Gravity Urine 1.025 1.005 - 1.030    Blood Urine Negative Negative    pH Urine 6.0 5.0 - 8.0    Protein Albumin Urine 100 (A) Negative mg/dL    Urobilinogen Urine 0.2 0.2, 1.0 E.U./dL    Nitrite Urine Negative Negative    Leukocyte Esterase Urine Negative Negative   UA Microscopic with Reflex to Culture     Status: Abnormal   Result Value Ref Range    Bacteria Urine Few (A) None Seen /HPF    RBC Urine 0-2 0-2 /HPF /HPF    WBC Urine 0-5 0-5 /HPF /HPF    Squamous Epithelials Urine None Seen None Seen /LPF    Narrative    Urine Culture not indicated

## 2024-12-18 ENCOUNTER — TRANSFERRED RECORDS (OUTPATIENT)
Dept: HEALTH INFORMATION MANAGEMENT | Facility: CLINIC | Age: 69
End: 2024-12-18
Payer: COMMERCIAL

## 2024-12-26 ENCOUNTER — TRANSFERRED RECORDS (OUTPATIENT)
Dept: HEALTH INFORMATION MANAGEMENT | Facility: CLINIC | Age: 69
End: 2024-12-26
Payer: COMMERCIAL

## 2025-03-11 ENCOUNTER — TRANSFERRED RECORDS (OUTPATIENT)
Dept: HEALTH INFORMATION MANAGEMENT | Facility: CLINIC | Age: 70
End: 2025-03-11
Payer: COMMERCIAL

## 2025-04-04 ENCOUNTER — TRANSFERRED RECORDS (OUTPATIENT)
Dept: ADMINISTRATIVE | Facility: CLINIC | Age: 70
End: 2025-04-04
Payer: COMMERCIAL

## 2025-04-08 NOTE — PROCEDURES
2025        Elvin Cortes) Matthew   [address] [place] [address]-      Elvin (Sachin) Matthew,  :  1955    I am writing to let you know the results of the tests that were done the other day.   Thank you for allowing [facility] the opportunity to take part in your healthcare.  At [facility] we strive to provide each patient with the finest gastroenterology care available.  We hope your experience was pleasant and informative.    Results:  Your radiology test was within acceptable limits.     CT looks good.  Better than previous CTs.  There is no stenosis or obstructive process.  No findings of inflammation.  Prior fluid collection from  is much smaller.  Overall looks good.      Thank you.    Electronically signed by:  Kvng Baum MD 2025 04:00 PM  Document generated by:  Kvng Baum MD  2025  If your provider ordered multiple tests; the results may not become available at the same time.  If multiple test results are received within 14 days of one another, you may receive a duplicate.  cc:  Jorge Mooney MD  cc:  Johann Alvarado MD

## 2025-04-15 ENCOUNTER — PATIENT OUTREACH (OUTPATIENT)
Dept: CARE COORDINATION | Facility: CLINIC | Age: 70
End: 2025-04-15
Payer: COMMERCIAL

## 2025-05-03 ENCOUNTER — HEALTH MAINTENANCE LETTER (OUTPATIENT)
Age: 70
End: 2025-05-03

## 2025-05-05 ENCOUNTER — TRANSFERRED RECORDS (OUTPATIENT)
Dept: ADMINISTRATIVE | Facility: CLINIC | Age: 70
End: 2025-05-05
Payer: COMMERCIAL

## 2025-05-06 NOTE — PROGRESS NOTES
_________________________________________________________________________________________________    Patient name: Elvin Castro  YOB: 1955  Encounter date: 05/05/2025 10:00 AM  Current date: 05/05/2025 11:00 AM  Procedure: Infusion      Infusion/Additional Medication Orders    Assessment: Crohn's disease of both small and large intestine without complications K50.80     Medication grids  Order Date Medication Dose Route Rate Rate 2 Rate 3 Rate 4 Int. of Treat.   04/24/2025 Remicade 10mg/kg  mL/hr    6 Weeks   Inf. Date Medication % Conc. Inf. # Therapy Type Bag # Vials Total mgs Lot # Exp. Date   05/05/2025 Remicade   maintenance  9 850.00 [id number] 09/30/2027   Inf. Date Medication IV Site IV Gauge Start Stop Total Time Wt. (lbs) Wt. (kgs)   05/05/2025 Remicade left antecubital 22 1000AM 1100 AM 0 hour(s) 0 minute(s) 187.00 84.807     Vitals Flowsheet  Time Temp Pulse Resp Sys BP Molly BP Reaction Conc Rate   9:50 AM 96.5 59 17 148 74      10:30 AM 95.6 62 16 121 69      11:00 AM 96.0 63 16 128 65        Post Infusion  The patient did tolerate the infusion.     Nursing Notes  Order date: 04/24/2025  Last infusion date: 03/21/2025  Oral premeds per order: N/A  Specialty Pharmacy: N  Change in health status per assessment? N  IV maintenance 0.9% NS 500ml TKO  Start time: 1000  IV start by: Libby Mayes RN  IV and Fluid D/C time: 1115  NS volume infused: <50mL  Site condition: CDI and patent throughout infusion, no redness/swelling at IV site  D/C instructions reviewed, Pt verbalized understanding.  Libby Mayes RN    Date Medication Total mg Used mg Wasted mg   05/05/2025 Remicade 900.00 850.00 50.00   03/21/2025 Remicade 900.00 852.00 48.00   11/19/2024 Remicade 800.00 800.00 0.00   10/08/2024 Remicade 900.00 857.00 43.00   08/27/2024 Remicade 900.00 827.00 73.00   07/19/2024 Remicade 900.00 830.00 70.00       Visit oversight provided by:  Jimmy Luong MD 05/05/2025 10:00 AM

## 2025-05-19 SDOH — HEALTH STABILITY: PHYSICAL HEALTH: ON AVERAGE, HOW MANY DAYS PER WEEK DO YOU ENGAGE IN MODERATE TO STRENUOUS EXERCISE (LIKE A BRISK WALK)?: 4 DAYS

## 2025-05-19 SDOH — HEALTH STABILITY: PHYSICAL HEALTH: ON AVERAGE, HOW MANY MINUTES DO YOU ENGAGE IN EXERCISE AT THIS LEVEL?: 60 MIN

## 2025-05-19 ASSESSMENT — SOCIAL DETERMINANTS OF HEALTH (SDOH): HOW OFTEN DO YOU GET TOGETHER WITH FRIENDS OR RELATIVES?: MORE THAN THREE TIMES A WEEK

## 2025-05-20 ENCOUNTER — OFFICE VISIT (OUTPATIENT)
Dept: INTERNAL MEDICINE | Facility: CLINIC | Age: 70
End: 2025-05-20
Payer: COMMERCIAL

## 2025-05-20 VITALS
WEIGHT: 182 LBS | DIASTOLIC BLOOD PRESSURE: 70 MMHG | RESPIRATION RATE: 16 BRPM | OXYGEN SATURATION: 99 % | HEART RATE: 70 BPM | BODY MASS INDEX: 23.36 KG/M2 | TEMPERATURE: 98 F | SYSTOLIC BLOOD PRESSURE: 118 MMHG | HEIGHT: 74 IN

## 2025-05-20 DIAGNOSIS — Z00.00 ANNUAL WELLNESS VISIT: Primary | ICD-10-CM

## 2025-05-20 DIAGNOSIS — Z12.5 SCREENING FOR PROSTATE CANCER: ICD-10-CM

## 2025-05-20 DIAGNOSIS — Z51.81 ENCOUNTER FOR THERAPEUTIC DRUG MONITORING: ICD-10-CM

## 2025-05-20 DIAGNOSIS — E53.8 VITAMIN B12 DEFICIENCY (NON ANEMIC): ICD-10-CM

## 2025-05-20 DIAGNOSIS — Z93.3 COLOSTOMY PRESENT (H): ICD-10-CM

## 2025-05-20 DIAGNOSIS — K50.018 CROHN'S DISEASE OF SMALL INTESTINE WITH OTHER COMPLICATION (H): ICD-10-CM

## 2025-05-20 LAB
ALBUMIN SERPL BCG-MCNC: 4 G/DL (ref 3.5–5.2)
ALP SERPL-CCNC: 76 U/L (ref 40–150)
ALT SERPL W P-5'-P-CCNC: 16 U/L (ref 0–70)
ANION GAP SERPL CALCULATED.3IONS-SCNC: 10 MMOL/L (ref 7–15)
AST SERPL W P-5'-P-CCNC: 29 U/L (ref 0–45)
BASOPHILS # BLD AUTO: 0 10E3/UL (ref 0–0.2)
BASOPHILS NFR BLD AUTO: 0 %
BILIRUB SERPL-MCNC: 1.5 MG/DL
BUN SERPL-MCNC: 14.7 MG/DL (ref 8–23)
CALCIUM SERPL-MCNC: 8.6 MG/DL (ref 8.8–10.4)
CHLORIDE SERPL-SCNC: 103 MMOL/L (ref 98–107)
CHOLEST SERPL-MCNC: 126 MG/DL
CREAT SERPL-MCNC: 1.42 MG/DL (ref 0.67–1.17)
EGFRCR SERPLBLD CKD-EPI 2021: 53 ML/MIN/1.73M2
EOSINOPHIL # BLD AUTO: 0.2 10E3/UL (ref 0–0.7)
EOSINOPHIL NFR BLD AUTO: 2 %
ERYTHROCYTE [DISTWIDTH] IN BLOOD BY AUTOMATED COUNT: 12.7 % (ref 10–15)
FASTING STATUS PATIENT QL REPORTED: NO
FASTING STATUS PATIENT QL REPORTED: NO
GLUCOSE SERPL-MCNC: 100 MG/DL (ref 70–99)
HCO3 SERPL-SCNC: 26 MMOL/L (ref 22–29)
HCT VFR BLD AUTO: 38.7 % (ref 40–53)
HDLC SERPL-MCNC: 52 MG/DL
HGB BLD-MCNC: 12.8 G/DL (ref 13.3–17.7)
IMM GRANULOCYTES # BLD: 0 10E3/UL
IMM GRANULOCYTES NFR BLD: 0 %
LDLC SERPL CALC-MCNC: 40 MG/DL
LYMPHOCYTES # BLD AUTO: 4.3 10E3/UL (ref 0.8–5.3)
LYMPHOCYTES NFR BLD AUTO: 43 %
MCH RBC QN AUTO: 30 PG (ref 26.5–33)
MCHC RBC AUTO-ENTMCNC: 33.1 G/DL (ref 31.5–36.5)
MCV RBC AUTO: 91 FL (ref 78–100)
MONOCYTES # BLD AUTO: 0.6 10E3/UL (ref 0–1.3)
MONOCYTES NFR BLD AUTO: 6 %
NEUTROPHILS # BLD AUTO: 4.9 10E3/UL (ref 1.6–8.3)
NEUTROPHILS NFR BLD AUTO: 49 %
NONHDLC SERPL-MCNC: 74 MG/DL
PLATELET # BLD AUTO: 278 10E3/UL (ref 150–450)
POTASSIUM SERPL-SCNC: 3.4 MMOL/L (ref 3.4–5.3)
PROT SERPL-MCNC: 7.5 G/DL (ref 6.4–8.3)
PSA SERPL DL<=0.01 NG/ML-MCNC: 0.41 NG/ML (ref 0–4.5)
RBC # BLD AUTO: 4.26 10E6/UL (ref 4.4–5.9)
SODIUM SERPL-SCNC: 139 MMOL/L (ref 135–145)
TRIGL SERPL-MCNC: 169 MG/DL
VIT B12 SERPL-MCNC: 343 PG/ML (ref 232–1245)
WBC # BLD AUTO: 10 10E3/UL (ref 4–11)

## 2025-05-20 PROCEDURE — G0103 PSA SCREENING: HCPCS | Performed by: INTERNAL MEDICINE

## 2025-05-20 PROCEDURE — 3074F SYST BP LT 130 MM HG: CPT | Performed by: INTERNAL MEDICINE

## 2025-05-20 PROCEDURE — 82607 VITAMIN B-12: CPT | Performed by: INTERNAL MEDICINE

## 2025-05-20 PROCEDURE — G0439 PPPS, SUBSEQ VISIT: HCPCS | Performed by: INTERNAL MEDICINE

## 2025-05-20 PROCEDURE — 80061 LIPID PANEL: CPT | Performed by: INTERNAL MEDICINE

## 2025-05-20 PROCEDURE — 85025 COMPLETE CBC W/AUTO DIFF WBC: CPT | Performed by: INTERNAL MEDICINE

## 2025-05-20 PROCEDURE — 80053 COMPREHEN METABOLIC PANEL: CPT | Performed by: INTERNAL MEDICINE

## 2025-05-20 PROCEDURE — 3078F DIAST BP <80 MM HG: CPT | Performed by: INTERNAL MEDICINE

## 2025-05-20 PROCEDURE — 36415 COLL VENOUS BLD VENIPUNCTURE: CPT | Performed by: INTERNAL MEDICINE

## 2025-05-20 RX ORDER — ESOMEPRAZOLE MAGNESIUM 40 MG/1
40 CAPSULE, DELAYED RELEASE ORAL 2 TIMES DAILY
COMMUNITY
Start: 2024-11-26

## 2025-05-20 NOTE — PATIENT INSTRUCTIONS
No change in medical treatment plan.    Continue to be active on a regular basis, as you have been doing.    Continue to see dermatology and your eye clinic for yearly checks, as you have been doing.    Get a COVID and flu shot this fall.    Continue to follow-up with gastroenterology for your Crohn's treatment.    Take your B12 supplement daily.    See me in 1 year for annual wellness visit.

## 2025-05-20 NOTE — PROGRESS NOTES
Preventive Care Visit  [facility]  Jorge Mooney MD, Internal Medicine  May 20, 2025          Elvin Castro   69 year old male    Date of Visit: 2025    Chief Complaint   Patient presents with    Medicare Visit     Subjective  69-year-old male here for annual wellness visit.  He has Crohn's disease with history of small bowel resections in  in .  Terminal ileum has been resected.  He has a long-term colostomy.    History of esophageal stricture but dilated with EGD May 2024 and swallowing well now.  Not having heartburn issues.  Biopsy was negative for dysplasia negative for H. pylori.  No GI bleeding or melena.    History of perirectal abscess removed in  and no recurrence.    No abdominal pain complaints currently.  Ostomy doing well.    He is active with walking, doing some construction remodeling, thinking of putting in a new tub and his neighbor.  He has been helping out a neighbor who recently , helping the sister clean out the house.  Has been lifting heavy boxes and loading and doing heavy labor and tolerating that activity well.  No pain issues.  No increasing shortness of breath or chest pain.    The skin, no daytime sleepiness.    Low cardiac risk profile with previous cardiac CT scan negative for calcium.  Never smoked.  Normal blood pressure without medication.  He has a low LDL due to the terminal ileum resection.    He is not having worsening chronic hypertension headache symptoms.  Has been better recently with gentle range of motion exercises.  He had a negative brain MRI in .    He sees the eye clinic yearly and just seen last month, negative eye exam.    Sees dermatology yearly and negative eye exam.  Some recent AK's were noted but no skin cancer.    2022 DEXA scan with a spine score of -1.2 and femur score -1.9.  No falls.    No new urinary symptoms and PSA was normal last year.    Last colonoscopy 2022 showed 2 ileal strictures  but no polyp.  He was given a 5-year follow-up plan.    He just recently did an MRI of the pelvis with gastroenterology.  I do not have the report but he stated that it was negative for recurrent fistulas.  He has no open fistulas now and is perirectal area.        PMHx:    Past Medical History:   Diagnosis Date    Bowel obstruction (H)     Crohn's colitis (H)     History of anesthesia complications     Slow to wake    PONV (postoperative nausea and vomiting)     Primary osteoarthritis of both hands 11/6/2018     PSHx:    Past Surgical History:   Procedure Laterality Date    ABDOMEN SURGERY      x3    CARDIAC SURGERY      COLON SURGERY      COLONOSCOPY      EYE SURGERY      INCISION AND DRAINAGE OF WOUND N/A 6/15/2020    Procedure: EXAM UNDER ANESTHESIA WITH DRAINAGE OF ABSCESS;  Surgeon: Kim Ornelas MD;  Location: [facility];  Service: General    ZZC PART REMOVAL COLON W COLOSTOMY      Description: Partial Colectomy With Colostomy;  Recorded: 05/10/2008;     Immunizations:   Immunization History   Administered Date(s) Administered    COVID-19 12+ (Pfizer) 05/15/2024, 11/05/2024    COVID-19 Bivalent 12+ (Pfizer) 09/15/2022    COVID-19 MONOVALENT 12+ (Pfizer) 02/07/2021, 02/28/2021, 08/25/2021    COVID-19 Monovalent 12+ (Pfizer 2022) 02/05/2022    DT (PEDS <7y) 01/01/1990, 07/05/2000    Flu, Unspecified 10/15/2017, 10/22/2022    HepA, Unspecified 06/03/2008, 12/30/2008    HepB, Unspecified 06/03/2008, 06/30/2008, 12/30/2008    Hepatitis A (VAQTA)(ADULT 19+) 06/03/2008, 12/30/2008    Hepatitis B, Adult (Energix-B/Recombivax HB) 06/03/2008, 06/30/2008, 12/30/2008    Influenza (H1N1) 12/12/2009    Influenza (High Dose) Trivalent,PF (Fluzone) 11/05/2024    Influenza (IIV3) PF 10/27/2004, 10/06/2014    Influenza (prior to 2024) 10/04/2011    Influenza Vaccine 18-64 (Flublok) 10/02/2019, 10/02/2020    Influenza Vaccine 65+ (Fluzone HD) 09/27/2021, 10/22/2022, 10/14/2023    Influenza Vaccine >6 months,quad, PF  "10/01/2014, 10/01/2015, 09/26/2016, 10/15/2017    Influenza,INJ,MDCK,PF,Quad >6mo(Flucelvax) 10/15/2017, 10/01/2018    Pneumo Conj 13-V (2010&after) 05/02/2017    Pneumococcal 23 valent 06/03/2008, 07/05/2013, 05/30/2014, 04/30/2019    Pneumococcal, Unspecified 05/30/2014, 04/30/2019    RSV Vaccine (Arexvy) 11/20/2023    TDAP (Adacel,Boostrix) 06/03/2008, 11/14/2018, 08/20/2020    Td,adult,historic,unspecified 06/03/2008    Zoster recombinant adjuvanted (Shingrix) 09/27/2024, 12/05/2024       ROS A comprehensive review of systems was performed and was otherwise negative    Medications, allergies, and problem list were reviewed and updated    Exam  /70   Pulse 70   Temp 98  F (36.7  C)   Resp 16   Ht 1.87 m (6' 1.62\")   Wt 82.6 kg (182 lb)   SpO2 99%   BMI 23.61 kg/m    Alert and oriented x 3.  Normal mood and affect.  Normal clock face drawing and word recall.  Mobility is normal.  Pupils and irises equal and reactive.  Extraocular muscles intact.  No jaundice or conjunctivitis.  Tympanic membranes are normal with minimal cerumen.  Pharynx is normal.  No oral lesions.  Teeth in good condition.  No cervical or supraclavicular or axillary adenopathy.  No JVD and no carotid bruits.  No thyromegaly or nodularity to inspection and palpation.  Lungs are clear to auscultation with good respiratory excursion.  Heart is regular with no murmur rub or gallop.  +1 ankle pulses and no ankle edema.  He denies any foot issues.  Abdomen is thin, nontender.  Ostomy in the left lower quadrant appears healthy.  Just some mild chapped skin around the ostomy without skin breakdown.  Skin exam without suspicious skin lesions.    Assessment/Plan  1. Annual wellness visit (Primary)  Main focus for patient is his Crohn's disease, currently well-controlled and in remission on Remicade.  Continue management with gastroenterology.  His next colonoscopy is due April 2027.    I emphasized the importance of regular activity and " social interaction which she is doing well at.    Counseling given, as appropriate, for nutrition, fall prevention, tobacco, physical activity, social engagement, weight loss and cognition.    Full code.    Up-to-date with immunizations.  I did discuss option for COVID-vaccine at 6 months, but he declined and will plan to wait and get COVID-vaccine this fall with flu shot.    Low cardiovascular risk profile    2. Crohn's disease of small intestine with other complication (H)  In remission.  Remicade.  Managed by gastroenterology.    Low LDL secondary to ileal resection  - Lipid panel reflex to direct LDL Non-fasting    Continue yearly dermatology check in September and yearly eye exam in April for routine surveillance.    No recurrent perirectal abscess and recent abdominal MRI done at  was negative according to patient.    He is managing his ostomy well    3. Vitamin B12 deficiency (non anemic)  Not always taking his vitamin B12 regularly.  Recheck level.  He was reminded to take the vitamin B supplement regularly.  History of terminal ileum resection.      - Vitamin B12    4. Screening for prostate cancer  Yearly screening, no symptoms  - Prostate Specific Antigen Screen    5. Encounter for therapeutic drug monitoring    - CBC with Platelets & Differential  - Comprehensive metabolic panel      Return in about 1 year (around 5/20/2026) for Annual wellness visit.   Patient Instructions   No change in medical treatment plan.    Continue to be active on a regular basis, as you have been doing.    Continue to see dermatology and your eye clinic for yearly checks, as you have been doing.    Get a COVID and flu shot this fall.    Continue to follow-up with gastroenterology for your Crohn's treatment.    Take your B12 supplement daily.    See me in 1 year for annual wellness visit.    Jorge Mooney MD, MD        Current Outpatient Medications   Medication Sig Dispense Refill    ascorbic acid, vitamin C, (VITAMIN C)  1000 MG tablet [ASCORBIC ACID, VITAMIN C, (VITAMIN C) 1000 MG TABLET] Take 1,000 mg by mouth daily.      calcium citrate (CALCITRATE) 200 mg (950 mg) tablet Take 2 tablets by mouth 2 times daily      cholecalciferol, vitamin D3, 1,000 unit tablet Take 2,000 Units by mouth daily      cyanocobalamin, vitamin B-12, 2,500 mcg Tab Take 1,250 mcg by mouth daily      cyclobenzaprine (FLEXERIL) 10 MG tablet Take 1 tablet (10 mg) by mouth 3 times daily as needed for muscle spasms. 20 tablet 0    inFLIXimab (REMICADE) 100 MG injection Administer Remicade 10mg/kg every six weeks, infuse by IV route      multivitamin w/minerals (THERA-VIT-M) tablet Take 1 tablet by mouth daily      NEXIUM 40 MG DR capsule Take 40 mg by mouth 2 times daily.      ondansetron (ZOFRAN ODT) 4 MG ODT tab Take 4 mg by mouth every 6 hours as needed for nausea      esomeprazole (NEXIUM) 20 MG capsule [ESOMEPRAZOLE (NEXIUM) 20 MG CAPSULE] Take 2 capsules by mouth. (Patient not taking: Reported on 5/20/2025)       Allergies   Allergen Reactions    Mercaptopurine Muscle Pain (Myalgia)     Extreme muscle weakness, required admission to hospital.      Social History     Tobacco Use    Smoking status: Never     Passive exposure: Never    Smokeless tobacco: Never   Vaping Use    Vaping status: Never Used   Substance Use Topics    Alcohol use: Yes     Alcohol/week: 1.0 standard drink of alcohol     Comment: Alcoholic Drinks/day: once a month    Drug use: No             Subjective   Sachin is a 69 year old, presenting for the following:  Medicare Visit        5/20/2025     2:26 PM   Additional Questions   Roomed by Terri BARBA   Accompanied by thad DOZIER           Advance Care Planning    Patient states has Health Care Directive and will send to [facility].        5/19/2025   General Health   How would you rate your overall physical health? Good   Feel stress (tense, anxious, or unable to sleep) To some extent   (!) STRESS CONCERN      5/19/2025    Nutrition   Diet: Regular (no restrictions)         5/19/2025   Exercise   Days per week of moderate/strenous exercise 4 days   Average minutes spent exercising at this level 60 min         5/19/2025   Social Factors   Frequency of gathering with friends or relatives More than three times a week   Worry food won't last until get money to buy more No   Food not last or not have enough money for food? No   Do you have housing? (Housing is defined as stable permanent housing and does not include staying outside in a car, in a tent, in an abandoned building, in an overnight shelter, or couch-surfing.) Yes   Are you worried about losing your housing? No   Lack of transportation? No   Unable to get utilities (heat,electricity)? No         5/19/2025   Fall Risk   Fallen 2 or more times in the past year? No   Trouble with walking or balance? No          5/19/2025   Activities of Daily Living- Home Safety   Needs help with the following daily activites None of the above   Safety concerns in the home None of the above         5/19/2025   Dental   Dentist two times every year? Yes         5/19/2025   Hearing Screening   Hearing concerns? (!) I NEED TO ASK PEOPLE TO SPEAK UP OR REPEAT THEMSELVES.    (!) IT'S HARDER TO UNDERSTAND WOMEN'S VOICES THAN MEN'S VOICES.       Multiple values from one day are sorted in reverse-chronological order         5/19/2025   Driving Risk Screening   Patient/family members have concerns about driving No         5/19/2025   General Alertness/Fatigue Screening   Have you been more tired than usual lately? No         5/19/2025   Urinary Incontinence Screening   Bothered by leaking urine in past 6 months No         Today's PHQ-2 Score:       5/19/2025     3:00 PM   PHQ-2 ( 1999 Pfizer)   Q1: Little interest or pleasure in doing things 0   Q2: Feeling down, depressed or hopeless 0   PHQ-2 Score 0    Q1: Little interest or pleasure in doing things Not at all   Q2: Feeling down, depressed or hopeless  Not at all   PHQ-2 Score 0       Patient-reported           5/19/2025   Substance Use   Alcohol more than 3/day or more than 7/wk No   Do you have a current opioid prescription? No   How severe/bad is pain from 1 to 10? 5/10   Do you use any other substances recreationally? No     Social History     Tobacco Use    Smoking status: Never     Passive exposure: Never    Smokeless tobacco: Never   Vaping Use    Vaping status: Never Used   Substance Use Topics    Alcohol use: Yes     Alcohol/week: 1.0 standard drink of alcohol     Comment: Alcoholic Drinks/day: once a month    Drug use: No           5/19/2025   AAA Screening   Family history of Abdominal Aortic Aneurysm (AAA)? No   Last PSA:   Prostate Specific Antigen Screen   Date Value Ref Range Status   05/15/2024 0.44 0.00 - 4.50 ng/mL Final   02/16/2022 0.56 0.00 - 4.50 ug/L Final     ASCVD Risk   The ASCVD Risk score (Melissa HARRISON, et al., 2019) failed to calculate for the following reasons:    Cannot find a previous HDL lab    Cannot find a previous total cholesterol lab            Reviewed and updated as needed this visit by Provider                      Current providers sharing in care for this patient include:  Patient Care Team:  Jorge Mooney MD as PCP - General (Internal Medicine)  Jorge Mooney MD as Assigned PCP    The following health maintenance items are reviewed in Epic and correct as of today:  Health Maintenance   Topic Date Due    LIPID  Never done    ANNUAL REVIEW OF HM ORDERS  02/24/2023    Pneumococcal Vaccine: 50+ Years (4 of 4 - PCV20 or PCV21) 04/30/2024    COVID-19 Vaccine (8 - Pfizer risk 2024-25 season) 05/05/2025    MEDICARE ANNUAL WELLNESS VISIT  05/15/2025    COLORECTAL CANCER SCREENING  11/08/2025    FALL RISK ASSESSMENT  05/20/2026    DIABETES SCREENING  07/12/2027    ADVANCE CARE PLANNING  05/15/2029    DTAP/TDAP/TD IMMUNIZATION (4 - Td or Tdap) 08/20/2030    HEPATITIS C SCREENING  Completed    PHQ-2 (once per calendar  "year)  Completed    INFLUENZA VACCINE  Completed    ZOSTER IMMUNIZATION  Completed    RSV VACCINE  Completed    HPV IMMUNIZATION  Aged Out    MENINGITIS IMMUNIZATION  Aged Out            Objective    Exam  /70   Pulse 70   Temp 98  F (36.7  C)   Resp 16   Ht 1.87 m (6' 1.62\")   Wt 82.6 kg (182 lb)   SpO2 99%   BMI 23.61 kg/m     Estimated body mass index is 23.61 kg/m  as calculated from the following:    Height as of this encounter: 1.87 m (6' 1.62\").    Weight as of this encounter: 82.6 kg (182 lb).    Physical Exam           5/20/2025   Mini Cog   Clock Draw Score 2 Normal   3 Item Recall 3 objects recalled   Mini Cog Total Score 5              Signed Electronically by: Jorge Mooney MD    "

## 2025-05-21 ENCOUNTER — RESULTS FOLLOW-UP (OUTPATIENT)
Dept: INTERNAL MEDICINE | Facility: CLINIC | Age: 70
End: 2025-05-21

## 2025-06-16 ENCOUNTER — TRANSFERRED RECORDS (OUTPATIENT)
Dept: ADMINISTRATIVE | Facility: CLINIC | Age: 70
End: 2025-06-16
Payer: COMMERCIAL

## 2025-06-17 NOTE — PROGRESS NOTES
_________________________________________________________________________________________________    Patient name: Elvin Castro  YOB: 1955  Encounter date: 06/16/2025 07:45 AM  Current date: 06/16/2025 08:47 AM  Procedure: Infusion      Infusion/Additional Medication Orders    Assessment: Crohn's disease of both small and large intestine without complication K50.80     Medication grids  Order Date Medication Dose Route Rate Rate 2 Rate 3 Rate 4 Int. of Treat.   04/24/2025 Remicade 10mg/kg  mL/hr    6 Weeks   Inf. Date Medication % Conc. Inf. # Therapy Type Bag # Vials Total mgs Lot # Exp. Date   06/16/2025 Remicade   maintenance  9 841.00 [id number] 01/31/2028   Inf. Date Medication IV Site IV Gauge Start Stop Total Time Wt. (lbs) Wt. (kgs)   06/16/2025 Remicade left antecubital 22 7:42 AM 8:42 AM 0 hour(s) 0 minute(s) 185.00 83.900     Vitals Flowsheet  Time Temp Pulse Resp Sys BP Molly BP Reaction Conc Rate   7:37 AM 95.7 37 14 122 65      8:16 AM 96.8 67 13 112 64      8:43 AM 97.0 66 13 117 69        Post Infusion  The patient did tolerate the infusion.     Nursing Notes  Order date: 4/24/2025  Last infusion date: 5/5/25  Oral premeds per order: N/A  Specialty Pharmacy: N  Change in health status per assessment? N  IV maintenance 0.9% NS 500ml TKO  Start time: 7:32 am    IV start by: Lucy Busby RN  IV and Fluid D/C time: 8:57 am   NS volume infused: <50mL  Site condition: CDI and patent throughout infusion, no redness/swelling at IV site  D/C instructions reviewed, Pt verbalized understanding.  Lucy Busby RN    Date Medication Total mg Used mg Wasted mg   06/16/2025 Remicade 900.00 841.00 59.00   05/05/2025 Remicade 900.00 850.00 50.00   03/21/2025 Remicade 900.00 852.00 48.00   11/19/2024 Remicade 800.00 800.00 0.00   10/08/2024 Remicade 900.00 857.00 43.00   08/27/2024 Remicade 900.00 827.00 73.00       Visit oversight provided by:  Torsten Osman MD 06/16/2025 07:45 AM

## 2025-07-28 ENCOUNTER — TRANSFERRED RECORDS (OUTPATIENT)
Dept: ADMINISTRATIVE | Facility: CLINIC | Age: 70
End: 2025-07-28
Payer: COMMERCIAL

## 2025-07-29 NOTE — PROGRESS NOTES
_________________________________________________________________________________________________    Patient name: Elvin Castro  YOB: 1955  Encounter date: 07/28/2025 07:45 AM  Current date: 07/28/2025 08:43 AM  Procedure: Infusion      Infusion/Additional Medication Orders    Assessment: Crohn's disease of both small and large intestine without complications K50.80     Medication grids  Order Date Medication Dose Route Rate Rate 2 Rate 3 Rate 4 Int. of Treat.   04/24/2025 Remicade 10mg/kg  mL/hr    6 Weeks   Inf. Date Medication % Conc. Inf. # Therapy Type Bag # Vials Total mgs Lot # Exp. Date   07/28/2025 Remicade   maintenance  9 850.00 [id number] 02/29/2028   Inf. Date Medication IV Site IV Gauge Start Stop Total Time Wt. (lbs) Wt. (kgs)   07/28/2025 Remicade left antecubital 22 7:30 AM 8:30 AM 0 hour(s) 0 minute(s) 187.50 85.034     Vitals Flowsheet  Time Temp Pulse Resp Sys BP Molly BP Reaction Conc Rate   7:26 AM 96.8 68 16 132 74      8:05 AM 96.5 63 16 101 61      8:35 AM 96.6 62 16 116 67        Post Infusion  The patient did tolerate the infusion.     Nursing Notes  Order date: 4/24/25  Last infusion date: 6/16/25  Oral premeds per order: N/A  Specialty Pharmacy: N  Change in health status per assessment? N  IV maintenance 0.9% NS 500ml TKO  Start time: 0724  IV start by: Lynn Ojeda RN  IV and Fluid D/C time: 0845  NS volume infused: <50mL  Site condition: CDI and patent throughout infusion, no redness/swelling at IV site  D/C instructions reviewed, Pt verbalized understanding.  Lynn Ojeda RN    Date Medication Total mg Used mg Wasted mg   07/28/2025 Remicade 900.00 850.00 50.00   06/16/2025 Remicade 900.00 841.00 59.00   05/05/2025 Remicade 900.00 850.00 50.00   03/21/2025 Remicade 900.00 852.00 48.00   11/19/2024 Remicade 800.00 800.00 0.00   10/08/2024 Remicade 900.00 857.00 43.00       Visit oversight provided by:  Shaun Mercedes MD 07/28/2025 07:45 AM